# Patient Record
Sex: FEMALE | Race: WHITE | NOT HISPANIC OR LATINO | Employment: OTHER | URBAN - METROPOLITAN AREA
[De-identification: names, ages, dates, MRNs, and addresses within clinical notes are randomized per-mention and may not be internally consistent; named-entity substitution may affect disease eponyms.]

---

## 2019-04-01 ENCOUNTER — OFFICE VISIT (OUTPATIENT)
Dept: URGENT CARE | Facility: CLINIC | Age: 44
End: 2019-04-01
Payer: COMMERCIAL

## 2019-04-01 VITALS
DIASTOLIC BLOOD PRESSURE: 76 MMHG | SYSTOLIC BLOOD PRESSURE: 128 MMHG | OXYGEN SATURATION: 98 % | BODY MASS INDEX: 45 KG/M2 | TEMPERATURE: 99.1 F | HEART RATE: 125 BPM | WEIGHT: 280 LBS | RESPIRATION RATE: 13 BRPM | HEIGHT: 66 IN

## 2019-04-01 DIAGNOSIS — J02.9 PHARYNGITIS, UNSPECIFIED ETIOLOGY: ICD-10-CM

## 2019-04-01 DIAGNOSIS — F17.200 SMOKER: ICD-10-CM

## 2019-04-01 DIAGNOSIS — R05.9 COUGH: ICD-10-CM

## 2019-04-01 DIAGNOSIS — J10.1 INFLUENZA A: ICD-10-CM

## 2019-04-01 LAB
FLUAV+FLUBV AG SPEC QL IA: ABNORMAL
INT CON NEG: NEGATIVE
INT CON NEG: NEGATIVE
INT CON POS: POSITIVE
INT CON POS: POSITIVE
S PYO AG THROAT QL: NORMAL

## 2019-04-01 PROCEDURE — 87804 INFLUENZA ASSAY W/OPTIC: CPT | Performed by: PHYSICIAN ASSISTANT

## 2019-04-01 PROCEDURE — 87880 STREP A ASSAY W/OPTIC: CPT | Performed by: PHYSICIAN ASSISTANT

## 2019-04-01 PROCEDURE — 99406 BEHAV CHNG SMOKING 3-10 MIN: CPT | Performed by: PHYSICIAN ASSISTANT

## 2019-04-01 PROCEDURE — 99214 OFFICE O/P EST MOD 30 MIN: CPT | Mod: 25 | Performed by: PHYSICIAN ASSISTANT

## 2019-04-01 RX ORDER — OSELTAMIVIR PHOSPHATE 75 MG/1
75 CAPSULE ORAL 2 TIMES DAILY
Qty: 10 CAP | Refills: 0 | Status: SHIPPED | OUTPATIENT
Start: 2019-04-01 | End: 2019-04-06

## 2019-04-01 RX ORDER — BENZONATATE 100 MG/1
200 CAPSULE ORAL 3 TIMES DAILY PRN
Qty: 60 CAP | Refills: 0 | Status: SHIPPED | OUTPATIENT
Start: 2019-04-01 | End: 2019-05-21

## 2019-04-01 ASSESSMENT — ENCOUNTER SYMPTOMS
NAUSEA: 0
HEMOPTYSIS: 0
SPUTUM PRODUCTION: 1
VOMITING: 0
COUGH: 1
ABDOMINAL PAIN: 0
FEVER: 1
SHORTNESS OF BREATH: 0
SORE THROAT: 1
WHEEZING: 0
MYALGIAS: 1
CHILLS: 1
HEADACHES: 1
DIARRHEA: 1

## 2019-04-01 NOTE — LETTER
April 1, 2019         Patient: Marni Muñoz   YOB: 1975   Date of Visit: 4/1/2019           To Whom it May Concern:    Marni Muñoz was seen in my clinic on 4/1/2019. She may return to work on 4/4/19..    If you have any questions or concerns, please don't hesitate to call.        Sincerely,           [facility]  Electronically Signed

## 2019-04-02 NOTE — PROGRESS NOTES
"Subjective:   Marni Muñoz is a 43 y.o. female who presents for Cough and Sore Throat    This is a new problem.  Patient presents to urgent care with 2-day history of nasal congestion, sore throat and cough.  She reports fatigue with generalized body aches, chills and fever.  Patient did not receive influenza vaccine this season.  She has been taking Tylenol Cold and flu max with no improvement in symptoms.  Cough is productive at times of yellow colored sputum.        Cough   Associated symptoms include chills, a fever, headaches, myalgias and a sore throat. Pertinent negatives include no chest pain, ear pain, hemoptysis, shortness of breath or wheezing.     Review of Systems   Constitutional: Positive for chills, fever and malaise/fatigue.   HENT: Positive for congestion and sore throat. Negative for ear pain.    Respiratory: Positive for cough and sputum production. Negative for hemoptysis, shortness of breath and wheezing.    Cardiovascular: Negative for chest pain.   Gastrointestinal: Positive for diarrhea. Negative for abdominal pain, nausea and vomiting.   Musculoskeletal: Positive for myalgias.   Neurological: Positive for headaches.   All other systems reviewed and are negative.    Allergies   Allergen Reactions   • Amoxicillin Rash   • Food      bananas        Objective:   /76   Pulse (!) 125   Temp 37.3 °C (99.1 °F) (Temporal)   Resp 13   Ht 1.676 m (5' 6\")   Wt (!) 127 kg (280 lb)   SpO2 98%   BMI 45.19 kg/m²   Physical Exam   Constitutional: She is oriented to person, place, and time. She appears well-developed and well-nourished. She does not appear ill. No distress.   HENT:   Head: Normocephalic and atraumatic.   Right Ear: Tympanic membrane, external ear and ear canal normal.   Left Ear: Tympanic membrane, external ear and ear canal normal.   Nose: Mucosal edema present. No rhinorrhea. Right sinus exhibits no maxillary sinus tenderness and no frontal sinus tenderness. Left sinus " exhibits no maxillary sinus tenderness and no frontal sinus tenderness.   Mouth/Throat: Uvula is midline and mucous membranes are normal. Posterior oropharyngeal erythema present. No oropharyngeal exudate. Tonsils are 1+ on the right. Tonsils are 1+ on the left. No tonsillar exudate.   Eyes: Pupils are equal, round, and reactive to light. Conjunctivae and EOM are normal.   Neck: Normal range of motion. Neck supple.   Cardiovascular: Normal rate, regular rhythm and normal heart sounds.  Exam reveals no gallop and no friction rub.    No murmur heard.  Pulmonary/Chest: Effort normal and breath sounds normal. No respiratory distress. She has no wheezes. She has no rales.   Abdominal: Soft. Bowel sounds are normal. She exhibits no distension and no mass. There is no tenderness. There is no rebound and no guarding.   Musculoskeletal: Normal range of motion. She exhibits no edema, tenderness or deformity.   Lymphadenopathy:        Head (right side): No submental, no submandibular and no tonsillar adenopathy present.        Head (left side): No submental, no submandibular and no tonsillar adenopathy present.     She has no cervical adenopathy.        Right: No supraclavicular adenopathy present.        Left: No supraclavicular adenopathy present.   Neurological: She is alert and oriented to person, place, and time. She has normal strength. No cranial nerve deficit or sensory deficit. Coordination normal.   Skin: Skin is warm and dry. No rash noted.   Psychiatric: She has a normal mood and affect. Judgment normal.   Vitals reviewed.          Assessment/Plan:   Assessment    1. Influenza A  - POCT Rapid Strep A  - POCT Influenza A/B  - oseltamivir (TAMIFLU) 75 MG Cap; Take 1 Cap by mouth 2 times a day for 5 days.  Dispense: 10 Cap; Refill: 0    2. Smoker  Greater than 3 minutes spent counseling on nicotine dependence, associated disease process and affect on present illness. Counseled regarding cessation. Not ready to quit at  this time.     3. Cough  - benzonatate (TESSALON) 100 MG Cap; Take 2 Caps by mouth 3 times a day as needed.  Dispense: 60 Cap; Refill: 0    4. Pharyngitis, unspecified etiology  - maalox plus-benadryl-visc lidocaine (MAGIC MOUTHWASH); 10 ml swish, gargle and spit every 6 hours as needed for sore throat  Dispense: 120 mL; Refill: 0    Testing for influenza is positive for influenza A.  Strep testing is negative.  Symptomatic, supportive care.  Increase fluids, rest.  Patient is given a prescription for Tamiflu as above and encouraged to take this starting this evening and take it with food in order to avoid GI upset.  She is also given Tessalon Perles for cough and Magic mouthwash for sore throat.  Ice pops, cool fluids, salt water gargles.  Contagion reviewed.  Printed information with patient education on influenza given.  Note off work given.      Differential diagnosis, natural history, supportive care, and indications for immediate follow-up discussed.    If not improving in 3-5 days, F/U with PCP or return to  or sooner if worsens  Strict ER precautions given.    Please note that this note was created using voice recognition speech to text software. Every effort has been made to correct obvious errors.  However, I expect there are errors of grammar and possibly context that were not discovered prior to finalizing the note

## 2019-04-02 NOTE — PATIENT INSTRUCTIONS
"Influenza, Adult  Influenza (“the flu\") is an infection in the lungs, nose, and throat (respiratory tract). It is caused by a virus. The flu causes many common cold symptoms, as well as a high fever and body aches. It can make you feel very sick.  The flu spreads easily from person to person (is contagious). Getting a flu shot (influenza vaccination) every year is the best way to prevent the flu.  Follow these instructions at home:  · Take over-the-counter and prescription medicines only as told by your doctor.  · Use a cool mist humidifier to add moisture (humidity) to the air in your home. This can make it easier to breathe.  · Rest as needed.  · Drink enough fluid to keep your pee (urine) clear or pale yellow.  · Cover your mouth and nose when you cough or sneeze.  · Wash your hands with soap and water often, especially after you cough or sneeze. If you cannot use soap and water, use hand .  · Stay home from work or school as told by your doctor. Unless you are visiting your doctor, try to avoid leaving home until your fever has been gone for 24 hours without the use of medicine.  · Keep all follow-up visits as told by your doctor. This is important.  How is this prevented?  · Getting a yearly (annual) flu shot is the best way to avoid getting the flu. You may get the flu shot in late summer, fall, or winter. Ask your doctor when you should get your flu shot.  · Wash your hands often or use hand  often.  · Avoid contact with people who are sick during cold and flu season.  · Eat healthy foods.  · Drink plenty of fluids.  · Get enough sleep.  · Exercise regularly.  Contact a doctor if:  · You get new symptoms.  · You have:  ¨ Chest pain.  ¨ Watery poop (diarrhea).  ¨ A fever.  · Your cough gets worse.  · You start to have more mucus.  · You feel sick to your stomach (nauseous).  · You throw up (vomit).  Get help right away if:  · You start to be short of breath or have trouble breathing.  · Your " skin or nails turn a bluish color.  · You have very bad pain or stiffness in your neck.  · You get a sudden headache.  · You get sudden pain in your face or ear.  · You cannot stop throwing up.  This information is not intended to replace advice given to you by your health care provider. Make sure you discuss any questions you have with your health care provider.  Document Released: 09/26/2009 Document Revised: 05/25/2017 Document Reviewed: 10/11/2016  [employer] Patient Education © 2017 [employer]  C.

## 2019-05-21 ENCOUNTER — OFFICE VISIT (OUTPATIENT)
Dept: URGENT CARE | Facility: CLINIC | Age: 44
End: 2019-05-21
Payer: COMMERCIAL

## 2019-05-21 ENCOUNTER — APPOINTMENT (OUTPATIENT)
Dept: RADIOLOGY | Facility: IMAGING CENTER | Age: 44
End: 2019-05-21
Attending: PHYSICIAN ASSISTANT
Payer: COMMERCIAL

## 2019-05-21 VITALS
HEIGHT: 66 IN | DIASTOLIC BLOOD PRESSURE: 80 MMHG | BODY MASS INDEX: 37.61 KG/M2 | SYSTOLIC BLOOD PRESSURE: 132 MMHG | TEMPERATURE: 98.6 F | HEART RATE: 97 BPM | WEIGHT: 234 LBS | OXYGEN SATURATION: 98 % | RESPIRATION RATE: 14 BRPM

## 2019-05-21 DIAGNOSIS — S99.922A INJURY OF LEFT FOOT, INITIAL ENCOUNTER: Primary | ICD-10-CM

## 2019-05-21 DIAGNOSIS — S99.922A INJURY OF LEFT FOOT, INITIAL ENCOUNTER: ICD-10-CM

## 2019-05-21 PROCEDURE — 73630 X-RAY EXAM OF FOOT: CPT | Mod: TC,LT | Performed by: PHYSICIAN ASSISTANT

## 2019-05-21 PROCEDURE — 99214 OFFICE O/P EST MOD 30 MIN: CPT | Performed by: PHYSICIAN ASSISTANT

## 2019-05-21 ASSESSMENT — ENCOUNTER SYMPTOMS
FEVER: 0
FATIGUE: 0
FALLS: 0
JOINT SWELLING: 1
VOMITING: 0
CHANGE IN BOWEL HABIT: 0
CHILLS: 0
TINGLING: 0
WEAKNESS: 0
ABDOMINAL PAIN: 0
DIARRHEA: 0
COUGH: 0
NAUSEA: 0
CONSTIPATION: 0
SHORTNESS OF BREATH: 0
NUMBNESS: 0

## 2019-05-22 NOTE — PROGRESS NOTES
"Subjective:   Marni Muñoz is a 43 y.o. female who presents for Foot Pain (Left foot )        Foot Problem   This is a new problem. The current episode started yesterday. The problem occurs constantly. The problem has been unchanged. Associated symptoms include joint swelling. Pertinent negatives include no abdominal pain, change in bowel habit, chills, congestion, coughing, fatigue, fever, nausea, numbness, rash, urinary symptoms, vomiting or weakness.     Review of Systems   Constitutional: Negative for chills, fatigue, fever and malaise/fatigue.   HENT: Negative for congestion.    Respiratory: Negative for cough and shortness of breath.    Gastrointestinal: Negative for abdominal pain, change in bowel habit, constipation, diarrhea, nausea and vomiting.   Musculoskeletal: Positive for joint pain and joint swelling. Negative for falls.   Skin: Negative for rash.   Neurological: Negative for tingling, weakness and numbness.   All other systems reviewed and are negative.      PMH:  has no past medical history on file.  MEDS:   Current Outpatient Prescriptions:   •  ibuprofen (MOTRIN) 600 MG TABS, Take 600 mg by mouth every 6 hours as needed., Disp: , Rfl:   •  ibuprofen (MOTRIN) 200 MG TABS, Take 200 mg by mouth every 6 hours as needed.  , Disp: , Rfl:   •  ibuprofen (MOTRIN) 800 MG TABS, Take 800 mg by mouth every 6 hours as needed., Disp: , Rfl:   ALLERGIES:   Allergies   Allergen Reactions   • Amoxicillin Rash   • Food      bananas     SURGHX: History reviewed. No pertinent surgical history.  SOCHX:  reports that she has been smoking Cigarettes.  She has smoked for the past 22.00 years. She has never used smokeless tobacco. She reports that she drinks alcohol. She reports that she does not use drugs.  History reviewed. No pertinent family history.     Objective:   /80   Pulse 97   Temp 37 °C (98.6 °F) (Temporal)   Resp 14   Ht 1.676 m (5' 6\")   Wt 106.1 kg (234 lb)   SpO2 98%   BMI 37.77 kg/m²     "     Physical Exam   Constitutional: She is oriented to person, place, and time. She appears well-developed and well-nourished. No distress.   HENT:   Head: Normocephalic and atraumatic.   Nose: Nose normal.   Eyes: Pupils are equal, round, and reactive to light. Conjunctivae are normal.   Neck: Normal range of motion. Neck supple. No tracheal deviation present.   Cardiovascular: Normal rate and regular rhythm.    Pulmonary/Chest: Effort normal and breath sounds normal.   Musculoskeletal:        Left foot: There is decreased range of motion, tenderness and bony tenderness.        Feet:    Neurological: She is alert and oriented to person, place, and time.   Skin: Skin is warm and dry. Capillary refill takes less than 2 seconds.   Psychiatric: She has a normal mood and affect. Her behavior is normal.   Vitals reviewed.    XR:   FINDINGS:  No acute displaced fracture is noted.    There is no subluxation.    There is no unusual degeneration.    No focal soft tissue swelling   Impression       No radiographic evidence of acute displaced fracture or subluxation.             Assessment/Plan:     1. Injury of left foot, initial encounter  DX-FOOT-COMPLETE 3+ LEFT     Per my read, no fracture seen on x-ray.  Agree with radiology report.Pt directed RICE and Ibuprofen 600-800mg as needed for pain. Recommended hard soled shoes. Educational handout on RICE injuries provided.      Follow-up with primary care provider.  If symptoms worsen or persist patient can return to clinic for reevaluation.  Patient verbalized understanding of information.    Please note that this dictation was created using voice recognition software. I have made every reasonable attempt to correct obvious errors, but I expect that there are errors of grammar and possibly content that I did not discover before finalizing the note.

## 2020-06-30 ENCOUNTER — OFFICE VISIT (OUTPATIENT)
Dept: URGENT CARE | Facility: CLINIC | Age: 45
End: 2020-06-30
Payer: COMMERCIAL

## 2020-06-30 ENCOUNTER — HOSPITAL ENCOUNTER (OUTPATIENT)
Facility: MEDICAL CENTER | Age: 45
End: 2020-06-30
Attending: PHYSICIAN ASSISTANT
Payer: COMMERCIAL

## 2020-06-30 VITALS
OXYGEN SATURATION: 96 % | SYSTOLIC BLOOD PRESSURE: 100 MMHG | HEIGHT: 66 IN | WEIGHT: 229 LBS | BODY MASS INDEX: 36.8 KG/M2 | RESPIRATION RATE: 16 BRPM | DIASTOLIC BLOOD PRESSURE: 60 MMHG | TEMPERATURE: 97.7 F | HEART RATE: 129 BPM

## 2020-06-30 DIAGNOSIS — R05.9 COUGH: ICD-10-CM

## 2020-06-30 DIAGNOSIS — R53.83 FATIGUE, UNSPECIFIED TYPE: ICD-10-CM

## 2020-06-30 DIAGNOSIS — J02.9 PHARYNGITIS, UNSPECIFIED ETIOLOGY: ICD-10-CM

## 2020-06-30 LAB
FLUAV+FLUBV AG SPEC QL IA: NEGATIVE
INT CON NEG: NEGATIVE
INT CON NEG: NEGATIVE
INT CON POS: POSITIVE
INT CON POS: POSITIVE
S PYO AG THROAT QL: NEGATIVE

## 2020-06-30 PROCEDURE — 87804 INFLUENZA ASSAY W/OPTIC: CPT | Performed by: PHYSICIAN ASSISTANT

## 2020-06-30 PROCEDURE — 87880 STREP A ASSAY W/OPTIC: CPT | Performed by: PHYSICIAN ASSISTANT

## 2020-06-30 PROCEDURE — U0003 INFECTIOUS AGENT DETECTION BY NUCLEIC ACID (DNA OR RNA); SEVERE ACUTE RESPIRATORY SYNDROME CORONAVIRUS 2 (SARS-COV-2) (CORONAVIRUS DISEASE [COVID-19]), AMPLIFIED PROBE TECHNIQUE, MAKING USE OF HIGH THROUGHPUT TECHNOLOGIES AS DESCRIBED BY CMS-2020-01-R: HCPCS

## 2020-06-30 PROCEDURE — 99214 OFFICE O/P EST MOD 30 MIN: CPT | Performed by: PHYSICIAN ASSISTANT

## 2020-06-30 RX ORDER — AZITHROMYCIN 250 MG/1
TABLET, FILM COATED ORAL
Qty: 6 TAB | Refills: 0 | Status: ON HOLD | OUTPATIENT
Start: 2020-06-30 | End: 2021-02-04

## 2020-06-30 ASSESSMENT — ENCOUNTER SYMPTOMS
SINUS PAIN: 0
FATIGUE: 1
COUGH: 1
WHEEZING: 0
NAUSEA: 0
DIARRHEA: 0
SHORTNESS OF BREATH: 0
FEVER: 0
ABDOMINAL PAIN: 0
VOMITING: 0
SORE THROAT: 1
MYALGIAS: 1
CHILLS: 1
SPUTUM PRODUCTION: 0

## 2020-07-01 DIAGNOSIS — R53.83 FATIGUE, UNSPECIFIED TYPE: ICD-10-CM

## 2020-07-01 DIAGNOSIS — R05.9 COUGH: ICD-10-CM

## 2020-07-01 LAB
COVID ORDER STATUS COVID19: NORMAL
SARS-COV-2 RNA RESP QL NAA+PROBE: NOTDETECTED
SPECIMEN SOURCE: NORMAL

## 2020-07-01 NOTE — PROGRESS NOTES
"Subjective:   Marni Muñoz  is a 45 y.o. female who presents for Fatigue (migraine, hot/cold, cough, sore throat, body aches, congestion)      Fatigue   Associated symptoms include chills, congestion ( sinus), coughing, fatigue, myalgias and a sore throat. Pertinent negatives include no abdominal pain, fever, nausea, rash or vomiting.   Patient comes clinic complaining of last 2 days of cough that has been dry.  She notes subjective body aches and chills.  She complains of mild sore throat but denies much ear pain.  She notes she is been treating her symptoms with over-the-counter decongestants and multisymptom medicines.  She denies nausea vomiting abdominal pain diarrhea or rash.  Denies history of asthma but notes past medical history of recurrence of bronchitis over the years.  She is concerned for lower respiratory tract infection but admits this far is more in her sinuses.  She denies known exposure to individuals with COVID but is concerned with the possibility and requests testing today.    Review of Systems   Constitutional: Positive for chills and fatigue. Negative for fever.   HENT: Positive for congestion ( sinus), ear pain and sore throat. Negative for sinus pain.    Respiratory: Positive for cough. Negative for sputum production, shortness of breath and wheezing.    Gastrointestinal: Negative for abdominal pain, diarrhea, nausea and vomiting.   Musculoskeletal: Positive for myalgias.   Skin: Negative for rash.       Allergies   Allergen Reactions   • Amoxicillin Rash   • Food      bananas        Objective:   /60 (BP Location: Right arm, Patient Position: Sitting)   Pulse (!) 129   Temp 36.5 °C (97.7 °F) (Temporal)   Resp 16   Ht 1.676 m (5' 6\")   Wt 103.9 kg (229 lb)   SpO2 96%   BMI 36.96 kg/m²     Physical Exam  Vitals signs and nursing note reviewed.   Constitutional:       General: She is not in acute distress.     Appearance: She is well-developed. She is not diaphoretic.   HENT:     "  Head: Normocephalic and atraumatic.      Right Ear: Tympanic membrane, ear canal and external ear normal.      Left Ear: Tympanic membrane, ear canal and external ear normal.      Nose: Nose normal.      Mouth/Throat:      Pharynx: Uvula midline. Posterior oropharyngeal erythema ( mild PND) present. No oropharyngeal exudate.      Tonsils: No tonsillar abscesses.   Eyes:      General: Lids are normal. No scleral icterus.        Right eye: No discharge.         Left eye: No discharge.      Conjunctiva/sclera: Conjunctivae normal.   Neck:      Musculoskeletal: Neck supple.   Pulmonary:      Effort: Pulmonary effort is normal. No respiratory distress.      Breath sounds: No decreased breath sounds, wheezing, rhonchi ( clears w/ cough) or rales.   Musculoskeletal: Normal range of motion.   Lymphadenopathy:      Cervical: Cervical adenopathy ( mild bilat) present.   Skin:     General: Skin is warm and dry.      Coloration: Skin is not pale.      Findings: No erythema.   Neurological:      Mental Status: She is alert and oriented to person, place, and time. She is not disoriented.   Psychiatric:         Speech: Speech normal.         Behavior: Behavior normal.       Point-of-care testing for flu was negative  Point-of-care testing for strep is negative  COVID testing   Assessment/Plan:   1. Cough  - COVID/SARS COV-2 PCR; Future  - azithromycin (ZITHROMAX) 250 MG Tab; Take as directed on package. Dispense one package.  Dispense: 6 Tab; Refill: 0    2. Pharyngitis, unspecified etiology    3. Fatigue, unspecified type  - POCT Influenza A/B  - POCT Rapid Strep A  - COVID/SARS COV-2 PCR; Future  Supportive care is reviewed with patient/caregiver - recommend to push PO fluids and electrolytes, Nsaids/tylenol, netti pot/saline irrig, humidifier in home, flonase, ponaris, antihistamine, Contingent antibiotic prescription given to patient to fill upon meeting criteria of guidelines discussed.   If filling,  take full course of Rx,  take with probiotics, observe for resolution  Return to clinic with lack of resolution or progression of symptoms.    I will call w/ results of COVID testing; quarantine instructions until then  I have worn an N95 mask, gloves and eye protection for the entire encounter with this patient.     Differential diagnosis, natural history, supportive care, and indications for immediate follow-up discussed.

## 2021-01-21 ENCOUNTER — APPOINTMENT (OUTPATIENT)
Dept: RADIOLOGY | Facility: MEDICAL CENTER | Age: 46
DRG: 025 | End: 2021-01-21
Attending: EMERGENCY MEDICINE
Payer: COMMERCIAL

## 2021-01-21 ENCOUNTER — HOSPITAL ENCOUNTER (INPATIENT)
Facility: MEDICAL CENTER | Age: 46
LOS: 13 days | DRG: 025 | End: 2021-02-04
Attending: EMERGENCY MEDICINE | Admitting: STUDENT IN AN ORGANIZED HEALTH CARE EDUCATION/TRAINING PROGRAM
Payer: COMMERCIAL

## 2021-01-21 ENCOUNTER — OFFICE VISIT (OUTPATIENT)
Dept: URGENT CARE | Facility: CLINIC | Age: 46
End: 2021-01-21
Payer: COMMERCIAL

## 2021-01-21 VITALS
BODY MASS INDEX: 34.39 KG/M2 | HEART RATE: 118 BPM | DIASTOLIC BLOOD PRESSURE: 82 MMHG | OXYGEN SATURATION: 95 % | SYSTOLIC BLOOD PRESSURE: 124 MMHG | TEMPERATURE: 97.3 F | HEIGHT: 66 IN | WEIGHT: 214 LBS | RESPIRATION RATE: 16 BRPM

## 2021-01-21 DIAGNOSIS — R51.9 WORST HEADACHE OF LIFE: ICD-10-CM

## 2021-01-21 DIAGNOSIS — H53.8 BLURRED VISION: ICD-10-CM

## 2021-01-21 DIAGNOSIS — D32.9 MENINGIOMA (HCC): ICD-10-CM

## 2021-01-21 LAB
ALBUMIN SERPL BCP-MCNC: 4.4 G/DL (ref 3.2–4.9)
ALBUMIN/GLOB SERPL: 1.4 G/DL
ALP SERPL-CCNC: 68 U/L (ref 30–99)
ALT SERPL-CCNC: 13 U/L (ref 2–50)
ANION GAP SERPL CALC-SCNC: 13 MMOL/L (ref 7–16)
APTT PPP: 35.9 SEC (ref 24.7–36)
AST SERPL-CCNC: 19 U/L (ref 12–45)
BASOPHILS # BLD AUTO: 0.8 % (ref 0–1.8)
BASOPHILS # BLD: 0.09 K/UL (ref 0–0.12)
BILIRUB SERPL-MCNC: 0.2 MG/DL (ref 0.1–1.5)
BUN SERPL-MCNC: 11 MG/DL (ref 8–22)
CALCIUM SERPL-MCNC: 9.5 MG/DL (ref 8.5–10.5)
CHLORIDE SERPL-SCNC: 103 MMOL/L (ref 96–112)
CO2 SERPL-SCNC: 21 MMOL/L (ref 20–33)
CREAT SERPL-MCNC: 0.64 MG/DL (ref 0.5–1.4)
EOSINOPHIL # BLD AUTO: 0.2 K/UL (ref 0–0.51)
EOSINOPHIL NFR BLD: 1.9 % (ref 0–6.9)
ERYTHROCYTE [DISTWIDTH] IN BLOOD BY AUTOMATED COUNT: 42.7 FL (ref 35.9–50)
GLOBULIN SER CALC-MCNC: 3.1 G/DL (ref 1.9–3.5)
GLUCOSE SERPL-MCNC: 239 MG/DL (ref 65–99)
HCG SERPL QL: NEGATIVE
HCT VFR BLD AUTO: 47.8 % (ref 37–47)
HGB BLD-MCNC: 16 G/DL (ref 12–16)
IMM GRANULOCYTES # BLD AUTO: 0.05 K/UL (ref 0–0.11)
IMM GRANULOCYTES NFR BLD AUTO: 0.5 % (ref 0–0.9)
INR PPP: 0.97 (ref 0.87–1.13)
LYMPHOCYTES # BLD AUTO: 4.12 K/UL (ref 1–4.8)
LYMPHOCYTES NFR BLD: 38.9 % (ref 22–41)
MCH RBC QN AUTO: 31.3 PG (ref 27–33)
MCHC RBC AUTO-ENTMCNC: 33.5 G/DL (ref 33.6–35)
MCV RBC AUTO: 93.5 FL (ref 81.4–97.8)
MONOCYTES # BLD AUTO: 0.56 K/UL (ref 0–0.85)
MONOCYTES NFR BLD AUTO: 5.3 % (ref 0–13.4)
NEUTROPHILS # BLD AUTO: 5.58 K/UL (ref 2–7.15)
NEUTROPHILS NFR BLD: 52.6 % (ref 44–72)
NRBC # BLD AUTO: 0 K/UL
NRBC BLD-RTO: 0 /100 WBC
PLATELET # BLD AUTO: 406 K/UL (ref 164–446)
PMV BLD AUTO: 10.9 FL (ref 9–12.9)
POTASSIUM SERPL-SCNC: 4.5 MMOL/L (ref 3.6–5.5)
PROT SERPL-MCNC: 7.5 G/DL (ref 6–8.2)
PROTHROMBIN TIME: 13.2 SEC (ref 12–14.6)
RBC # BLD AUTO: 5.11 M/UL (ref 4.2–5.4)
SODIUM SERPL-SCNC: 137 MMOL/L (ref 135–145)
WBC # BLD AUTO: 10.6 K/UL (ref 4.8–10.8)

## 2021-01-21 PROCEDURE — U0003 INFECTIOUS AGENT DETECTION BY NUCLEIC ACID (DNA OR RNA); SEVERE ACUTE RESPIRATORY SYNDROME CORONAVIRUS 2 (SARS-COV-2) (CORONAVIRUS DISEASE [COVID-19]), AMPLIFIED PROBE TECHNIQUE, MAKING USE OF HIGH THROUGHPUT TECHNOLOGIES AS DESCRIBED BY CMS-2020-01-R: HCPCS

## 2021-01-21 PROCEDURE — 85025 COMPLETE CBC W/AUTO DIFF WBC: CPT

## 2021-01-21 PROCEDURE — 99285 EMERGENCY DEPT VISIT HI MDM: CPT

## 2021-01-21 PROCEDURE — 84703 CHORIONIC GONADOTROPIN ASSAY: CPT

## 2021-01-21 PROCEDURE — 70450 CT HEAD/BRAIN W/O DYE: CPT

## 2021-01-21 PROCEDURE — 85730 THROMBOPLASTIN TIME PARTIAL: CPT

## 2021-01-21 PROCEDURE — 96375 TX/PRO/DX INJ NEW DRUG ADDON: CPT

## 2021-01-21 PROCEDURE — 71045 X-RAY EXAM CHEST 1 VIEW: CPT

## 2021-01-21 PROCEDURE — U0005 INFEC AGEN DETEC AMPLI PROBE: HCPCS

## 2021-01-21 PROCEDURE — 85610 PROTHROMBIN TIME: CPT

## 2021-01-21 PROCEDURE — 99214 OFFICE O/P EST MOD 30 MIN: CPT | Performed by: PHYSICIAN ASSISTANT

## 2021-01-21 PROCEDURE — G0378 HOSPITAL OBSERVATION PER HR: HCPCS

## 2021-01-21 PROCEDURE — 80053 COMPREHEN METABOLIC PANEL: CPT

## 2021-01-21 PROCEDURE — 96374 THER/PROPH/DIAG INJ IV PUSH: CPT

## 2021-01-21 PROCEDURE — 99220 PR INITIAL OBSERVATION CARE,LEVL III: CPT | Performed by: STUDENT IN AN ORGANIZED HEALTH CARE EDUCATION/TRAINING PROGRAM

## 2021-01-21 PROCEDURE — 700111 HCHG RX REV CODE 636 W/ 250 OVERRIDE (IP): Performed by: EMERGENCY MEDICINE

## 2021-01-21 RX ORDER — DIPHENHYDRAMINE HYDROCHLORIDE 50 MG/ML
25 INJECTION INTRAMUSCULAR; INTRAVENOUS ONCE
Status: COMPLETED | OUTPATIENT
Start: 2021-01-21 | End: 2021-01-21

## 2021-01-21 RX ORDER — DEXAMETHASONE 4 MG/1
2 TABLET ORAL EVERY 8 HOURS
Status: DISCONTINUED | OUTPATIENT
Start: 2021-01-21 | End: 2021-01-22

## 2021-01-21 RX ORDER — BISACODYL 10 MG
10 SUPPOSITORY, RECTAL RECTAL
Status: DISCONTINUED | OUTPATIENT
Start: 2021-01-21 | End: 2021-01-30

## 2021-01-21 RX ORDER — ACETAMINOPHEN 325 MG/1
650 TABLET ORAL EVERY 6 HOURS PRN
Status: DISCONTINUED | OUTPATIENT
Start: 2021-01-21 | End: 2021-01-29

## 2021-01-21 RX ORDER — ONDANSETRON 2 MG/ML
4 INJECTION INTRAMUSCULAR; INTRAVENOUS EVERY 4 HOURS PRN
Status: DISCONTINUED | OUTPATIENT
Start: 2021-01-21 | End: 2021-01-29

## 2021-01-21 RX ORDER — AMOXICILLIN 250 MG
2 CAPSULE ORAL 2 TIMES DAILY
Status: DISCONTINUED | OUTPATIENT
Start: 2021-01-22 | End: 2021-01-30

## 2021-01-21 RX ORDER — PROMETHAZINE HYDROCHLORIDE 12.5 MG/1
12.5-25 SUPPOSITORY RECTAL EVERY 4 HOURS PRN
Status: DISCONTINUED | OUTPATIENT
Start: 2021-01-21 | End: 2021-02-04 | Stop reason: HOSPADM

## 2021-01-21 RX ORDER — LEVETIRACETAM 500 MG/1
500 TABLET ORAL 2 TIMES DAILY
Status: DISCONTINUED | OUTPATIENT
Start: 2021-01-22 | End: 2021-01-29

## 2021-01-21 RX ORDER — KETOROLAC TROMETHAMINE 30 MG/ML
30 INJECTION, SOLUTION INTRAMUSCULAR; INTRAVENOUS ONCE
Status: COMPLETED | OUTPATIENT
Start: 2021-01-21 | End: 2021-01-21

## 2021-01-21 RX ORDER — METOCLOPRAMIDE HYDROCHLORIDE 5 MG/ML
10 INJECTION INTRAMUSCULAR; INTRAVENOUS ONCE
Status: COMPLETED | OUTPATIENT
Start: 2021-01-21 | End: 2021-01-21

## 2021-01-21 RX ORDER — MULTIVIT WITH MINERALS/LUTEIN
1 TABLET ORAL DAILY
COMMUNITY

## 2021-01-21 RX ORDER — POLYETHYLENE GLYCOL 3350 17 G/17G
1 POWDER, FOR SOLUTION ORAL
Status: DISCONTINUED | OUTPATIENT
Start: 2021-01-21 | End: 2021-01-30

## 2021-01-21 RX ORDER — ONDANSETRON 4 MG/1
4 TABLET, ORALLY DISINTEGRATING ORAL EVERY 4 HOURS PRN
Status: DISCONTINUED | OUTPATIENT
Start: 2021-01-21 | End: 2021-01-31

## 2021-01-21 RX ORDER — PROMETHAZINE HYDROCHLORIDE 25 MG/1
12.5-25 TABLET ORAL EVERY 4 HOURS PRN
Status: DISCONTINUED | OUTPATIENT
Start: 2021-01-21 | End: 2021-01-30

## 2021-01-21 RX ORDER — ACETAMINOPHEN 500 MG
1000 TABLET ORAL 2 TIMES DAILY PRN
COMMUNITY

## 2021-01-21 RX ORDER — PROCHLORPERAZINE EDISYLATE 5 MG/ML
5-10 INJECTION INTRAMUSCULAR; INTRAVENOUS EVERY 4 HOURS PRN
Status: DISCONTINUED | OUTPATIENT
Start: 2021-01-21 | End: 2021-02-04 | Stop reason: HOSPADM

## 2021-01-21 RX ORDER — LEVETIRACETAM 500 MG/1
500 TABLET ORAL 2 TIMES DAILY
Status: DISCONTINUED | OUTPATIENT
Start: 2021-01-21 | End: 2021-01-21

## 2021-01-21 RX ORDER — DEXAMETHASONE SODIUM PHOSPHATE 4 MG/ML
4 INJECTION, SOLUTION INTRA-ARTICULAR; INTRALESIONAL; INTRAMUSCULAR; INTRAVENOUS; SOFT TISSUE ONCE
Status: COMPLETED | OUTPATIENT
Start: 2021-01-21 | End: 2021-01-21

## 2021-01-21 RX ADMIN — METOCLOPRAMIDE 10 MG: 5 INJECTION, SOLUTION INTRAMUSCULAR; INTRAVENOUS at 21:47

## 2021-01-21 RX ADMIN — DEXAMETHASONE SODIUM PHOSPHATE 4 MG: 4 INJECTION, SOLUTION INTRA-ARTICULAR; INTRALESIONAL; INTRAMUSCULAR; INTRAVENOUS; SOFT TISSUE at 21:45

## 2021-01-21 RX ADMIN — DIPHENHYDRAMINE HYDROCHLORIDE 25 MG: 50 INJECTION INTRAMUSCULAR; INTRAVENOUS at 21:47

## 2021-01-21 RX ADMIN — KETOROLAC TROMETHAMINE 30 MG: 30 INJECTION, SOLUTION INTRAMUSCULAR at 22:30

## 2021-01-21 ASSESSMENT — ENCOUNTER SYMPTOMS
BLURRED VISION: 1
HEADACHES: 1
SHORTNESS OF BREATH: 0
FOCAL WEAKNESS: 0
NAUSEA: 0
PHOTOPHOBIA: 1
COUGH: 0
CONSTIPATION: 0
WEIGHT LOSS: 0
SHORTNESS OF BREATH: 0
ABDOMINAL PAIN: 0
PALPITATIONS: 0
ABDOMINAL PAIN: 0
NAUSEA: 0
DIZZINESS: 1
COUGH: 0
WHEEZING: 0
LOSS OF CONSCIOUSNESS: 0
HEMOPTYSIS: 0
VOMITING: 0
CHILLS: 0
FEVER: 0
FEVER: 0
VOMITING: 0
DIZZINESS: 0
BLURRED VISION: 1
MYALGIAS: 0
PHOTOPHOBIA: 1
TINGLING: 0
EYE PAIN: 0
DOUBLE VISION: 1
SORE THROAT: 0
WEAKNESS: 0
WHEEZING: 0
HEADACHES: 1
PALPITATIONS: 0
CHILLS: 0
ORTHOPNEA: 0
LOSS OF CONSCIOUSNESS: 0
WEAKNESS: 0
DIARRHEA: 0
BLOOD IN STOOL: 0
SENSORY CHANGE: 0

## 2021-01-22 ENCOUNTER — APPOINTMENT (OUTPATIENT)
Dept: RADIOLOGY | Facility: MEDICAL CENTER | Age: 46
DRG: 025 | End: 2021-01-22
Attending: NEUROLOGICAL SURGERY
Payer: COMMERCIAL

## 2021-01-22 LAB
APPEARANCE UR: CLEAR
BACTERIA #/AREA URNS HPF: ABNORMAL /HPF
BILIRUB UR QL STRIP.AUTO: NEGATIVE
COLOR UR: YELLOW
EKG IMPRESSION: NORMAL
EPI CELLS #/AREA URNS HPF: ABNORMAL /HPF
GLUCOSE UR STRIP.AUTO-MCNC: 500 MG/DL
HYALINE CASTS #/AREA URNS LPF: ABNORMAL /LPF
KETONES UR STRIP.AUTO-MCNC: ABNORMAL MG/DL
LEUKOCYTE ESTERASE UR QL STRIP.AUTO: NEGATIVE
MICRO URNS: ABNORMAL
NITRITE UR QL STRIP.AUTO: NEGATIVE
PH UR STRIP.AUTO: 5 [PH] (ref 5–8)
PROT UR QL STRIP: 30 MG/DL
RBC # URNS HPF: ABNORMAL /HPF
RBC UR QL AUTO: NEGATIVE
SARS-COV-2 RNA RESP QL NAA+PROBE: NOTDETECTED
SP GR UR STRIP.AUTO: 1.04
SPECIMEN SOURCE: NORMAL
UROBILINOGEN UR STRIP.AUTO-MCNC: 0.2 MG/DL
WBC #/AREA URNS HPF: ABNORMAL /HPF

## 2021-01-22 PROCEDURE — 81001 URINALYSIS AUTO W/SCOPE: CPT

## 2021-01-22 PROCEDURE — 700111 HCHG RX REV CODE 636 W/ 250 OVERRIDE (IP): Performed by: STUDENT IN AN ORGANIZED HEALTH CARE EDUCATION/TRAINING PROGRAM

## 2021-01-22 PROCEDURE — 770006 HCHG ROOM/CARE - MED/SURG/GYN SEMI*

## 2021-01-22 PROCEDURE — 99233 SBSQ HOSP IP/OBS HIGH 50: CPT | Performed by: INTERNAL MEDICINE

## 2021-01-22 PROCEDURE — 700117 HCHG RX CONTRAST REV CODE 255: Performed by: NEUROLOGICAL SURGERY

## 2021-01-22 PROCEDURE — 700102 HCHG RX REV CODE 250 W/ 637 OVERRIDE(OP): Performed by: STUDENT IN AN ORGANIZED HEALTH CARE EDUCATION/TRAINING PROGRAM

## 2021-01-22 PROCEDURE — G0378 HOSPITAL OBSERVATION PER HR: HCPCS

## 2021-01-22 PROCEDURE — 93005 ELECTROCARDIOGRAM TRACING: CPT | Performed by: NURSE PRACTITIONER

## 2021-01-22 PROCEDURE — 70553 MRI BRAIN STEM W/O & W/DYE: CPT

## 2021-01-22 PROCEDURE — A9270 NON-COVERED ITEM OR SERVICE: HCPCS | Performed by: STUDENT IN AN ORGANIZED HEALTH CARE EDUCATION/TRAINING PROGRAM

## 2021-01-22 PROCEDURE — A9576 INJ PROHANCE MULTIPACK: HCPCS | Performed by: NEUROLOGICAL SURGERY

## 2021-01-22 PROCEDURE — 93010 ELECTROCARDIOGRAM REPORT: CPT | Performed by: INTERNAL MEDICINE

## 2021-01-22 RX ORDER — DEXAMETHASONE 4 MG/1
10 TABLET ORAL EVERY 6 HOURS
Status: DISCONTINUED | OUTPATIENT
Start: 2021-01-22 | End: 2021-01-29

## 2021-01-22 RX ORDER — LEVETIRACETAM 500 MG/1
500 TABLET ORAL ONCE
Status: COMPLETED | OUTPATIENT
Start: 2021-01-22 | End: 2021-01-22

## 2021-01-22 RX ORDER — DIPHENHYDRAMINE HCL 25 MG
25 TABLET ORAL NIGHTLY PRN
Status: DISCONTINUED | OUTPATIENT
Start: 2021-01-22 | End: 2021-01-29

## 2021-01-22 RX ADMIN — DEXAMETHASONE 10 MG: 4 TABLET ORAL at 23:01

## 2021-01-22 RX ADMIN — DEXAMETHASONE 10 MG: 4 TABLET ORAL at 12:51

## 2021-01-22 RX ADMIN — LEVETIRACETAM 500 MG: 500 TABLET ORAL at 17:34

## 2021-01-22 RX ADMIN — LEVETIRACETAM 500 MG: 500 TABLET ORAL at 05:17

## 2021-01-22 RX ADMIN — GADOTERIDOL 20 ML: 279.3 INJECTION, SOLUTION INTRAVENOUS at 01:30

## 2021-01-22 RX ADMIN — LEVETIRACETAM 500 MG: 500 TABLET ORAL at 00:08

## 2021-01-22 RX ADMIN — DIPHENHYDRAMINE HYDROCHLORIDE 25 MG: 25 TABLET ORAL at 23:01

## 2021-01-22 RX ADMIN — ENOXAPARIN SODIUM 40 MG: 40 INJECTION SUBCUTANEOUS at 05:17

## 2021-01-22 RX ADMIN — DEXAMETHASONE 10 MG: 4 TABLET ORAL at 05:16

## 2021-01-22 RX ADMIN — DEXAMETHASONE 10 MG: 4 TABLET ORAL at 17:34

## 2021-01-22 ASSESSMENT — LIFESTYLE VARIABLES
EVER FELT BAD OR GUILTY ABOUT YOUR DRINKING: NO
ON A TYPICAL DAY WHEN YOU DRINK ALCOHOL HOW MANY DRINKS DO YOU HAVE: 0
HAVE YOU EVER FELT YOU SHOULD CUT DOWN ON YOUR DRINKING: NO
HAVE PEOPLE ANNOYED YOU BY CRITICIZING YOUR DRINKING: NO
ALCOHOL_USE: NO
AVERAGE NUMBER OF DAYS PER WEEK YOU HAVE A DRINK CONTAINING ALCOHOL: 0
HOW MANY TIMES IN THE PAST YEAR HAVE YOU HAD 5 OR MORE DRINKS IN A DAY: 0
EVER HAD A DRINK FIRST THING IN THE MORNING TO STEADY YOUR NERVES TO GET RID OF A HANGOVER: NO
TOTAL SCORE: 0
DOES PATIENT WANT TO STOP DRINKING: NO
TOTAL SCORE: 0
CONSUMPTION TOTAL: NEGATIVE
TOTAL SCORE: 0

## 2021-01-22 ASSESSMENT — PAIN DESCRIPTION - PAIN TYPE
TYPE: ACUTE PAIN

## 2021-01-22 ASSESSMENT — ENCOUNTER SYMPTOMS
NERVOUS/ANXIOUS: 0
HEADACHES: 1
COUGH: 0
FEVER: 0
PALPITATIONS: 0
CONSTIPATION: 0
WEAKNESS: 0
DEPRESSION: 0
CHILLS: 0
ABDOMINAL PAIN: 0
DIARRHEA: 0
NAUSEA: 0
SHORTNESS OF BREATH: 0
DIZZINESS: 0
FALLS: 0
VOMITING: 0
BLURRED VISION: 0
SORE THROAT: 0

## 2021-01-22 ASSESSMENT — PATIENT HEALTH QUESTIONNAIRE - PHQ9
2. FEELING DOWN, DEPRESSED, IRRITABLE, OR HOPELESS: NOT AT ALL
SUM OF ALL RESPONSES TO PHQ9 QUESTIONS 1 AND 2: 0
5. POOR APPETITE OR OVEREATING: NOT AT ALL
3. TROUBLE FALLING OR STAYING ASLEEP OR SLEEPING TOO MUCH: NOT AT ALL
6. FEELING BAD ABOUT YOURSELF - OR THAT YOU ARE A FAILURE OR HAVE LET YOURSELF OR YOUR FAMILY DOWN: NOT AL ALL
4. FEELING TIRED OR HAVING LITTLE ENERGY: NOT AT ALL
9. THOUGHTS THAT YOU WOULD BE BETTER OFF DEAD, OR OF HURTING YOURSELF: NOT AT ALL
SUM OF ALL RESPONSES TO PHQ QUESTIONS 1-9: 0
7. TROUBLE CONCENTRATING ON THINGS, SUCH AS READING THE NEWSPAPER OR WATCHING TELEVISION: NOT AT ALL
1. LITTLE INTEREST OR PLEASURE IN DOING THINGS: NOT AT ALL
8. MOVING OR SPEAKING SO SLOWLY THAT OTHER PEOPLE COULD HAVE NOTICED. OR THE OPPOSITE, BEING SO FIGETY OR RESTLESS THAT YOU HAVE BEEN MOVING AROUND A LOT MORE THAN USUAL: NOT AT ALL

## 2021-01-22 NOTE — ED PROVIDER NOTES
ED Provider Note    Scribed for Dr. Chang Haq M.D. by Magda Owens. 1/21/2021  9:23 PM    Primary care provider: Jeane Pham M.D.  Means of arrival: Walk in   History obtained from: Patient   History limited by: None     CHIEF COMPLAINT  Chief Complaint   Patient presents with   • Headache     x 3 days and worsening; pt reports the area of pain is all the way around the front of her head; pt reports hx of migraines and that this one feels much worse and it not alleviated with her typical antinflamatory meds; pt reports vision changes including things appearing different colors between eyes, diplopia, and blurred vision   • Sent from Urgent Care     pt has familial of aneurysms and brain bleeds   • Nausea   • Diarrhea       HPI  Marni Muñoz is a 45 y.o. female who presents to the Emergency Department for evaluation of headache onset 3 days ago. She locates her pain to her temples with occasional sharp pain to the top of head. She admits to additional symptoms of photophobia, vision changes, blurred vision, but denies neck pain, fever, and cough. Patient has a history of migraines however she states the severity of this headache is much worse than normal. Additionally her headaches only last a day or two and are typically alleviateds with Motrin or Tylenol, but it did not alleviated her symptoms this time. Patient reports family history of aneurysm and intracranial hemorrhage.    REVIEW OF SYSTEMS  Pertinent positives include headache and photophobia. Pertinent negatives include no fever and cough. As above, all other systems reviewed and are negative.   See HPI for further details.     PAST MEDICAL HISTORY   has a past medical history of Migraine.    SURGICAL HISTORY   has a past surgical history that includes cholecystectomy and other orthopedic surgery.    SOCIAL HISTORY  Social History     Tobacco Use   • Smoking status: Current Some Day Smoker     Packs/day: 0.25     Years: 22.00     Pack years: 5.50      "Types: Cigarettes     Last attempt to quit: 10/8/2010     Years since quitting: 10.2   • Smokeless tobacco: Never Used   Substance Use Topics   • Alcohol use: Not Currently   • Drug use: No      Social History     Substance and Sexual Activity   Drug Use No       FAMILY HISTORY  Family History   Problem Relation Age of Onset   • Arterial Aneurysm Father        CURRENT MEDICATIONS  Home Medications     Reviewed by Fred Yates (Pharmacy Tech) on 01/21/21 at 2343  Med List Status: Complete   Medication Last Dose Status   acetaminophen (TYLENOL) 500 MG Tab 1/21/2021 Active   Ascorbic Acid (VITAMIN C) 1000 MG Tab >3 days ago Active   azithromycin (ZITHROMAX) 250 MG Tab Not Taking Active   ibuprofen (MOTRIN) 200 MG TABS 1/21/2021 Active   Multiple Vitamins-Minerals (OCUVITE PO) >3 days ago Active   Phenyleph-Doxylamine-DM-APAP (TYLENOL COLD/FLU/COUGH NIGHT) 5-6.25- MG/15ML Liquid >2 weeks ago Active   Prenatal MV-Min-Fe Fum-FA-DHA (PRENATAL 1 PO) >3 days ago Active                ALLERGIES  Allergies   Allergen Reactions   • Amoxicillin Rash   • Food      bananas       PHYSICAL EXAM  VITAL SIGNS: /97   Pulse (!) 121   Temp 36.4 °C (97.5 °F) (Temporal)   Resp 16   Ht 1.676 m (5' 6\")   Wt 98.6 kg (217 lb 6 oz)   LMP 01/17/2021   SpO2 96%   BMI 35.09 kg/m²     Constitutional: Well developed, Well nourished, Non-toxic appearance.   HENT: Normocephalic, Atraumatic, Bilateral external ears normal, Oropharynx moist, No oral exudates.   Eyes: PERRLA, EOMI, Conjunctiva normal, No discharge.   Neck: No tenderness, Supple, No stridor.   Lymphatic: No lymphadenopathy noted.   Cardiovascular: Normal heart rate, Normal rhythm.   Thorax & Lungs: Clear to auscultation bilaterally, No respiratory distress, No wheezing, No crackles.   Abdomen: Soft, No tenderness, No masses, No pulsatile masses.   Skin: Warm, Dry, No erythema, No rash.   Extremities:, No edema No cyanosis.   Musculoskeletal: No tenderness to " palpation or major deformities noted.  Intact distal pulses  Neurologic: Awake, alert. Moves all extremities spontaneously.  Psychiatric: Affect normal, Judgment normal, Mood normal.     RADIOLOGY  DX-CHEST-PORTABLE (1 VIEW)   Final Result         1.  Basilar atelectasis versus early infiltrate      CT-HEAD W/O   Final Result         1.  Large calcified extra-axial mass at the left apex, appearance favors meningioma, there is significant mass effect on the left parietal lobe with surrounding mild vasogenic edema. Recommend follow-up characterization with MRI of the brain without and    with contrast.      These findings were discussed with the patient's clinician, Chang Haq, on 1/21/2021 10:24 PM.      MR-BRAIN-WITH & W/O    (Results Pending)     The radiologist's interpretation of all radiological studies have been reviewed by me.    COURSE & MEDICAL DECISION MAKING  Pertinent Labs & Imaging studies reviewed. (See chart for details)    9:23 PM - Patient seen and examined at bedside. We will treat the patient with pain medication and order a CT to evaluate her symptoms. Patient will be treated with Reglan 10 mg, Benadryl 25 mg, Decadron 4 mg, and Toradol 30 mg. Ordered CT-Head to evaluate her symptoms. The differential diagnoses include but are not limited to: migraine, headache, intracranial hemhorrhage, or brain tumor.     10:31 PM - Spoke with radiology concerning the patients CT which showed a large meningioma. Discussed these findings with the patient. [facility].    10:40 PM - I discussed the patient's case and the above findings with Dr. Soto (neuro surgery) who states the patient will need to be admitted to the hospital for surgery. Ordered MR brain. She will be treated with Keppra 500 mg. [facility] hospitalist.    11:01 PM - I discussed the patient's case and the above findings with Dr. Scanlon (Hospitalist) who agreed to evaluate patient for hospitalization. Patients care will be transferred at this time.      Decision Making:  The patient presenting with acute headache treated for possible.  Migraine with medications as above.  Subsequent work-up including CT scan showing a large mass probably meningioma.  This did discuss with the neurosurgeon requesting MRI of the brain with and without contrast and will be started on Keppra discussed with the hospitalist concerning admission  DISPOSITION:  Patient will be hospitalized by Dr. Scanlon in guarded condition.    FINAL IMPRESSION  Brain mass  Meningioma     Magda ZARATE (Puja), am scribing for, and in the presence of, Chang Haq M.D..    Electronically signed by: Magda Owens (Puja), 1/21/2021    Chang ZARATE M.D. personally performed the services described in this documentation, as scribed by Magda Owens in my presence, and it is both accurate and complete. C.      The note accurately reflects work and decisions made by me.  Chang Haq M.D.  1/22/2021  12:04 AM

## 2021-01-22 NOTE — PROGRESS NOTES
"Subjective:      Marni Muñoz is a 45 y.o. female who presents with Migraine (x 3 days and getting worse)            The patient is here complaining of \"this is the worst headache of my life.\" She does have a history of migraines but states for the past 3 days she has had the worst headaches she has ever had. Additionally, this episode she is experiencing vision changes including intermittent blurred vision and photosensitivity- she states her vision has never been affected before with previous migraines. She usually takes OTC Excedrin or Motrin and her headaches improve. This time she states nothing is touching her symptoms. She denies nausea or vomiting. She denies recent head injury. No numbness, weakness, or paralysis. She has positive family history of aneurysm and \"brainbleed.\" She describes her pain as 8/10. She states, \"I feel off.\"    No past medical history on file.    No past surgical history on file.    No family history on file.    Allergies   Allergen Reactions   • Amoxicillin Rash   • Food      bananas       Medications, Allergies, and current problem list reviewed today in Epic    Review of Systems   Constitutional: Negative for chills, fever and malaise/fatigue.   Eyes: Positive for blurred vision and photophobia.   Respiratory: Negative for cough, shortness of breath and wheezing.    Cardiovascular: Negative for chest pain, palpitations and leg swelling.   Gastrointestinal: Negative for abdominal pain, nausea and vomiting.   Skin: Negative for rash.   Neurological: Positive for dizziness and headaches. Negative for tingling, sensory change, focal weakness, loss of consciousness and weakness.          Objective:     /82 (BP Location: Left arm, Patient Position: Sitting, BP Cuff Size: Adult long)   Pulse (!) 118   Temp 36.3 °C (97.3 °F) (Temporal)   Resp 16   Ht 1.676 m (5' 6\")   Wt 97.1 kg (214 lb)   SpO2 95%   BMI 34.54 kg/m²      Physical Exam  Constitutional:       General: She is " "in acute distress.      Appearance: She is not ill-appearing.   HENT:      Head: Normocephalic and atraumatic.   Eyes:      Extraocular Movements: Extraocular movements intact.      Conjunctiva/sclera: Conjunctivae normal.      Pupils: Pupils are equal, round, and reactive to light.   Cardiovascular:      Rate and Rhythm: Regular rhythm. Tachycardia present.   Pulmonary:      Effort: Pulmonary effort is normal. No respiratory distress.      Breath sounds: No stridor. No wheezing.   Musculoskeletal: Normal range of motion.   Skin:     General: Skin is warm and dry.   Neurological:      General: No focal deficit present.      Mental Status: She is alert and oriented to person, place, and time.      Cranial Nerves: No cranial nerve deficit.      Sensory: No sensory deficit.      Motor: No weakness.      Gait: Gait normal.   Psychiatric:         Mood and Affect: Affect is blunt.         Speech: Speech normal.         Behavior: Behavior normal.         Thought Content: Thought content normal.         Cognition and Memory: Cognition and memory normal.         Judgment: Judgment normal.      Comments: Patient is tearful                 Assessment/Plan:        1. Worst headache of life    2. Blurred vision    At this time, I feel the patient requires a higher level of care including closer monitoring, stat lab work and/or imaging for further evaluation for complaints of \"worst headache of life with acute vision changes\" .This has been discussed with the patient and they state agreement and understanding.  The patient states she will have her friend drive her directly to the ED. The patient is in no acute distress upon clinic departure and will go directly to ED without delay.     Lisa Jordan P.A.-C.        "

## 2021-01-22 NOTE — PROGRESS NOTES
[facility] Medicine Daily Progress Note    Date of Service  1/22/2021    Chief Complaint  45 y.o. female admitted 1/21/2021 with headache    Hospital Course  Ms. Marni Muñoz is a 45 y.o. female who presented on 1/21/2021 with severe migraine x3 days.  Patient has history of migraines controlled with Tylenol and ibuprofen.  Her current migraine is more persistent and intense.  Initially presented to an urgent care, but was referred to the emergency room.  CT head showed a large calcified extra-axial mass in the left apex consistent with meningioma with significant mass-effect on left parietal lobe with surrounding mild vasogenic edema.  MRI brain shows left parietal meningioma with mass-effect on the parietal lobe with mild vasogenic edema.  Patient was started on Decadron and levetiracetam.  She was followed by neurosurgery with plan for craniectomy.       Interval Problem Update  Patient was seen and examined at bedside.  I have personally reviewed vitals, labs, and imaging.    1/22.  Afebrile.  Initially presented with tachycardia which has resolved.  On room air.  Denies fever, chills, chest pains or shortness of breath.  Patient reports headache is improved described as pressure and hot poker feeling.  Visual symptoms have resolved.  Plan for craniectomy Monday    Consultants/Specialty  NSG    Code Status  Full Code    Disposition  Medical clearance    Review of Systems  Review of Systems   Constitutional: Negative for chills and fever.   HENT: Negative for congestion and sore throat.    Eyes: Negative for blurred vision (Resolved).   Respiratory: Negative for cough and shortness of breath.    Cardiovascular: Negative for chest pain, palpitations and leg swelling.   Gastrointestinal: Negative for abdominal pain, constipation, diarrhea, nausea and vomiting.   Genitourinary: Negative for dysuria, frequency and urgency.   Musculoskeletal: Negative for falls.   Skin: Negative for rash.   Neurological: Positive for  headaches. Negative for dizziness and weakness.   Psychiatric/Behavioral: Negative for depression. The patient is not nervous/anxious.    All other systems reviewed and are negative.       Physical Exam  Temp:  [36.1 °C (97 °F)-36.7 °C (98.1 °F)] 36.7 °C (98.1 °F)  Pulse:  [] 87  Resp:  [16] 16  BP: (109-145)/(65-97) 122/70  SpO2:  [95 %-97 %] 95 %    Physical Exam  Vitals signs and nursing note reviewed.   Constitutional:       General: She is not in acute distress.     Appearance: Normal appearance. She is obese.   HENT:      Head: Normocephalic and atraumatic.      Nose: Nose normal.      Mouth/Throat:      Mouth: Mucous membranes are moist.      Pharynx: Oropharynx is clear. No oropharyngeal exudate or posterior oropharyngeal erythema.   Eyes:      Extraocular Movements: Extraocular movements intact.      Conjunctiva/sclera: Conjunctivae normal.   Neck:      Musculoskeletal: Normal range of motion and neck supple.   Cardiovascular:      Rate and Rhythm: Normal rate and regular rhythm.      Pulses: Normal pulses.      Heart sounds: Normal heart sounds. No murmur.   Pulmonary:      Effort: Pulmonary effort is normal. No respiratory distress.      Breath sounds: Normal breath sounds. No stridor. No wheezing or rales.   Abdominal:      General: Abdomen is flat. Bowel sounds are normal. There is no distension.      Palpations: Abdomen is soft. There is no mass.      Tenderness: There is no abdominal tenderness.   Skin:     General: Skin is warm.      Capillary Refill: Capillary refill takes less than 2 seconds.   Neurological:      General: No focal deficit present.      Mental Status: She is alert and oriented to person, place, and time. Mental status is at baseline.      Cranial Nerves: No cranial nerve deficit.      Motor: No weakness.   Psychiatric:         Mood and Affect: Mood normal.         Behavior: Behavior normal.         Fluids  No intake or output data in the 24 hours ending 01/22/21  0717    Laboratory  Recent Labs     01/21/21 2117   WBC 10.6   RBC 5.11   HEMOGLOBIN 16.0   HEMATOCRIT 47.8*   MCV 93.5   MCH 31.3   MCHC 33.5*   RDW 42.7   PLATELETCT 406   MPV 10.9     Recent Labs     01/21/21 2117   SODIUM 137   POTASSIUM 4.5   CHLORIDE 103   CO2 21   GLUCOSE 239*   BUN 11   CREATININE 0.64   CALCIUM 9.5     Recent Labs     01/21/21 2117   APTT 35.9   INR 0.97               Imaging  MR-BRAIN-WITH & W/O   Final Result      1.  43 x 42 x 44 mm left parietal parafalcine vertex meningioma with involvement of the superior sagittal sinus and the parietal calvarium. There is prominent mass effect on the left parietal lobe with mild vasogenic edema.   2.  7 mm intra-axial lesion in the right frontoparietal region with blooming artifact on GRE and a couple additional tiny foci of blooming artifact in the right temporal lobe and left parietotemporal region. These probably represent small cavernous    malformations however follow-up is suggested or recommend comparison with old outside prior brain MRI studies.   3.  No acute infarct or intracranial hemorrhage.      DX-CHEST-PORTABLE (1 VIEW)   Final Result         1.  Basilar atelectasis versus early infiltrate      CT-HEAD W/O   Final Result         1.  Large calcified extra-axial mass at the left apex, appearance favors meningioma, there is significant mass effect on the left parietal lobe with surrounding mild vasogenic edema. Recommend follow-up characterization with MRI of the brain without and    with contrast.      These findings were discussed with the patient's clinician, Chang Haq, on 1/21/2021 10:24 PM.           Assessment/Plan  Meningioma (HCC)- (present on admission)  Assessment & Plan  Severe migraine w/ vision changes x3 days  History of migraines since 16years old  No other focal neuro deficits noted  CT head w/ large calcified meningioma w/ mass effect and vasogenic edema  S/p Decadron 4mg, Toradol 30mg, and metoclopramide 10mg  -  will initiate Keppra 500mg BID  - Decadron 10mg q6h  - symptomatic management  - MRI brain with left parietal meningioma  - Neurosurgery (Dr. Soto) consulted surgery Monday 1/25       VTE prophylaxis: Enoxaparin

## 2021-01-22 NOTE — NON-PROVIDER
Medical Student Daily Progress Note     Date of Service  1/22/2021     Chief Complaint  45 y.o. female admitted on 1/21 for worsening headache      Hospital Course  This is a 45-year-old female with chronic migraines since 17yo who presented on 1/21/2021 with severe, worsening headache. Patient states she usually takes tylenol and ibuprofen OTC which resolves symptoms. However, headache continued to persist for 3 days. Reports pain is sharp, located at temporal region bilaterally and radiates to front of her head. Associated with photophobia, double vision, blurry vision, and nausea. Denies fevers, chills, chest pain, shortness of breath, vomiting, weakness, tingling, or numbness. Patient's family history notable for intracranial bleeding in her father who had surgery 6 months ago, and history of aneurysm in aunt.      While in ED, vitals stable, labs unremarkable. CT head showed large calcified left apex meningioma with significant mass effect on left parietal lobe with evidence of vasogenic edema. Given decadron 4 mg, benadryl 25 mg, toradol 30 mg, and metoclopramide 10 mg. Neurosurgery was consulted, recommended MRI of brain, with possible OR next week.     Admitted to hospitalist service.      Interval Problem Update  Afebrile, vitals stable. Saturating well on room air.   Patient reports she is doing better this morning. States headache improved, less pressure. Vision changes/blurry vision resolved. Denies fevers, chills, nausea, vomiting, or other focal neurological deficits.      Consultants/Specialty  Neurosurgery     Code Status  Full Code     Disposition  Pending medical clearance     Review of Systems   Review of Systems   Constitutional: Negative for chills and fever.  Respiratory: Negative for cough, sputum production.    Cardiovascular: Negative for chest pain and palpitations.   Gastrointestinal: Negative for abdominal pain, diarrhea, nausea, vomiting.   Genitourinary: Negative  for dysuria, frequency and urgency.   Musculoskeletal: Negative for back pain, myalgias and neck pain.   Skin: Negative for itching.   Neurological: Positive for headaches. Negative for loss of consciousness, dizziness, weakness, tingling, or tremors     Physical Exam  Temp: [36.1 °C - 36.7 °C]  36.4 °C (97.5 °F)  Pulse: [81 - 121] 88  BP: [109 - 145 / 66 - 97] 118 / 70  Resp: 16  SpO2: [94 %  - 97 %] 94 %      Physical Exam  Vitals signs and nursing note reviewed.   Constitutional:       General: She is not in acute distress.     Appearance: Normal appearance, not toxic-appearing.   HENT:      Head: Normocephalic and atraumatic.      Nose: Nose normal.      Mouth/Throat:      Mouth: Mucous membranes are moist.      Pharynx: Oropharynx is clear.   Eyes:     Pupils: equal and reactive to light bilaterally      Extraocular Movements: Extraocular movements intact.   Neck:      Musculoskeletal: Normal range of motion and neck supple. No jugular venous distension.   Cardiovascular:      Rate and Rhythm: regular rate and regular rhythm.      Pulses: Normal pulses.      Heart sounds: Normal heart sounds. No murmur.   Pulmonary:      Effort: No increased work of breathing. No respiratory distress.      Breath sounds: Normal breath sounds.   Chest:      Chest wall: No tenderness.   Abdominal:      General: Bowel sounds are normal. There is no distension.      Palpations: Abdomen is soft. No masses noted.      Tenderness: There is no abdominal tenderness. There is no guarding or rigidity.   Musculoskeletal: Normal range of motion.   Skin:     General: Skin is warm, dry     Capillary refill: < 2 seconds  Neurological:      General: No focal deficit present.      Mental Status: She is alert and oriented to person, place, and time. Mental status is at baseline.      Cranial Nerves: No cranial nerve deficit.      Motor: No weakness.      Sensory: grossly intact   Psychiatric:         Mood and Affect: Mood normal.        Fluids:  No intake or output data in the 24 hours ending 01/22/21 0901    Laboratory:      1/21/2021 21:17   WBC 10.6   RBC 5.11   Hemoglobin 16.0   Hematocrit 47.8 (H)   MCV 93.5   MCH 31.3   MCHC 33.5 (L)   RDW 42.7   Platelet Count 406   MPV 10.9      1/21/2021 21:17   Sodium 137   Potassium 4.5   Chloride 103   Co2 21   Anion Gap 13.0   Glucose 239 (H)   Bun 11   Creatinine 0.64     Imaging:    CT-HEAD W/O   Final Result           1.  Large calcified extra-axial mass at the left apex, appearance favors meningioma, there is significant mass effect on the left parietal lobe with surrounding mild vasogenic edema. Recommend follow-up characterization with MRI of the brain without and    with contrast.       These findings were discussed with the patient's clinician, Chang Haq, on 1/21/2021 10:24 PM.     DX- CHEST- PORTABLE  Final Result     1.  Basilar atelectasis versus early infiltrate    MR-BRAIN-WITH & W/O  Final Result    1.  43 x 42 x 44 mm left parietal parafalcine vertex meningioma with involvement of the superior sagittal sinus and the parietal calvarium. There is prominent mass effect on the left parietal lobe with mild vasogenic edema.  2.  7 mm intra-axial lesion in the right frontoparietal region with blooming artifact on GRE and a couple additional tiny foci of blooming artifact in the right temporal lobe and left parietotemporal region. These probably represent small cavernous   malformations however follow-up is suggested or recommend comparison with old outside prior brain MRI studies.  3.  No acute infarct or intracranial hemorrhage.    Assessment/Plan:    Meningioma (HCC)- (present on admission) Hyperglycemia  Assessment & Plan       History of migraines       Presented on 1/21 with severe migraine/headache with changes in vision for 3 days. Negative for other focal neurological deficits.        CT head showed large calcified meningioma with mass effect on L parietal lobe and evidence  of vasogenic edema       MRI brain confirmed left parietal parafalcine vertex meningioma with prominent mass effect on L parietal lobe       Dr. Soto, neurosurgery, consulted with plan for OR next week       - Appreciate neurosurgery recs       - continue decadron 10 mg q6h       - continue Keppra 500 mg BID    Hyperglycemia- (present on admission)   Assessment & Plan        No history of diabetes mellitus        UA shows glucosuria, + trace ketones        Random glc 239        - Accu-checks         - ISS as needed    VTE prophylaxis: Lovenox, SCDs

## 2021-01-22 NOTE — H&P
Hospital Medicine History & Physical Note    Date of Service  1/21/2021    Primary Care Physician  Jeane Pham M.D.    Consultants  Neurosurgery    Code Status  Full Code    Chief Complaint  Chief Complaint   Patient presents with   • Headache     x 3 days and worsening; pt reports the area of pain is all the way around the front of her head; pt reports hx of migraines and that this one feels much worse and it not alleviated with her typical antinflamatory meds; pt reports vision changes including things appearing different colors between eyes, diplopia, and blurred vision   • Sent from Urgent Care     pt has familial of aneurysms and brain bleeds   • Nausea   • Diarrhea       History of Presenting Illness  45 y.o. female who presented 1/21/2021 with complaints of severe migraine. Pt reports she has had history of migraines since she was 17yo. She normally takes tylenol and ibuprofen and her symptoms resolve. However, this time, she states her migraine is much more severe than it usually is and has been persisting about 3 days.  Her pain is located in her temples and is sharp pain that radiates to to the top of her head.  She additionally reports photophobia, double vision, blurred vision, as well as some color changes. Pt went to the urgent care today, but was referred to [facility] ED. She currently denies any fevers, chills, chest pain, SOB, n/v, or other neuro deficits. Does have family history of aneurysm/bleed in her father which he has just completed surgery for 6 months ago.     ED: Vitals w/ ->89, otherwise unremarkable. CBC unremarkable, CMP pending. CT head was performed w/ large calcified L apex meningioma w/ significant mass effect L parietal lobe and vasogenic edema. She was given Decadron 4mg, Benradryl 25mg, toradol 30mg, and metoclorpamide 10mg. Neurosurgery was consulted.     Review of Systems  Review of Systems   Constitutional: Negative for chills, fever and weight loss.   HENT: Negative for  hearing loss and sore throat.    Eyes: Positive for blurred vision, double vision and photophobia. Negative for pain.        Near sited right eye, far sited left eye, astygmatism   Respiratory: Negative for cough, hemoptysis, shortness of breath and wheezing.    Cardiovascular: Negative for chest pain, palpitations, orthopnea and leg swelling.   Gastrointestinal: Negative for abdominal pain, blood in stool, constipation, diarrhea, nausea and vomiting.   Genitourinary: Negative for dysuria and hematuria.   Musculoskeletal: Negative for joint pain and myalgias.   Skin: Negative for rash.   Neurological: Positive for headaches. Negative for dizziness, loss of consciousness and weakness.       Past Medical History   has a past medical history of Migraine.    Surgical History   has a past surgical history that includes cholecystectomy and other orthopedic surgery.     Family History  family history includes Arterial Aneurysm in her father.     Social History   reports that she has been smoking cigarettes. She has a 5.50 pack-year smoking history. She has never used smokeless tobacco. She reports previous alcohol use. She reports that she does not use drugs.    Allergies  Allergies   Allergen Reactions   • Amoxicillin Rash   • Food      bananas       Medications  Prior to Admission Medications   Prescriptions Last Dose Informant Patient Reported? Taking?   acetaminophen (TYLENOL) 500 MG Tab   Yes No   Sig: Take 500-1,000 mg by mouth every 6 hours as needed.   azithromycin (ZITHROMAX) 250 MG Tab   No No   Sig: Take as directed on package. Dispense one package.   ibuprofen (MOTRIN) 200 MG TABS   Yes No   Sig: Take 200 mg by mouth every 6 hours as needed.     ibuprofen (MOTRIN) 600 MG TABS   Yes No   Sig: Take 600 mg by mouth every 6 hours as needed.   ibuprofen (MOTRIN) 800 MG TABS   Yes No   Sig: Take 800 mg by mouth every 6 hours as needed.      Facility-Administered Medications: None       Physical Exam  Temp:  [36.4 °C  (97.5 °F)] 36.4 °C (97.5 °F)  Pulse:  [] 89  Resp:  [16] 16  BP: (114-145)/(65-97) 114/65  SpO2:  [96 %] 96 %    Physical Exam  Vitals signs and nursing note reviewed.   Constitutional:       General: She is not in acute distress.     Appearance: Normal appearance. She is not ill-appearing.   HENT:      Mouth/Throat:      Mouth: Mucous membranes are moist.   Eyes:      Extraocular Movements: Extraocular movements intact.   Cardiovascular:      Rate and Rhythm: Normal rate and regular rhythm.      Pulses: Normal pulses.      Heart sounds: Normal heart sounds. No murmur. No gallop.    Pulmonary:      Effort: Pulmonary effort is normal. No respiratory distress.      Breath sounds: No wheezing, rhonchi or rales.   Abdominal:      General: Abdomen is flat. Bowel sounds are normal.      Palpations: Abdomen is soft.      Tenderness: There is no abdominal tenderness.   Musculoskeletal: Normal range of motion.         General: No deformity.      Right lower leg: No edema.      Left lower leg: No edema.   Skin:     General: Skin is warm and dry.      Coloration: Skin is not jaundiced.      Findings: No rash.   Neurological:      General: No focal deficit present.      Mental Status: She is alert and oriented to person, place, and time.      Motor: No weakness.   Psychiatric:         Mood and Affect: Mood normal.         Laboratory:  Recent Labs     01/21/21  2117   WBC 10.6   RBC 5.11   HEMOGLOBIN 16.0   HEMATOCRIT 47.8*   MCV 93.5   MCH 31.3   MCHC 33.5*   RDW 42.7   PLATELETCT 406   MPV 10.9         No results for input(s): ALTSGPT, ASTSGOT, ALKPHOSPHAT, TBILIRUBIN, DBILIRUBIN, GAMMAGT, AMYLASE, LIPASE, ALB, PREALBUMIN, GLUCOSE in the last 72 hours.      No results for input(s): NTPROBNP in the last 72 hours.      No results for input(s): TROPONINT in the last 72 hours.    Imaging:  CT-HEAD W/O   Final Result         1.  Large calcified extra-axial mass at the left apex, appearance favors meningioma, there is  significant mass effect on the left parietal lobe with surrounding mild vasogenic edema. Recommend follow-up characterization with MRI of the brain without and    with contrast.      These findings were discussed with the patient's clinician, Chang Haq, on 1/21/2021 10:24 PM.      DX-CHEST-PORTABLE (1 VIEW)    (Results Pending)   MR-BRAIN-WITH & W/O    (Results Pending)         Assessment/Plan:  I anticipate this patient is appropriate for observation status at this time.    Meningioma (HCC)- (present on admission)  Assessment & Plan  Severe migraine w/ vision changes x3 days  History of migraines since 16years old  No other focal neuro deficits noted  Vitals unremarkable  CBC unremarkable. CMP pending  CT head w/ large calcified meningioma w/ mass effect and vasogenic edema  S/p Decadron 4mg, Toradol 30mg, and metoclopramide 10mg  - will initiate Keppra 500mg BID  - Decadron 10mg q6h  - symptomatic management  - MRI brain w/o and w/ ordered  - Neurosurgery (Dr. Soto) consulted, possible surgery next week    VTE: SCD, Lovenox

## 2021-01-22 NOTE — PROGRESS NOTES
Neurosurgery Progress Note    Subjective:  Pt awake, alert  Reports head pressure and vision issues improved today  No other neurological complaints       Exam:    A&O x3, GCS 15  PERRL, EOMI  Face symm, tongue midline  HOLM with FS, no drift    BP  Min: 109/66  Max: 145/97  Pulse  Av.6  Min: 81  Max: 121  Resp  Av  Min: 16  Max: 16  Temp  Av.4 °C (97.5 °F)  Min: 36.1 °C (97 °F)  Max: 36.7 °C (98.1 °F)  SpO2  Av.5 %  Min: 94 %  Max: 97 %    No data recorded    Recent Labs     21   WBC 10.6   RBC 5.11   HEMOGLOBIN 16.0   HEMATOCRIT 47.8*   MCV 93.5   MCH 31.3   MCHC 33.5*   RDW 42.7   PLATELETCT 406   MPV 10.9     Recent Labs     21   SODIUM 137   POTASSIUM 4.5   CHLORIDE 103   CO2 21   GLUCOSE 239*   BUN 11   CREATININE 0.64   CALCIUM 9.5     Recent Labs     21   APTT 35.9   INR 0.97           Intake/Output     None          No intake or output data in the 24 hours ending 21 0913         • dexamethasone  10 mg Q6HRS   • senna-docusate  2 Tab BID    And   • polyethylene glycol/lytes  1 Packet QDAY PRN    And   • magnesium hydroxide  30 mL QDAY PRN    And   • bisacodyl  10 mg QDAY PRN   • enoxaparin  40 mg DAILY   • acetaminophen  650 mg Q6HRS PRN   • ondansetron  4 mg Q4HRS PRN   • ondansetron  4 mg Q4HRS PRN   • promethazine  12.5-25 mg Q4HRS PRN   • promethazine  12.5-25 mg Q4HRS PRN   • prochlorperazine  5-10 mg Q4HRS PRN   • levETIRAcetam  500 mg BID       Assessment and Plan:  Hospital day #2 large left extraaxial calcified mass c/w meningioma sig mass effect  POD #na  Prophylactic anticoagulation: yes         Start date/time: last dose am 21  keppra 500 bid  Dec 10Q6  MRI brain w/ and w/o stealth protocol completed - Brittany to review  OR next Monday on 21  NPO after mn on   EKG ordered    ATTENDING ADDENDUM:  Patient seen independently and agree with above note  discused risks and benefits; pt ready for surgery monday

## 2021-01-22 NOTE — ASSESSMENT & PLAN NOTE
Status post craniotomy and resection on 1/29/2021  On Decadron taper neurosurgery following  On Keppra and Vimpat per neurology

## 2021-01-23 PROBLEM — R73.9 ELEVATED BLOOD SUGAR: Status: ACTIVE | Noted: 2021-01-23

## 2021-01-23 LAB
ALBUMIN SERPL BCP-MCNC: 4.6 G/DL (ref 3.2–4.9)
ALBUMIN/GLOB SERPL: 1.6 G/DL
ALP SERPL-CCNC: 64 U/L (ref 30–99)
ALT SERPL-CCNC: 10 U/L (ref 2–50)
ANION GAP SERPL CALC-SCNC: 14 MMOL/L (ref 7–16)
AST SERPL-CCNC: 9 U/L (ref 12–45)
BASOPHILS # BLD AUTO: 0.1 % (ref 0–1.8)
BASOPHILS # BLD: 0.02 K/UL (ref 0–0.12)
BILIRUB SERPL-MCNC: 0.7 MG/DL (ref 0.1–1.5)
BUN SERPL-MCNC: 19 MG/DL (ref 8–22)
CALCIUM SERPL-MCNC: 9.9 MG/DL (ref 8.5–10.5)
CHLORIDE SERPL-SCNC: 96 MMOL/L (ref 96–112)
CO2 SERPL-SCNC: 22 MMOL/L (ref 20–33)
CREAT SERPL-MCNC: 0.81 MG/DL (ref 0.5–1.4)
EOSINOPHIL # BLD AUTO: 0 K/UL (ref 0–0.51)
EOSINOPHIL NFR BLD: 0 % (ref 0–6.9)
ERYTHROCYTE [DISTWIDTH] IN BLOOD BY AUTOMATED COUNT: 41.3 FL (ref 35.9–50)
GLOBULIN SER CALC-MCNC: 2.9 G/DL (ref 1.9–3.5)
GLUCOSE BLD-MCNC: 540 MG/DL (ref 65–99)
GLUCOSE BLD-MCNC: 559 MG/DL (ref 65–99)
GLUCOSE SERPL-MCNC: 464 MG/DL (ref 65–99)
HCT VFR BLD AUTO: 44.8 % (ref 37–47)
HGB BLD-MCNC: 15 G/DL (ref 12–16)
IMM GRANULOCYTES # BLD AUTO: 0.13 K/UL (ref 0–0.11)
IMM GRANULOCYTES NFR BLD AUTO: 0.8 % (ref 0–0.9)
LYMPHOCYTES # BLD AUTO: 1.43 K/UL (ref 1–4.8)
LYMPHOCYTES NFR BLD: 9.2 % (ref 22–41)
MAGNESIUM SERPL-MCNC: 2.2 MG/DL (ref 1.5–2.5)
MCH RBC QN AUTO: 30.9 PG (ref 27–33)
MCHC RBC AUTO-ENTMCNC: 33.5 G/DL (ref 33.6–35)
MCV RBC AUTO: 92.4 FL (ref 81.4–97.8)
MONOCYTES # BLD AUTO: 0.33 K/UL (ref 0–0.85)
MONOCYTES NFR BLD AUTO: 2.1 % (ref 0–13.4)
NEUTROPHILS # BLD AUTO: 13.71 K/UL (ref 2–7.15)
NEUTROPHILS NFR BLD: 87.8 % (ref 44–72)
NRBC # BLD AUTO: 0 K/UL
NRBC BLD-RTO: 0 /100 WBC
PHOSPHATE SERPL-MCNC: 4.4 MG/DL (ref 2.5–4.5)
PLATELET # BLD AUTO: 397 K/UL (ref 164–446)
PMV BLD AUTO: 10.4 FL (ref 9–12.9)
POTASSIUM SERPL-SCNC: 4.8 MMOL/L (ref 3.6–5.5)
PROT SERPL-MCNC: 7.5 G/DL (ref 6–8.2)
RBC # BLD AUTO: 4.85 M/UL (ref 4.2–5.4)
SODIUM SERPL-SCNC: 132 MMOL/L (ref 135–145)
WBC # BLD AUTO: 15.6 K/UL (ref 4.8–10.8)

## 2021-01-23 PROCEDURE — 84100 ASSAY OF PHOSPHORUS: CPT

## 2021-01-23 PROCEDURE — 36415 COLL VENOUS BLD VENIPUNCTURE: CPT

## 2021-01-23 PROCEDURE — 700111 HCHG RX REV CODE 636 W/ 250 OVERRIDE (IP): Performed by: NURSE PRACTITIONER

## 2021-01-23 PROCEDURE — 700102 HCHG RX REV CODE 250 W/ 637 OVERRIDE(OP): Performed by: STUDENT IN AN ORGANIZED HEALTH CARE EDUCATION/TRAINING PROGRAM

## 2021-01-23 PROCEDURE — 80053 COMPREHEN METABOLIC PANEL: CPT

## 2021-01-23 PROCEDURE — 82962 GLUCOSE BLOOD TEST: CPT

## 2021-01-23 PROCEDURE — A9270 NON-COVERED ITEM OR SERVICE: HCPCS | Performed by: STUDENT IN AN ORGANIZED HEALTH CARE EDUCATION/TRAINING PROGRAM

## 2021-01-23 PROCEDURE — 700102 HCHG RX REV CODE 250 W/ 637 OVERRIDE(OP): Performed by: INTERNAL MEDICINE

## 2021-01-23 PROCEDURE — 99232 SBSQ HOSP IP/OBS MODERATE 35: CPT | Performed by: INTERNAL MEDICINE

## 2021-01-23 PROCEDURE — 83735 ASSAY OF MAGNESIUM: CPT

## 2021-01-23 PROCEDURE — 770006 HCHG ROOM/CARE - MED/SURG/GYN SEMI*

## 2021-01-23 PROCEDURE — 85025 COMPLETE CBC W/AUTO DIFF WBC: CPT

## 2021-01-23 RX ORDER — DEXTROSE MONOHYDRATE 25 G/50ML
50 INJECTION, SOLUTION INTRAVENOUS
Status: DISCONTINUED | OUTPATIENT
Start: 2021-01-23 | End: 2021-01-28

## 2021-01-23 RX ADMIN — DOCUSATE SODIUM 50 MG AND SENNOSIDES 8.6 MG 2 TABLET: 8.6; 5 TABLET, FILM COATED ORAL at 05:31

## 2021-01-23 RX ADMIN — ACETAMINOPHEN 650 MG: 325 TABLET, FILM COATED ORAL at 05:31

## 2021-01-23 RX ADMIN — ENOXAPARIN SODIUM 40 MG: 40 INJECTION SUBCUTANEOUS at 05:31

## 2021-01-23 RX ADMIN — DEXAMETHASONE 10 MG: 4 TABLET ORAL at 23:36

## 2021-01-23 RX ADMIN — DIPHENHYDRAMINE HYDROCHLORIDE 25 MG: 25 TABLET ORAL at 23:36

## 2021-01-23 RX ADMIN — DEXAMETHASONE 10 MG: 4 TABLET ORAL at 05:31

## 2021-01-23 RX ADMIN — ACETAMINOPHEN 650 MG: 325 TABLET, FILM COATED ORAL at 22:25

## 2021-01-23 RX ADMIN — DEXAMETHASONE 10 MG: 4 TABLET ORAL at 16:36

## 2021-01-23 RX ADMIN — DOCUSATE SODIUM 50 MG AND SENNOSIDES 8.6 MG 2 TABLET: 8.6; 5 TABLET, FILM COATED ORAL at 16:36

## 2021-01-23 RX ADMIN — INSULIN HUMAN 14 UNITS: 100 INJECTION, SOLUTION PARENTERAL at 16:41

## 2021-01-23 RX ADMIN — LEVETIRACETAM 500 MG: 500 TABLET ORAL at 05:31

## 2021-01-23 RX ADMIN — DEXAMETHASONE 10 MG: 4 TABLET ORAL at 11:57

## 2021-01-23 RX ADMIN — INSULIN HUMAN 14 UNITS: 100 INJECTION, SOLUTION PARENTERAL at 23:49

## 2021-01-23 RX ADMIN — LEVETIRACETAM 500 MG: 500 TABLET ORAL at 16:36

## 2021-01-23 ASSESSMENT — ENCOUNTER SYMPTOMS
CONSTIPATION: 0
COUGH: 0
FEVER: 0
FALLS: 0
CHILLS: 0
NERVOUS/ANXIOUS: 0
HEADACHES: 1
PALPITATIONS: 0
NAUSEA: 0
SHORTNESS OF BREATH: 0
DIZZINESS: 0
BLURRED VISION: 0
DEPRESSION: 0
VOMITING: 0
DIARRHEA: 0
ABDOMINAL PAIN: 0
WEAKNESS: 0
SORE THROAT: 0

## 2021-01-23 ASSESSMENT — PAIN DESCRIPTION - PAIN TYPE: TYPE: ACUTE PAIN

## 2021-01-23 NOTE — PROGRESS NOTES
[facility] Medicine Daily Progress Note    Date of Service  1/23/2021    Chief Complaint  45 y.o. female admitted 1/21/2021 with headache    Hospital Course  Ms. Marni Muñoz is a 45 y.o. female who presented on 1/21/2021 with severe migraine x3 days.  Patient has history of migraines controlled with Tylenol and ibuprofen.  Her current migraine is more persistent and intense.  Initially presented to an urgent care, but was referred to the emergency room.  CT head showed a large calcified extra-axial mass in the left apex consistent with meningioma with significant mass-effect on left parietal lobe with surrounding mild vasogenic edema.  MRI brain shows left parietal meningioma with mass-effect on the parietal lobe with mild vasogenic edema.  Patient was started on Decadron and levetiracetam.  She was followed by neurosurgery with plan for craniectomy.       Interval Problem Update  Patient was seen and examined at bedside.  I have personally reviewed vitals, labs, and imaging.    1/22.  Afebrile.  Initially presented with tachycardia which has resolved.  On room air.  Denies fever, chills, chest pains or shortness of breath.  Patient reports headache is improved described as pressure and hot poker feeling.  Visual symptoms have resolved.  Plan for craniectomy Monday 1/23.  Afebrile.  Episodes of tachycardia.  On room air.  Denies fever, chills, chest pains, shortness of breath.  Headaches and vision changes have resolved.  Lack of p.o. intake.  Has had hyperglycemia likely secondary to Decadron.  No acidosis on BMP.    Consultants/Specialty  NSG    Code Status  Full Code    Disposition  Medical clearance    Review of Systems  Review of Systems   Constitutional: Negative for chills and fever.   HENT: Negative for congestion and sore throat.    Eyes: Negative for blurred vision (Resolved).   Respiratory: Negative for cough and shortness of breath.    Cardiovascular: Negative for chest pain, palpitations and leg  swelling.   Gastrointestinal: Negative for abdominal pain, constipation, diarrhea, nausea and vomiting.   Genitourinary: Negative for dysuria, frequency and urgency.   Musculoskeletal: Negative for falls.   Skin: Negative for rash.   Neurological: Positive for headaches. Negative for dizziness and weakness.   Psychiatric/Behavioral: Negative for depression. The patient is not nervous/anxious.    All other systems reviewed and are negative.       Physical Exam  Temp:  [36 °C (96.8 °F)-36.9 °C (98.4 °F)] 36.1 °C (97 °F)  Pulse:  [] 88  Resp:  [16-18] 16  BP: (100-122)/(64-82) 105/67  SpO2:  [90 %-98 %] 90 %    Physical Exam  Vitals signs and nursing note reviewed.   Constitutional:       General: She is not in acute distress.     Appearance: Normal appearance. She is obese.   HENT:      Head: Normocephalic and atraumatic.      Nose: Nose normal.      Mouth/Throat:      Mouth: Mucous membranes are moist.      Pharynx: Oropharynx is clear. No oropharyngeal exudate or posterior oropharyngeal erythema.   Eyes:      Extraocular Movements: Extraocular movements intact.      Conjunctiva/sclera: Conjunctivae normal.   Neck:      Musculoskeletal: Normal range of motion and neck supple.   Cardiovascular:      Rate and Rhythm: Normal rate and regular rhythm.      Pulses: Normal pulses.      Heart sounds: Normal heart sounds. No murmur.   Pulmonary:      Effort: Pulmonary effort is normal. No respiratory distress.      Breath sounds: Normal breath sounds. No stridor. No wheezing or rales.   Abdominal:      General: Abdomen is flat. Bowel sounds are normal. There is no distension.      Palpations: Abdomen is soft. There is no mass.      Tenderness: There is no abdominal tenderness.   Skin:     General: Skin is warm.      Capillary Refill: Capillary refill takes less than 2 seconds.   Neurological:      General: No focal deficit present.      Mental Status: She is alert and oriented to person, place, and time. Mental status  is at baseline.      Cranial Nerves: No cranial nerve deficit.      Motor: No weakness.   Psychiatric:         Mood and Affect: Mood normal.         Behavior: Behavior normal.         Fluids    Intake/Output Summary (Last 24 hours) at 1/23/2021 0845  Last data filed at 1/22/2021 1400  Gross per 24 hour   Intake 640 ml   Output --   Net 640 ml       Laboratory  Recent Labs     01/21/21 2117   WBC 10.6   RBC 5.11   HEMOGLOBIN 16.0   HEMATOCRIT 47.8*   MCV 93.5   MCH 31.3   MCHC 33.5*   RDW 42.7   PLATELETCT 406   MPV 10.9     Recent Labs     01/21/21 2117   SODIUM 137   POTASSIUM 4.5   CHLORIDE 103   CO2 21   GLUCOSE 239*   BUN 11   CREATININE 0.64   CALCIUM 9.5     Recent Labs     01/21/21 2117   APTT 35.9   INR 0.97               Imaging  MR-BRAIN-WITH & W/O   Final Result      1.  43 x 42 x 44 mm left parietal parafalcine vertex meningioma with involvement of the superior sagittal sinus and the parietal calvarium. There is prominent mass effect on the left parietal lobe with mild vasogenic edema.   2.  7 mm intra-axial lesion in the right frontoparietal region with blooming artifact on GRE and a couple additional tiny foci of blooming artifact in the right temporal lobe and left parietotemporal region. These probably represent small cavernous    malformations however follow-up is suggested or recommend comparison with old outside prior brain MRI studies.   3.  No acute infarct or intracranial hemorrhage.      DX-CHEST-PORTABLE (1 VIEW)   Final Result         1.  Basilar atelectasis versus early infiltrate      CT-HEAD W/O   Final Result         1.  Large calcified extra-axial mass at the left apex, appearance favors meningioma, there is significant mass effect on the left parietal lobe with surrounding mild vasogenic edema. Recommend follow-up characterization with MRI of the brain without and    with contrast.      These findings were discussed with the patient's clinician, Chang Haq, on 1/21/2021 10:24  PM.           Assessment/Plan  Elevated blood sugar  Assessment & Plan  Likely secondary to Decadron.  Check A1c.  No acidosis on BMP  Check beta hydroxybutyric acid  Start insulin sliding scale  Encourage fluid intake      Meningioma (HCC)- (present on admission)  Assessment & Plan  Severe migraine w/ vision changes x3 days  History of migraines since 16years old  No other focal neuro deficits noted  CT head w/ large calcified meningioma w/ mass effect and vasogenic edema  S/p Decadron 4mg, Toradol 30mg, and metoclopramide 10mg  - will initiate Keppra 500mg BID  - Decadron 10mg q6h  - symptomatic management  - MRI brain with left parietal meningioma  - Neurosurgery (Dr. Soto) consulted surgery Monday 1/25       VTE prophylaxis: Enoxaparin

## 2021-01-23 NOTE — CARE PLAN
Problem: Communication  Goal: The ability to communicate needs accurately and effectively will improve  1/22/2021 2031 by Yanci Goff R.N.  Outcome: PROGRESSING AS EXPECTED    Problem: Psychosocial Needs:  Goal: Level of anxiety will decrease  Outcome: PROGRESSING AS EXPECTED  Note: Patient able to verbalize her emotions regarding her new diagnosis. States that her emotions fluctuate, and that she has a good support system. Encouraged to voice needs and ask for help as needed.

## 2021-01-23 NOTE — PROGRESS NOTES
Neurosurgery Progress Note    Subjective:  No acute events. Pt sitting up in bed talking on the phone. Very positive attitude. No medical complaints. Vision improved after starting decadron.         Exam:  A&O x3, GCS 15  PERRL, EOMI  Face symm, tongue midline  HOLM with FS, no drift  EVANGELINA, finger-nose unremarkable  Ambulatory without difficulty. Denies dizziness or balance disturbance    BP  Min: 100/64  Max: 122/81  Pulse  Av.6  Min: 88  Max: 113  Resp  Av.8  Min: 16  Max: 18  Temp  Av.3 °C (97.3 °F)  Min: 36 °C (96.8 °F)  Max: 36.9 °C (98.4 °F)  SpO2  Av.2 %  Min: 90 %  Max: 98 %    No data recorded    Recent Labs     21   WBC 10.6   RBC 5.11   HEMOGLOBIN 16.0   HEMATOCRIT 47.8*   MCV 93.5   MCH 31.3   MCHC 33.5*   RDW 42.7   PLATELETCT 406   MPV 10.9     Recent Labs     21   SODIUM 137   POTASSIUM 4.5   CHLORIDE 103   CO2 21   GLUCOSE 239*   BUN 11   CREATININE 0.64   CALCIUM 9.5     Recent Labs     21   APTT 35.9   INR 0.97           Intake/Output       21 0700 - 21 0659 21 0700 - 21 0659      [id number] 2169-8115 Total 5742-0149 4149-2500 Total       Intake    P.O.  640  -- 640  --  -- --    P.O. 640 -- 640 -- -- --    Total Intake 640 -- 640 -- -- --       Output    Urine  --  -- --  --  -- --    Number of Times Voided -- 1 x 1 x -- -- --    Stool  --  -- --  --  -- --    Number of Times Stooled -- 0 x 0 x -- -- --    Total Output -- -- -- -- -- --       Net I/O     640 -- 640 -- -- --            Intake/Output Summary (Last 24 hours) at 2021 0953  Last data filed at 2021 1400  Gross per 24 hour   Intake 400 ml   Output --   Net 400 ml            • dexamethasone  10 mg Q6HRS   • diphenhydrAMINE  25 mg HS PRN   • senna-docusate  2 Tab BID    And   • polyethylene glycol/lytes  1 Packet QDAY PRN    And   • magnesium hydroxide  30 mL QDAY PRN    And   • bisacodyl  10 mg QDAY PRN   • enoxaparin  40 mg DAILY   • acetaminophen   650 mg Q6HRS PRN   • ondansetron  4 mg Q4HRS PRN   • ondansetron  4 mg Q4HRS PRN   • promethazine  12.5-25 mg Q4HRS PRN   • promethazine  12.5-25 mg Q4HRS PRN   • prochlorperazine  5-10 mg Q4HRS PRN   • levETIRAcetam  500 mg BID       Assessment and Plan:  Hospital day #3 large left extraaxial calcified mass c/w meningioma sig mass effect  POD #na  Prophylactic anticoagulation: yes         Start date/time: last dose am 1/24/21    Plan:  Stable neuro  keppra 500 bid  Dec 10Q6  OR next Monday on 1/25/21  NPO after mn on Sunday  Stop Lovenox after Sun am dose.

## 2021-01-23 NOTE — NON-PROVIDER
Medical Student Daily Progress Note     Date of Service  1/23/2021     Chief Complaint  45 y.o. female admitted on 1/21 for worsening headache      Hospital Course  This is a 45-year-old female with chronic migraines since 17yo who presented on 1/21/2021 with severe, worsening headache. Patient states she usually takes tylenol and ibuprofen OTC which resolves symptoms. However, headache continued to persist for 3 days. Reports pain is sharp, located at temporal region bilaterally and radiates to front of her head. Associated with photophobia, double vision, blurry vision, and nausea. Denies fevers, chills, chest pain, shortness of breath, vomiting, weakness, tingling, or numbness. Patient's family history notable for intracranial bleeding in her father who had surgery 6 months ago, and history of aneurysm in aunt.      While in ED, vitals stable, labs unremarkable. CT head showed large calcified left apex meningioma with significant mass effect on left parietal lobe with evidence of vasogenic edema. Given decadron 4 mg, benadryl 25 mg, toradol 30 mg, and metoclopramide 10 mg. Neurosurgery was consulted, recommended MRI of brain, with possible OR next week.      Admitted to hospitalist service.      Interval Problem Update  Afebrile, vitals stable. Saturating well on room air.   Patient reports she is doing well this morning. States headache much improved. Vision changes/blurry vision resolved. Tolerating oral intake, ambulating/out of bed, going to bathroom. Denies fevers, chills, nausea, vomiting, or other focal neurological deficits.      Consultants/Specialty  Neurosurgery     Code Status  Full Code     Disposition  Pending medical clearance     Review of Systems   Review of Systems   Constitutional: Negative for chills and fever.  Respiratory: Negative for cough, sputum production.    Cardiovascular: Negative for chest pain and palpitations.   Gastrointestinal: Negative for abdominal pain, diarrhea,  nausea, vomiting.   Genitourinary: Negative for dysuria, frequency and urgency.   Musculoskeletal: Negative for back pain, myalgias and neck pain.   Skin: Negative for itching.   Neurological: Positive for headaches. Negative for loss of consciousness, dizziness, weakness, tingling, or tremors     Physical Exam  Temp: [36.1 °C - 36.7 °C]  36.4 °C (97.5 °F)  Pulse: [81 - 121] 88  BP: [109 - 145 / 66 - 97] 118 / 70  Resp: 16  SpO2: [94 %  - 97 %] 94 %      Physical Exam  Vitals signs and nursing note reviewed.   Constitutional:       General: She is not in acute distress.     Appearance: Normal appearance, not toxic-appearing.   HENT:      Head: Normocephalic and atraumatic.      Nose: Nose normal.      Mouth/Throat:      Mouth: Mucous membranes are moist.      Pharynx: Oropharynx is clear.   Eyes:     Pupils: equal and reactive to light bilaterally      Extraocular Movements: Extraocular movements intact.   Neck:      Musculoskeletal: Normal range of motion and neck supple. No jugular venous distension.   Cardiovascular:      Rate and Rhythm: regular rate and regular rhythm.      Pulses: Normal pulses.      Heart sounds: Normal heart sounds. No murmur.   Pulmonary:      Effort: No increased work of breathing. No respiratory distress.      Breath sounds: Normal breath sounds.   Chest:      Chest wall: No tenderness.   Abdominal:      General: Bowel sounds are normal. There is no distension.      Palpations: Abdomen is soft. No masses noted.      Tenderness: There is no abdominal tenderness. There is no guarding or rigidity.   Musculoskeletal: Normal range of motion.   Skin:     General: Skin is warm, dry     Capillary refill: < 2 seconds  Neurological:      General: No focal deficit present.      Mental Status: She is alert and oriented to person, place, and time. Mental status is at baseline.      Cranial Nerves: No cranial nerve deficit.      Motor: No weakness.      Sensory: grossly intact   Psychiatric:          Mood and Affect: Mood normal.       Fluids:     Intake/Output Summary (Last 24 hours) at 1/23/2021 1019  Last data filed at 1/22/2021 1400  Gross per 24 hour   Intake 400 ml   Output --   Net 400 ml     Laboratory:     No new labs today    Imaging:     CT-HEAD W/O   Final Result           1.  Large calcified extra-axial mass at the left apex, appearance favors meningioma, there is significant mass effect on the left parietal lobe with surrounding mild vasogenic edema. Recommend follow-up characterization with MRI of the brain without and    with contrast.       These findings were discussed with the patient's clinician, Chang Haq, on 1/21/2021 10:24 PM.      DX- CHEST- PORTABLE  Final Result     1.  Basilar atelectasis versus early infiltrate     MR-BRAIN-WITH & W/O  Final Result     1.  43 x 42 x 44 mm left parietal parafalcine vertex meningioma with involvement of the superior sagittal sinus and the parietal calvarium. There is prominent mass effect on the left parietal lobe with mild vasogenic edema.  2.  7 mm intra-axial lesion in the right frontoparietal region with blooming artifact on GRE and a couple additional tiny foci of blooming artifact in the right temporal lobe and left parietotemporal region. These probably represent small cavernous   malformations however follow-up is suggested or recommend comparison with old outside prior brain MRI studies.  3.  No acute infarct or intracranial hemorrhage.     Assessment/Plan:     Meningioma (HCC)- (present on admission) Hyperglycemia  Assessment & Plan       History of migraines       Presented on 1/21 with severe migraine/headache with changes in vision for 3 days. Negative for other focal neurological deficits.        CT head showed large calcified meningioma with mass effect on L parietal lobe and evidence of vasogenic edema       MRI brain confirmed left parietal parafalcine vertex meningioma with prominent mass effect on L parietal lobe        Dr. Soto, neurosurgery, consulted with plan for OR next Monday on 1/25/2021       - Appreciate neurosurgery recs       - continue decadron 10 mg q6h       - continue Keppra 500 mg BID     Hyperglycemia- (present on admission)   Assessment & Plan        No history of diabetes mellitus        UA showed glucosuria, + trace ketones        Random glc 239 on admission        - follow-up CMP         - ISS as needed     VTE prophylaxis: Lovenox, SCDs

## 2021-01-24 ENCOUNTER — ANESTHESIA EVENT (OUTPATIENT)
Dept: SURGERY | Facility: MEDICAL CENTER | Age: 46
DRG: 025 | End: 2021-01-24
Payer: COMMERCIAL

## 2021-01-24 PROBLEM — D72.829 LEUKOCYTOSIS: Status: ACTIVE | Noted: 2021-01-24

## 2021-01-24 PROBLEM — E11.9 DIABETES MELLITUS (HCC): Status: ACTIVE | Noted: 2021-01-24

## 2021-01-24 LAB
ANION GAP SERPL CALC-SCNC: 15 MMOL/L (ref 7–16)
B-OH-BUTYR SERPL-MCNC: <0.2 MMOL/L (ref 0.02–0.27)
BASOPHILS # BLD AUTO: 0.2 % (ref 0–1.8)
BASOPHILS # BLD: 0.02 K/UL (ref 0–0.12)
BUN SERPL-MCNC: 18 MG/DL (ref 8–22)
CALCIUM SERPL-MCNC: 9.7 MG/DL (ref 8.5–10.5)
CHLORIDE SERPL-SCNC: 98 MMOL/L (ref 96–112)
CO2 SERPL-SCNC: 21 MMOL/L (ref 20–33)
CREAT SERPL-MCNC: 0.65 MG/DL (ref 0.5–1.4)
EOSINOPHIL # BLD AUTO: 0 K/UL (ref 0–0.51)
EOSINOPHIL NFR BLD: 0 % (ref 0–6.9)
ERYTHROCYTE [DISTWIDTH] IN BLOOD BY AUTOMATED COUNT: 41.1 FL (ref 35.9–50)
EST. AVERAGE GLUCOSE BLD GHB EST-MCNC: 232 MG/DL
GLUCOSE BLD-MCNC: 357 MG/DL (ref 65–99)
GLUCOSE BLD-MCNC: 373 MG/DL (ref 65–99)
GLUCOSE BLD-MCNC: 411 MG/DL (ref 65–99)
GLUCOSE BLD-MCNC: 413 MG/DL (ref 65–99)
GLUCOSE BLD-MCNC: 481 MG/DL (ref 65–99)
GLUCOSE SERPL-MCNC: 378 MG/DL (ref 65–99)
HBA1C MFR BLD: 9.7 % (ref 0–5.6)
HCT VFR BLD AUTO: 43.2 % (ref 37–47)
HGB BLD-MCNC: 14.7 G/DL (ref 12–16)
IMM GRANULOCYTES # BLD AUTO: 0.06 K/UL (ref 0–0.11)
IMM GRANULOCYTES NFR BLD AUTO: 0.5 % (ref 0–0.9)
LYMPHOCYTES # BLD AUTO: 1.37 K/UL (ref 1–4.8)
LYMPHOCYTES NFR BLD: 11.9 % (ref 22–41)
MAGNESIUM SERPL-MCNC: 2.3 MG/DL (ref 1.5–2.5)
MCH RBC QN AUTO: 31.5 PG (ref 27–33)
MCHC RBC AUTO-ENTMCNC: 34 G/DL (ref 33.6–35)
MCV RBC AUTO: 92.7 FL (ref 81.4–97.8)
MONOCYTES # BLD AUTO: 0.37 K/UL (ref 0–0.85)
MONOCYTES NFR BLD AUTO: 3.2 % (ref 0–13.4)
NEUTROPHILS # BLD AUTO: 9.68 K/UL (ref 2–7.15)
NEUTROPHILS NFR BLD: 84.2 % (ref 44–72)
NRBC # BLD AUTO: 0 K/UL
NRBC BLD-RTO: 0 /100 WBC
PHOSPHATE SERPL-MCNC: 3.7 MG/DL (ref 2.5–4.5)
PLATELET # BLD AUTO: 371 K/UL (ref 164–446)
PMV BLD AUTO: 10.8 FL (ref 9–12.9)
POTASSIUM SERPL-SCNC: 4 MMOL/L (ref 3.6–5.5)
PROCALCITONIN SERPL-MCNC: <0.05 NG/ML
RBC # BLD AUTO: 4.66 M/UL (ref 4.2–5.4)
SODIUM SERPL-SCNC: 134 MMOL/L (ref 135–145)
WBC # BLD AUTO: 11.5 K/UL (ref 4.8–10.8)

## 2021-01-24 PROCEDURE — 99233 SBSQ HOSP IP/OBS HIGH 50: CPT | Performed by: INTERNAL MEDICINE

## 2021-01-24 PROCEDURE — 84100 ASSAY OF PHOSPHORUS: CPT

## 2021-01-24 PROCEDURE — 700102 HCHG RX REV CODE 250 W/ 637 OVERRIDE(OP): Performed by: STUDENT IN AN ORGANIZED HEALTH CARE EDUCATION/TRAINING PROGRAM

## 2021-01-24 PROCEDURE — 84145 PROCALCITONIN (PCT): CPT

## 2021-01-24 PROCEDURE — 770006 HCHG ROOM/CARE - MED/SURG/GYN SEMI*

## 2021-01-24 PROCEDURE — 700111 HCHG RX REV CODE 636 W/ 250 OVERRIDE (IP): Performed by: NURSE PRACTITIONER

## 2021-01-24 PROCEDURE — 85025 COMPLETE CBC W/AUTO DIFF WBC: CPT

## 2021-01-24 PROCEDURE — 700111 HCHG RX REV CODE 636 W/ 250 OVERRIDE (IP): Performed by: STUDENT IN AN ORGANIZED HEALTH CARE EDUCATION/TRAINING PROGRAM

## 2021-01-24 PROCEDURE — 83036 HEMOGLOBIN GLYCOSYLATED A1C: CPT

## 2021-01-24 PROCEDURE — 82962 GLUCOSE BLOOD TEST: CPT | Mod: 91

## 2021-01-24 PROCEDURE — 80048 BASIC METABOLIC PNL TOTAL CA: CPT

## 2021-01-24 PROCEDURE — 82010 KETONE BODYS QUAN: CPT

## 2021-01-24 PROCEDURE — 83735 ASSAY OF MAGNESIUM: CPT

## 2021-01-24 PROCEDURE — A9270 NON-COVERED ITEM OR SERVICE: HCPCS | Performed by: STUDENT IN AN ORGANIZED HEALTH CARE EDUCATION/TRAINING PROGRAM

## 2021-01-24 RX ORDER — LORAZEPAM 2 MG/ML
1 INJECTION INTRAMUSCULAR ONCE
Status: COMPLETED | OUTPATIENT
Start: 2021-01-24 | End: 2021-01-24

## 2021-01-24 RX ADMIN — DEXAMETHASONE 10 MG: 4 TABLET ORAL at 12:12

## 2021-01-24 RX ADMIN — INSULIN HUMAN 14 UNITS: 100 INJECTION, SOLUTION PARENTERAL at 20:57

## 2021-01-24 RX ADMIN — LEVETIRACETAM 500 MG: 500 TABLET ORAL at 06:26

## 2021-01-24 RX ADMIN — DOCUSATE SODIUM 50 MG AND SENNOSIDES 8.6 MG 2 TABLET: 8.6; 5 TABLET, FILM COATED ORAL at 06:26

## 2021-01-24 RX ADMIN — ACETAMINOPHEN 650 MG: 325 TABLET, FILM COATED ORAL at 20:57

## 2021-01-24 RX ADMIN — DEXAMETHASONE 10 MG: 4 TABLET ORAL at 22:42

## 2021-01-24 RX ADMIN — INSULIN HUMAN 12 UNITS: 100 INJECTION, SOLUTION PARENTERAL at 07:42

## 2021-01-24 RX ADMIN — ENOXAPARIN SODIUM 40 MG: 40 INJECTION SUBCUTANEOUS at 06:26

## 2021-01-24 RX ADMIN — LEVETIRACETAM 500 MG: 500 TABLET ORAL at 17:40

## 2021-01-24 RX ADMIN — DEXAMETHASONE 10 MG: 4 TABLET ORAL at 17:40

## 2021-01-24 RX ADMIN — LORAZEPAM 1 MG: 2 INJECTION INTRAMUSCULAR; INTRAVENOUS at 22:41

## 2021-01-24 RX ADMIN — INSULIN HUMAN 14 UNITS: 100 INJECTION, SOLUTION PARENTERAL at 12:15

## 2021-01-24 RX ADMIN — INSULIN HUMAN 12 UNITS: 100 INJECTION, SOLUTION PARENTERAL at 17:41

## 2021-01-24 RX ADMIN — DEXAMETHASONE 10 MG: 4 TABLET ORAL at 06:26

## 2021-01-24 ASSESSMENT — ENCOUNTER SYMPTOMS
FEVER: 0
DIARRHEA: 0
PALPITATIONS: 0
WEAKNESS: 0
SORE THROAT: 0
FALLS: 0
COUGH: 0
ABDOMINAL PAIN: 0
HEADACHES: 1
DIZZINESS: 0
CHILLS: 0
CONSTIPATION: 0
NAUSEA: 0
VOMITING: 0
DEPRESSION: 0
NERVOUS/ANXIOUS: 0
SHORTNESS OF BREATH: 0
BLURRED VISION: 0

## 2021-01-24 ASSESSMENT — PAIN DESCRIPTION - PAIN TYPE: TYPE: ACUTE PAIN

## 2021-01-24 ASSESSMENT — COGNITIVE AND FUNCTIONAL STATUS - GENERAL
SUGGESTED CMS G CODE MODIFIER MOBILITY: CH
MOBILITY SCORE: 24
SUGGESTED CMS G CODE MODIFIER DAILY ACTIVITY: CH
DAILY ACTIVITIY SCORE: 24

## 2021-01-24 NOTE — PROGRESS NOTES
[facility] Medicine Daily Progress Note    Date of Service  1/24/2021    Chief Complaint  45 y.o. female admitted 1/21/2021 with headache    Hospital Course  Ms. Marni Muñoz is a 45 y.o. female who presented on 1/21/2021 with severe migraine x3 days.  Patient has history of migraines controlled with Tylenol and ibuprofen.  Her current migraine is more persistent and intense.  Initially presented to an urgent care, but was referred to the emergency room.  CT head showed a large calcified extra-axial mass in the left apex consistent with meningioma with significant mass-effect on left parietal lobe with surrounding mild vasogenic edema.  MRI brain shows left parietal meningioma with mass-effect on the parietal lobe with mild vasogenic edema.  Patient was started on Decadron and levetiracetam.  She was followed by neurosurgery with plan for craniectomy.  Patient has had elevated blood sugars which were attributed to Decadron.  A1c of 9.7 is consistent with history of diabetes.  She was started on insulin sliding scale as well as diabetic counseling.       Interval Problem Update  Patient was seen and examined at bedside.  I have personally reviewed vitals, labs, and imaging.    1/22.  Afebrile.  Initially presented with tachycardia which has resolved.  On room air.  Denies fever, chills, chest pains or shortness of breath.  Patient reports headache is improved described as pressure and hot poker feeling.  Visual symptoms have resolved.  Plan for craniectomy Monday 1/23.  Afebrile.  Episodes of tachycardia.  On room air.  Denies fever, chills, chest pains, shortness of breath.  Headaches and vision changes have resolved.  Adequate p.o. intake.  Has had hyperglycemia likely secondary to Decadron.  No acidosis on BMP.  1/24.  Afebrile.  Episode of hypotension this morning.  On room air.  Mild leukocytosis likely secondary to steroids.  Negative procalcitonin.  Hemoglobin A1c 9.7 is consistent with diabetes, elevated  blood sugar not just secondary to steroids.  No acidosis or gap on BMP.  Negative beta hydroxybutyric acid.  Continue sliding scale insulin and encourage p.o. fluid intake.  Denies fever, chills, chest pains, shortness of breath.  Has slight headache.  Says she is missing her cigarettes but declines nicotine patch.    Consultants/Specialty  NSG    Code Status  Full Code    Disposition  Medical clearance    Review of Systems  Review of Systems   Constitutional: Negative for chills and fever.   HENT: Negative for congestion and sore throat.    Eyes: Negative for blurred vision (Resolved).   Respiratory: Negative for cough and shortness of breath.    Cardiovascular: Negative for chest pain, palpitations and leg swelling.   Gastrointestinal: Negative for abdominal pain, constipation, diarrhea, nausea and vomiting.   Genitourinary: Negative for dysuria, frequency and urgency.   Musculoskeletal: Negative for falls.   Skin: Negative for rash.   Neurological: Positive for headaches. Negative for dizziness and weakness.   Psychiatric/Behavioral: Negative for depression. The patient is not nervous/anxious.    All other systems reviewed and are negative.       Physical Exam  Temp:  [36.1 °C (97 °F)-36.6 °C (97.8 °F)] 36.1 °C (97 °F)  Pulse:  [69-94] 69  Resp:  [16-17] 17  BP: ()/(51-86) 94/51  SpO2:  [95 %-97 %] 95 %    Physical Exam  Vitals signs and nursing note reviewed.   Constitutional:       General: She is not in acute distress.     Appearance: Normal appearance. She is obese.   HENT:      Head: Normocephalic and atraumatic.      Nose: Nose normal.      Mouth/Throat:      Mouth: Mucous membranes are moist.      Pharynx: Oropharynx is clear. No oropharyngeal exudate or posterior oropharyngeal erythema.   Eyes:      Extraocular Movements: Extraocular movements intact.      Conjunctiva/sclera: Conjunctivae normal.   Neck:      Musculoskeletal: Normal range of motion and neck supple.   Cardiovascular:      Rate and  Rhythm: Normal rate and regular rhythm.      Pulses: Normal pulses.      Heart sounds: Normal heart sounds. No murmur.   Pulmonary:      Effort: Pulmonary effort is normal. No respiratory distress.      Breath sounds: Normal breath sounds. No stridor. No wheezing or rales.   Abdominal:      General: Abdomen is flat. Bowel sounds are normal. There is no distension.      Palpations: Abdomen is soft. There is no mass.      Tenderness: There is no abdominal tenderness.   Skin:     General: Skin is warm.      Capillary Refill: Capillary refill takes less than 2 seconds.   Neurological:      General: No focal deficit present.      Mental Status: She is alert and oriented to person, place, and time. Mental status is at baseline.      Cranial Nerves: No cranial nerve deficit.      Motor: No weakness.   Psychiatric:         Mood and Affect: Mood normal.         Behavior: Behavior normal.         Fluids  No intake or output data in the 24 hours ending 01/24/21 1002    Laboratory  Recent Labs     01/21/21 2117 01/23/21  1107 01/24/21  0325   WBC 10.6 15.6* 11.5*   RBC 5.11 4.85 4.66   HEMOGLOBIN 16.0 15.0 14.7   HEMATOCRIT 47.8* 44.8 43.2   MCV 93.5 92.4 92.7   MCH 31.3 30.9 31.5   MCHC 33.5* 33.5* 34.0   RDW 42.7 41.3 41.1   PLATELETCT 406 397 371   MPV 10.9 10.4 10.8     Recent Labs     01/21/21 2117 01/23/21  1107 01/24/21  0325   SODIUM 137 132* 134*   POTASSIUM 4.5 4.8 4.0   CHLORIDE 103 96 98   CO2 21 22 21   GLUCOSE 239* 464* 378*   BUN 11 19 18   CREATININE 0.64 0.81 0.65   CALCIUM 9.5 9.9 9.7     Recent Labs     01/21/21 2117   APTT 35.9   INR 0.97               Imaging  MR-BRAIN-WITH & W/O   Final Result      1.  43 x 42 x 44 mm left parietal parafalcine vertex meningioma with involvement of the superior sagittal sinus and the parietal calvarium. There is prominent mass effect on the left parietal lobe with mild vasogenic edema.   2.  7 mm intra-axial lesion in the right frontoparietal region with blooming  artifact on GRE and a couple additional tiny foci of blooming artifact in the right temporal lobe and left parietotemporal region. These probably represent small cavernous    malformations however follow-up is suggested or recommend comparison with old outside prior brain MRI studies.   3.  No acute infarct or intracranial hemorrhage.      DX-CHEST-PORTABLE (1 VIEW)   Final Result         1.  Basilar atelectasis versus early infiltrate      CT-HEAD W/O   Final Result         1.  Large calcified extra-axial mass at the left apex, appearance favors meningioma, there is significant mass effect on the left parietal lobe with surrounding mild vasogenic edema. Recommend follow-up characterization with MRI of the brain without and    with contrast.      These findings were discussed with the patient's clinician, Chang Haq, on 1/21/2021 10:24 PM.           Assessment/Plan  * Meningioma (HCC)- (present on admission)  Assessment & Plan  Severe migraine w/ vision changes x3 days  History of migraines since 16years old  No other focal neuro deficits noted  CT head w/ large calcified meningioma w/ mass effect and vasogenic edema  S/p Decadron 4mg, Toradol 30mg, and metoclopramide 10mg  - will initiate Keppra 500mg BID  - Decadron 10mg q6h  - symptomatic management  - MRI brain with left parietal meningioma  - Neurosurgery (Dr. Soto) consulted surgery Monday 1/25    Leukocytosis  Assessment & Plan  Trending down.  Patient is afebrile.  Does not endorse any symptoms concerning for infection  Negative procalcitonin.  Likely secondary to steroids.    Elevated blood sugar  Assessment & Plan  A1c 9.7 is diagnostic of diabetes.  Also on high-dose of Decadron.  No acidosis on BMP  Beta hydroxybutyric acid negative  Start insulin sliding scale  Encourage fluid intake    Lab Results   Component Value Date/Time    HBA1C 9.7 (H) 01/24/2021 0325     Results from last 7 days   Lab Units 01/24/21  1214 01/24/21  0739 01/23/21  5573  01/23/21  2141 01/23/21  1635   ACCU CHECK GLUCOSE 788 mg/dL 411* 357* 413* 540* 559*     Diabetic diet  Diabetic education  Hopeful that once patient can be tapered off steroids she can be discharged on Metformin versus other oral hypoglycemics       VTE prophylaxis: Enoxaparin.  Hold for planned surgery tomorrow.

## 2021-01-24 NOTE — PROGRESS NOTES
Neurosurgery Progress Note    Subjective:  No acute events.    Vision improved after starting decadron.   Denies pain, N/V, headaches    Exam:  A&O x3, GCS 15  PERRL, EOMI  Face symm, tongue midline  HOLM with FS, no drift  EVANGELINA, finger-nose unremarkable  Ambulatory without difficulty. Denies dizziness or balance disturbance    BP  Min: 94/51  Max: 127/86  Pulse  Av.3  Min: 69  Max: 94  Resp  Av.3  Min: 16  Max: 17  Temp  Av.3 °C (97.4 °F)  Min: 36.1 °C (97 °F)  Max: 36.6 °C (97.8 °F)  SpO2  Av.7 %  Min: 95 %  Max: 97 %    No data recorded    Recent Labs     21  0325   WBC 10.6 15.6* 11.5*   RBC 5.11 4.85 4.66   HEMOGLOBIN 16.0 15.0 14.7   HEMATOCRIT 47.8* 44.8 43.2   MCV 93.5 92.4 92.7   MCH 31.3 30.9 31.5   MCHC 33.5* 33.5* 34.0   RDW 42.7 41.3 41.1   PLATELETCT 406 397 371   MPV 10.9 10.4 10.8     Recent Labs     217 21  0325   SODIUM 137 132* 134*   POTASSIUM 4.5 4.8 4.0   CHLORIDE 103 96 98   CO2 21 22 21   GLUCOSE 239* 464* 378*   BUN 11 19 18   CREATININE 0.64 0.81 0.65   CALCIUM 9.5 9.9 9.7     Recent Labs     21   APTT 35.9   INR 0.97           Intake/Output       21 - 21 0659 21 07 - 21 0659       Total  Total       Intake    Total Intake -- -- -- -- -- --       Output    Urine  --  -- --  --  -- --    Number of Times Voided 2 x -- 2 x -- -- --    Total Output -- -- -- -- -- --       Net I/O     -- -- -- -- -- --          No intake or output data in the 24 hours ending 21 1015         • insulin regular  3-14 Units 4X/DAY ACHS    And   • glucose  16 g Q15 MIN PRN    And   • dextrose 50%  50 mL Q15 MIN PRN   • dexamethasone  10 mg Q6HRS   • diphenhydrAMINE  25 mg HS PRN   • senna-docusate  2 Tab BID    And   • polyethylene glycol/lytes  1 Packet QDAY PRN    And   • magnesium hydroxide  30 mL QDAY PRN    And   • bisacodyl  10 mg QDAY PRN    • acetaminophen  650 mg Q6HRS PRN   • ondansetron  4 mg Q4HRS PRN   • ondansetron  4 mg Q4HRS PRN   • promethazine  12.5-25 mg Q4HRS PRN   • promethazine  12.5-25 mg Q4HRS PRN   • prochlorperazine  5-10 mg Q4HRS PRN   • levETIRAcetam  500 mg BID       Assessment and Plan:  Hospital day #4 large left extraaxial calcified mass c/w meningioma sig mass effect  POD #na  Prophylactic anticoagulation: hold         Start date/time: last dose am 1/24/21    Plan:  Stable neuro  keppra 500 bid  Dec 10Q6  OR next Monday on 1/25/21  NPO after mn on Sunday  Stop Lovenox after am dose today

## 2021-01-24 NOTE — ASSESSMENT & PLAN NOTE
A1c 9.7 is diagnostic of diabetes.  Also on high-dose of Decadron.  No acidosis on BMP  Beta hydroxybutyric acid negative  Start insulin sliding scale  Encourage fluid intake    Lab Results   Component Value Date/Time    HBA1C 9.7 (H) 01/24/2021 0325     Results from last 7 days   Lab Units 01/25/21  1049 01/25/21  0813 01/24/21  2056 01/24/21  1720 01/24/21  1214 01/24/21  0739 01/23/21  2339 01/23/21  2141   ACCU CHECK GLUCOSE 788 mg/dL 437* 452* 481* 373* 411* 357* 413* 540*     Diabetic diet  Diabetic education  Hopeful that once patient can be tapered off steroids she can be discharged on Metformin versus other oral hypoglycemics

## 2021-01-24 NOTE — PROGRESS NOTES
2200 Paged on call hosp to notify of FSBS. Orders received for 5 units insulin IV push and follow up in 1 hour with fsbs. Hold sliding scale insulin.    2330 On call hosp notified of fsbs. Orders received to administer insulin per sliding scale protocol now and resume per order.

## 2021-01-24 NOTE — NON-PROVIDER
Medical Student Daily Progress Note     Date of Service  1/24/2021     Chief Complaint  45 y.o. female admitted on 1/21 for worsening headache      Hospital Course  This is a 45-year-old female with chronic migraines since 17yo who presented on 1/21/2021 with severe, worsening headache. Patient states she usually takes tylenol and ibuprofen OTC which resolves symptoms. However, headache continued to persist for 3 days. Reports pain is sharp, located at temporal region bilaterally and radiates to front of her head. Associated with photophobia, double vision, blurry vision, and nausea. Denies fevers, chills, chest pain, shortness of breath, vomiting, weakness, tingling, or numbness. Patient's family history notable for intracranial bleeding in her father who had surgery 6 months ago, and history of aneurysm in aunt.      While in ED, vitals stable, labs unremarkable. CT head showed large calcified left apex meningioma with significant mass effect on left parietal lobe with evidence of vasogenic edema. Given decadron 4 mg, benadryl 25 mg, toradol 30 mg, and metoclopramide 10 mg. Neurosurgery was consulted, recommended MRI of brain, with possible OR next week.      Admitted to hospitalist service.      Interval Problem Update  Afebrile, vitals stable. Saturating well on room air.     Patient reports she is doing well this morning. No changes since yesterday. However, had difficulty sleeping throughout the night due to neighboring patient. Tolerating oral intake, ambulating/out of bed, going to bathroom. Denies fevers, chills, nausea, vomiting, or other focal neurological deficits.      Consultants/Specialty  Neurosurgery     Code Status  Full Code     Disposition  Pending medical clearance     Review of Systems   Review of Systems   Constitutional: Negative for chills and fever. Negative for blurry vision.   Respiratory: Negative for cough, sputum production.    Cardiovascular: Negative for chest pain and palpitations.    Gastrointestinal: Negative for abdominal pain, diarrhea, nausea, vomiting.   Genitourinary: Negative for dysuria, frequency and urgency.   Musculoskeletal: Negative for back pain, myalgias and neck pain.   Skin: Negative for itching.   Neurological: Positive for headaches. Negative for loss of consciousness, dizziness, weakness, tingling, or tremors     Physical Exam  Temp: [36.1 °C - 36.6 °C]  36.1 °C (97.0 °F)  Pulse: [69 - 94] 69  BP: [94 - 127 / 51 - 86] 94 / 51  Resp: [16 - 17] 17  SpO2: [90 %  - 97 %] 95 %      Physical Exam  Vitals signs and nursing note reviewed.   Constitutional:       General: She is not in acute distress.     Appearance: Normal appearance, not toxic-appearing.   HENT:      Head: Normocephalic and atraumatic.      Nose: Nose normal.      Mouth/Throat:      Mouth: Mucous membranes are moist.      Pharynx: Oropharynx is clear.   Eyes:     Pupils: equal and reactive to light bilaterally      Extraocular Movements: Extraocular movements intact.   Neck:      Musculoskeletal: Normal range of motion and neck supple. No jugular venous distension.   Cardiovascular:      Rate and Rhythm: regular rate and regular rhythm.      Pulses: Normal pulses.      Heart sounds: Normal heart sounds. No murmur.   Pulmonary:      Effort: No increased work of breathing. No respiratory distress.      Breath sounds: Normal breath sounds.   Chest:      Chest wall: No tenderness.   Abdominal:      General: Bowel sounds are normal. There is no distension.      Palpations: Abdomen is soft. No masses noted.      Tenderness: There is no abdominal tenderness. There is no guarding or rigidity.   Musculoskeletal: Normal range of motion.   Skin:     General: Skin is warm, dry     Capillary refill: < 2 seconds  Neurological:      General: No focal deficit present.      Mental Status: She is alert and oriented to person, place, and time. Mental status is at baseline.      Cranial Nerves: No cranial nerve deficit.      Motor:  No weakness.      Sensory: grossly intact   Psychiatric:         Mood and Affect: Mood normal.       Fluids:   No intake or output data in the 24 hours ending 01/24/21 0852     Laboratory:      1/23/2021 11:07 1/24/2021 03:25   WBC 15.6 (H) 11.5 (H)   RBC 4.85 4.66   Hemoglobin 15.0 14.7   Hematocrit 44.8 43.2   MCV 92.4 92.7   MCH 30.9 31.5   MCHC 33.5 (L) 34.0   RDW 41.3 41.1   Platelet Count 397 371   MPV 10.4 10.8      1/23/2021 11:07 1/24/2021 03:25   Sodium 132 (L) 134 (L)   Potassium 4.8 4.0   Chloride 96 98   Co2 22 21   Anion Gap 14.0 15.0   Glucose 464 (H) 378 (H)   Bun 19 18     Imaging:     CT-HEAD W/O   Final Result           1.  Large calcified extra-axial mass at the left apex, appearance favors meningioma, there is significant mass effect on the left parietal lobe with surrounding mild vasogenic edema. Recommend follow-up characterization with MRI of the brain without and    with contrast.       These findings were discussed with the patient's clinician, Chang Haq, on 1/21/2021 10:24 PM.      DX- CHEST- PORTABLE  Final Result     1.  Basilar atelectasis versus early infiltrate     MR-BRAIN-WITH & W/O  Final Result     1.  43 x 42 x 44 mm left parietal parafalcine vertex meningioma with involvement of the superior sagittal sinus and the parietal calvarium. There is prominent mass effect on the left parietal lobe with mild vasogenic edema.  2.  7 mm intra-axial lesion in the right frontoparietal region with blooming artifact on GRE and a couple additional tiny foci of blooming artifact in the right temporal lobe and left parietotemporal region. These probably represent small cavernous   malformations however follow-up is suggested or recommend comparison with old outside prior brain MRI studies.  3.  No acute infarct or intracranial hemorrhage.     Assessment/Plan:     Meningioma (HCC)- (present on admission) Hyperglycemia  Assessment & Plan       History of migraines       Presented on 1/21 with  severe migraine/headache with changes in vision for 3 days. Negative for other focal neurological deficits.        CT head showed large calcified meningioma with mass effect on L parietal lobe and evidence of vasogenic edema       MRI brain confirmed left parietal parafalcine vertex meningioma with prominent mass effect on L parietal lobe       Dr. Soto, neurosurgery, consulted with plan for OR next Monday on 1/25/2021       - Appreciate neurosurgery recs       - continue decadron 10 mg q6h       - continue Keppra 500 mg BID     Hyperglycemia- (present on admission)  Assessment & Plan        No history of diabetes mellitus, likely secondary to decadron use        UA showed glucosuria, + trace ketones        Random glc 239 on admission        A1c 9.7          1/23 glc of 464, given 5 units IV insulin        1/24 trending down glc        - Accu-checks        - ISS        - hypoglycemic protocol     Leukocytosis   Assessment & Plan        Likely reactive versus infectious due to decadron use        1/24 WBC trending down, afebrile         - continue to trend WBC         - monitor for signs of infection      VTE prophylaxis: Lovenox, SCDs

## 2021-01-24 NOTE — PROGRESS NOTES
Pt. A&Ox4, VALENCIA TAPIA. Pt. Glucose this morning was 464, MD notified and insulin orders places for meals. Dinner glucose check came to 556. 14 units of ordered insulin given. Pt. Informed of effects of steroids of glucose levels. Will continue to monitor.

## 2021-01-25 ENCOUNTER — APPOINTMENT (OUTPATIENT)
Dept: RADIOLOGY | Facility: MEDICAL CENTER | Age: 46
DRG: 025 | End: 2021-01-25
Attending: NEUROLOGICAL SURGERY
Payer: COMMERCIAL

## 2021-01-25 ENCOUNTER — APPOINTMENT (OUTPATIENT)
Dept: RADIOLOGY | Facility: MEDICAL CENTER | Age: 46
DRG: 025 | End: 2021-01-25
Attending: INTERNAL MEDICINE
Payer: COMMERCIAL

## 2021-01-25 ENCOUNTER — ANESTHESIA (OUTPATIENT)
Dept: SURGERY | Facility: MEDICAL CENTER | Age: 46
DRG: 025 | End: 2021-01-25
Payer: COMMERCIAL

## 2021-01-25 ENCOUNTER — APPOINTMENT (OUTPATIENT)
Dept: CARDIOLOGY | Facility: MEDICAL CENTER | Age: 46
DRG: 025 | End: 2021-01-25
Attending: INTERNAL MEDICINE
Payer: COMMERCIAL

## 2021-01-25 PROBLEM — J98.11 ATELECTASIS: Status: ACTIVE | Noted: 2021-01-25

## 2021-01-25 PROBLEM — E66.811 OBESITY (BMI 30.0-34.9): Status: ACTIVE | Noted: 2021-01-25

## 2021-01-25 PROBLEM — R09.02 HYPOXIA: Status: ACTIVE | Noted: 2021-01-25

## 2021-01-25 PROBLEM — E66.9 OBESITY (BMI 30.0-34.9): Status: ACTIVE | Noted: 2021-01-25

## 2021-01-25 LAB
ACTION RANGE TRIGGERED IACRT: NO
BASE EXCESS BLDA CALC-SCNC: -3 MMOL/L (ref -4–3)
BODY TEMPERATURE: ABNORMAL DEGREES
CA-I BLD ISE-SCNC: 1.25 MMOL/L (ref 1.1–1.3)
CO2 BLDA-SCNC: 25 MMOL/L (ref 20–33)
GLUCOSE BLD-MCNC: 387 MG/DL (ref 65–99)
GLUCOSE BLD-MCNC: 414 MG/DL (ref 65–99)
GLUCOSE BLD-MCNC: 437 MG/DL (ref 65–99)
GLUCOSE BLD-MCNC: 452 MG/DL (ref 65–99)
HCO3 BLDA-SCNC: 23.4 MMOL/L (ref 17–25)
HCT VFR BLD CALC: 41 % (ref 37–47)
HGB BLD-MCNC: 13.9 G/DL (ref 12–16)
HOROWITZ INDEX BLDA+IHG-RTO: 57 MM[HG]
INST. QUALIFIED PATIENT IIQPT: YES
LV EJECT FRACT  99904: 60
LV EJECT FRACT MOD 2C 99903: 62.16
LV EJECT FRACT MOD 4C 99902: 53.59
LV EJECT FRACT MOD BP 99901: 58.4
O2/TOTAL GAS SETTING VFR VENT: 100 %
PCO2 BLDA: 45.5 MMHG (ref 26–37)
PCO2 TEMP ADJ BLDA: 43.3 MMHG (ref 26–37)
PH BLDA: 7.32 [PH] (ref 7.4–7.5)
PH TEMP ADJ BLDA: 7.33 [PH] (ref 7.4–7.5)
PO2 BLDA: 57 MMHG (ref 64–87)
PO2 TEMP ADJ BLDA: 52 MMHG (ref 64–87)
POTASSIUM BLD-SCNC: 4 MMOL/L (ref 3.6–5.5)
SAO2 % BLDA: 86 % (ref 93–99)
SODIUM BLD-SCNC: 134 MMOL/L (ref 135–145)
SPECIMEN DRAWN FROM PATIENT: ABNORMAL

## 2021-01-25 PROCEDURE — 160009 HCHG ANES TIME/MIN: Performed by: NEUROLOGICAL SURGERY

## 2021-01-25 PROCEDURE — 71045 X-RAY EXAM CHEST 1 VIEW: CPT

## 2021-01-25 PROCEDURE — 700102 HCHG RX REV CODE 250 W/ 637 OVERRIDE(OP): Performed by: STUDENT IN AN ORGANIZED HEALTH CARE EDUCATION/TRAINING PROGRAM

## 2021-01-25 PROCEDURE — 770022 HCHG ROOM/CARE - ICU (200)

## 2021-01-25 PROCEDURE — 93306 TTE W/DOPPLER COMPLETE: CPT

## 2021-01-25 PROCEDURE — 85014 HEMATOCRIT: CPT

## 2021-01-25 PROCEDURE — 160031 HCHG SURGERY MINUTES - 1ST 30 MINS LEVEL 5: Performed by: NEUROLOGICAL SURGERY

## 2021-01-25 PROCEDURE — 700102 HCHG RX REV CODE 250 W/ 637 OVERRIDE(OP): Performed by: INTERNAL MEDICINE

## 2021-01-25 PROCEDURE — 500889 HCHG PACK, NEURO: Performed by: NEUROLOGICAL SURGERY

## 2021-01-25 PROCEDURE — 501838 HCHG SUTURE GENERAL: Performed by: NEUROLOGICAL SURGERY

## 2021-01-25 PROCEDURE — 160002 HCHG RECOVERY MINUTES (STAT): Performed by: NEUROLOGICAL SURGERY

## 2021-01-25 PROCEDURE — 82962 GLUCOSE BLOOD TEST: CPT | Mod: 91

## 2021-01-25 PROCEDURE — A9270 NON-COVERED ITEM OR SERVICE: HCPCS | Performed by: STUDENT IN AN ORGANIZED HEALTH CARE EDUCATION/TRAINING PROGRAM

## 2021-01-25 PROCEDURE — 700101 HCHG RX REV CODE 250: Performed by: ANESTHESIOLOGY

## 2021-01-25 PROCEDURE — 160048 HCHG OR STATISTICAL LEVEL 1-5: Performed by: NEUROLOGICAL SURGERY

## 2021-01-25 PROCEDURE — A9270 NON-COVERED ITEM OR SERVICE: HCPCS | Performed by: ANESTHESIOLOGY

## 2021-01-25 PROCEDURE — 84132 ASSAY OF SERUM POTASSIUM: CPT

## 2021-01-25 PROCEDURE — 82330 ASSAY OF CALCIUM: CPT

## 2021-01-25 PROCEDURE — 93306 TTE W/DOPPLER COMPLETE: CPT | Mod: 26 | Performed by: INTERNAL MEDICINE

## 2021-01-25 PROCEDURE — 700102 HCHG RX REV CODE 250 W/ 637 OVERRIDE(OP): Performed by: ANESTHESIOLOGY

## 2021-01-25 PROCEDURE — 500075 HCHG BLADE, CLIPPER NEURO: Performed by: NEUROLOGICAL SURGERY

## 2021-01-25 PROCEDURE — 84295 ASSAY OF SERUM SODIUM: CPT

## 2021-01-25 PROCEDURE — 99223 1ST HOSP IP/OBS HIGH 75: CPT | Performed by: INTERNAL MEDICINE

## 2021-01-25 PROCEDURE — 160035 HCHG PACU - 1ST 60 MINS PHASE I: Performed by: NEUROLOGICAL SURGERY

## 2021-01-25 PROCEDURE — 700105 HCHG RX REV CODE 258: Performed by: ANESTHESIOLOGY

## 2021-01-25 PROCEDURE — 700111 HCHG RX REV CODE 636 W/ 250 OVERRIDE (IP): Performed by: ANESTHESIOLOGY

## 2021-01-25 PROCEDURE — 99233 SBSQ HOSP IP/OBS HIGH 50: CPT | Performed by: INTERNAL MEDICINE

## 2021-01-25 PROCEDURE — 110454 HCHG SHELL REV 250: Performed by: NEUROLOGICAL SURGERY

## 2021-01-25 PROCEDURE — 160042 HCHG SURGERY MINUTES - EA ADDL 1 MIN LEVEL 5: Performed by: NEUROLOGICAL SURGERY

## 2021-01-25 PROCEDURE — 82803 BLOOD GASES ANY COMBINATION: CPT

## 2021-01-25 PROCEDURE — 160036 HCHG PACU - EA ADDL 30 MINS PHASE I: Performed by: NEUROLOGICAL SURGERY

## 2021-01-25 RX ORDER — INSULIN GLARGINE 100 [IU]/ML
10 INJECTION, SOLUTION SUBCUTANEOUS EVERY EVENING
Status: DISCONTINUED | OUTPATIENT
Start: 2021-01-25 | End: 2021-01-26

## 2021-01-25 RX ORDER — DIPHENHYDRAMINE HYDROCHLORIDE 50 MG/ML
12.5 INJECTION INTRAMUSCULAR; INTRAVENOUS
Status: DISCONTINUED | OUTPATIENT
Start: 2021-01-25 | End: 2021-01-25 | Stop reason: HOSPADM

## 2021-01-25 RX ORDER — HYDROMORPHONE HYDROCHLORIDE 1 MG/ML
0.4 INJECTION, SOLUTION INTRAMUSCULAR; INTRAVENOUS; SUBCUTANEOUS
Status: DISCONTINUED | OUTPATIENT
Start: 2021-01-25 | End: 2021-01-25 | Stop reason: HOSPADM

## 2021-01-25 RX ORDER — OXYCODONE HCL 5 MG/5 ML
5 SOLUTION, ORAL ORAL
Status: DISCONTINUED | OUTPATIENT
Start: 2021-01-25 | End: 2021-01-25 | Stop reason: HOSPADM

## 2021-01-25 RX ORDER — ONDANSETRON 2 MG/ML
4 INJECTION INTRAMUSCULAR; INTRAVENOUS
Status: DISCONTINUED | OUTPATIENT
Start: 2021-01-25 | End: 2021-01-25 | Stop reason: HOSPADM

## 2021-01-25 RX ORDER — HYDRALAZINE HYDROCHLORIDE 20 MG/ML
5 INJECTION INTRAMUSCULAR; INTRAVENOUS
Status: DISCONTINUED | OUTPATIENT
Start: 2021-01-25 | End: 2021-01-25 | Stop reason: HOSPADM

## 2021-01-25 RX ORDER — DEXTROSE MONOHYDRATE 25 G/50ML
50 INJECTION, SOLUTION INTRAVENOUS
Status: DISCONTINUED | OUTPATIENT
Start: 2021-01-25 | End: 2021-01-25 | Stop reason: HOSPADM

## 2021-01-25 RX ORDER — SODIUM CHLORIDE, SODIUM GLUCONATE, SODIUM ACETATE, POTASSIUM CHLORIDE AND MAGNESIUM CHLORIDE 526; 502; 368; 37; 30 MG/100ML; MG/100ML; MG/100ML; MG/100ML; MG/100ML
INJECTION, SOLUTION INTRAVENOUS
Status: DISCONTINUED | OUTPATIENT
Start: 2021-01-25 | End: 2021-01-25

## 2021-01-25 RX ORDER — MIDAZOLAM HYDROCHLORIDE 1 MG/ML
INJECTION INTRAMUSCULAR; INTRAVENOUS PRN
Status: DISCONTINUED | OUTPATIENT
Start: 2021-01-25 | End: 2021-01-25 | Stop reason: SURG

## 2021-01-25 RX ORDER — SODIUM CHLORIDE, SODIUM GLUCONATE, SODIUM ACETATE, POTASSIUM CHLORIDE AND MAGNESIUM CHLORIDE 526; 502; 368; 37; 30 MG/100ML; MG/100ML; MG/100ML; MG/100ML; MG/100ML
1000 INJECTION, SOLUTION INTRAVENOUS CONTINUOUS
Status: DISCONTINUED | OUTPATIENT
Start: 2021-01-25 | End: 2021-01-25 | Stop reason: HOSPADM

## 2021-01-25 RX ORDER — OXYCODONE HCL 5 MG/5 ML
10 SOLUTION, ORAL ORAL
Status: DISCONTINUED | OUTPATIENT
Start: 2021-01-25 | End: 2021-01-25 | Stop reason: HOSPADM

## 2021-01-25 RX ORDER — ONDANSETRON 2 MG/ML
INJECTION INTRAMUSCULAR; INTRAVENOUS PRN
Status: DISCONTINUED | OUTPATIENT
Start: 2021-01-25 | End: 2021-01-25 | Stop reason: SURG

## 2021-01-25 RX ORDER — LIDOCAINE HYDROCHLORIDE 20 MG/ML
INJECTION, SOLUTION EPIDURAL; INFILTRATION; INTRACAUDAL; PERINEURAL PRN
Status: DISCONTINUED | OUTPATIENT
Start: 2021-01-25 | End: 2021-01-25 | Stop reason: SURG

## 2021-01-25 RX ORDER — HYDROMORPHONE HYDROCHLORIDE 1 MG/ML
0.2 INJECTION, SOLUTION INTRAMUSCULAR; INTRAVENOUS; SUBCUTANEOUS
Status: DISCONTINUED | OUTPATIENT
Start: 2021-01-25 | End: 2021-01-25 | Stop reason: HOSPADM

## 2021-01-25 RX ORDER — HYDROMORPHONE HYDROCHLORIDE 1 MG/ML
0.1 INJECTION, SOLUTION INTRAMUSCULAR; INTRAVENOUS; SUBCUTANEOUS
Status: DISCONTINUED | OUTPATIENT
Start: 2021-01-25 | End: 2021-01-25 | Stop reason: HOSPADM

## 2021-01-25 RX ORDER — ROCURONIUM BROMIDE 10 MG/ML
INJECTION, SOLUTION INTRAVENOUS PRN
Status: DISCONTINUED | OUTPATIENT
Start: 2021-01-25 | End: 2021-01-25 | Stop reason: SURG

## 2021-01-25 RX ORDER — DEXAMETHASONE SODIUM PHOSPHATE 4 MG/ML
INJECTION, SOLUTION INTRA-ARTICULAR; INTRALESIONAL; INTRAMUSCULAR; INTRAVENOUS; SOFT TISSUE PRN
Status: DISCONTINUED | OUTPATIENT
Start: 2021-01-25 | End: 2021-01-25 | Stop reason: SURG

## 2021-01-25 RX ORDER — CEFAZOLIN SODIUM 1 G/3ML
INJECTION, POWDER, FOR SOLUTION INTRAMUSCULAR; INTRAVENOUS PRN
Status: DISCONTINUED | OUTPATIENT
Start: 2021-01-25 | End: 2021-01-25 | Stop reason: SURG

## 2021-01-25 RX ORDER — LEVETIRACETAM 10 MG/ML
1000 INJECTION INTRAVASCULAR
Status: DISCONTINUED | OUTPATIENT
Start: 2021-01-25 | End: 2021-01-26

## 2021-01-25 RX ADMIN — DOCUSATE SODIUM 50 MG AND SENNOSIDES 8.6 MG 2 TABLET: 8.6; 5 TABLET, FILM COATED ORAL at 17:18

## 2021-01-25 RX ADMIN — SUGAMMADEX 200 MG: 100 INJECTION, SOLUTION INTRAVENOUS at 10:18

## 2021-01-25 RX ADMIN — LIDOCAINE HYDROCHLORIDE 100 MG: 20 INJECTION, SOLUTION EPIDURAL; INFILTRATION; INTRACAUDAL at 09:40

## 2021-01-25 RX ADMIN — INSULIN HUMAN 14 UNITS: 100 INJECTION, SOLUTION PARENTERAL at 21:07

## 2021-01-25 RX ADMIN — DOCUSATE SODIUM 50 MG AND SENNOSIDES 8.6 MG 2 TABLET: 8.6; 5 TABLET, FILM COATED ORAL at 05:59

## 2021-01-25 RX ADMIN — FENTANYL CITRATE 100 MCG: 50 INJECTION, SOLUTION INTRAMUSCULAR; INTRAVENOUS at 09:40

## 2021-01-25 RX ADMIN — MIDAZOLAM HYDROCHLORIDE 2 MG: 1 INJECTION, SOLUTION INTRAMUSCULAR; INTRAVENOUS at 09:32

## 2021-01-25 RX ADMIN — INSULIN HUMAN 9 UNITS: 100 INJECTION, SOLUTION PARENTERAL at 11:07

## 2021-01-25 RX ADMIN — DIPHENHYDRAMINE HYDROCHLORIDE 25 MG: 25 TABLET ORAL at 21:06

## 2021-01-25 RX ADMIN — DEXAMETHASONE 10 MG: 4 TABLET ORAL at 23:52

## 2021-01-25 RX ADMIN — INSULIN HUMAN 14 UNITS: 100 INJECTION, SOLUTION PARENTERAL at 08:18

## 2021-01-25 RX ADMIN — ROCURONIUM BROMIDE 50 MG: 10 INJECTION, SOLUTION INTRAVENOUS at 09:40

## 2021-01-25 RX ADMIN — CEFAZOLIN 2 G: 330 INJECTION, POWDER, FOR SOLUTION INTRAMUSCULAR; INTRAVENOUS at 09:40

## 2021-01-25 RX ADMIN — PROPOFOL 200 MG: 10 INJECTION, EMULSION INTRAVENOUS at 09:40

## 2021-01-25 RX ADMIN — INSULIN GLARGINE 10 UNITS: 100 INJECTION, SOLUTION SUBCUTANEOUS at 17:19

## 2021-01-25 RX ADMIN — MINERAL OIL, PETROLATUM 1 APPLICATION: 425; 573 OINTMENT OPHTHALMIC at 09:40

## 2021-01-25 RX ADMIN — SODIUM CHLORIDE, SODIUM GLUCONATE, SODIUM ACETATE, POTASSIUM CHLORIDE AND MAGNESIUM CHLORIDE: 526; 502; 368; 37; 30 INJECTION, SOLUTION INTRAVENOUS at 09:32

## 2021-01-25 RX ADMIN — DEXAMETHASONE 10 MG: 4 TABLET ORAL at 17:18

## 2021-01-25 RX ADMIN — ONDANSETRON 4 MG: 2 INJECTION INTRAMUSCULAR; INTRAVENOUS at 09:51

## 2021-01-25 RX ADMIN — INSULIN HUMAN 12 UNITS: 100 INJECTION, SOLUTION PARENTERAL at 17:19

## 2021-01-25 RX ADMIN — LEVETIRACETAM 500 MG: 500 TABLET ORAL at 05:59

## 2021-01-25 RX ADMIN — DEXAMETHASONE 10 MG: 4 TABLET ORAL at 05:59

## 2021-01-25 RX ADMIN — DEXAMETHASONE SODIUM PHOSPHATE 10 MG: 4 INJECTION, SOLUTION INTRA-ARTICULAR; INTRALESIONAL; INTRAMUSCULAR; INTRAVENOUS; SOFT TISSUE at 09:51

## 2021-01-25 RX ADMIN — LEVETIRACETAM 500 MG: 500 TABLET ORAL at 17:18

## 2021-01-25 ASSESSMENT — ENCOUNTER SYMPTOMS
CONSTIPATION: 0
WHEEZING: 0
VOMITING: 0
CHILLS: 0
WEAKNESS: 0
SORE THROAT: 0
HEADACHES: 1
SPEECH CHANGE: 0
HEARTBURN: 0
NAUSEA: 0
POLYDIPSIA: 0
FALLS: 0
COUGH: 0
BACK PAIN: 0
TINGLING: 0
ABDOMINAL PAIN: 0
PALPITATIONS: 0
SPUTUM PRODUCTION: 0
HEMOPTYSIS: 0
SHORTNESS OF BREATH: 0
DIAPHORESIS: 0
BLOOD IN STOOL: 0
DEPRESSION: 0
DOUBLE VISION: 0
BLURRED VISION: 0
SINUS PAIN: 0
NERVOUS/ANXIOUS: 0
FOCAL WEAKNESS: 0
MYALGIAS: 0
SENSORY CHANGE: 0
STRIDOR: 0
BRUISES/BLEEDS EASILY: 0
DIARRHEA: 0
FEVER: 0
NECK PAIN: 0
PHOTOPHOBIA: 0
WEIGHT LOSS: 0
DIZZINESS: 0

## 2021-01-25 ASSESSMENT — PAIN SCALES - GENERAL: PAIN_LEVEL: 0

## 2021-01-25 ASSESSMENT — FIBROSIS 4 INDEX: FIB4 SCORE: 0.36

## 2021-01-25 ASSESSMENT — PAIN DESCRIPTION - PAIN TYPE: TYPE: ACUTE PAIN

## 2021-01-25 NOTE — ASSESSMENT & PLAN NOTE
Newly diagnosed type II with hyperglycemia    Will increase Lantus to 28 units and add Premeal insulin  Continue sliding-scale insulin  Continue Metformin  Diabetic teaching asked RN to teach patient on self injection and monitor CBGs  We will ask case management to schedule appointment with PCP    Recent Labs     02/01/21  0922 02/01/21  1248 02/01/21  1815 02/01/21  2024 02/01/21  2313 02/02/21  0614 02/02/21  1332 02/02/21  1753 02/02/21  2155 02/03/21  0210   POCGLUCOSE 284* 396* 350* 408* 292* 228* 432* 283* 337* 190*

## 2021-01-25 NOTE — PROGRESS NOTES
Neurosurgery Progress Note    Subjective:  Surgery cancelled for inability to oxygenate    Exam:  A&O x3, GCS 15  PERRL, EOMI  Face symm, tongue midline  HOLM with FS, no drift      BP  Min: 108/62  Max: 134/70  Pulse  Av.9  Min: 60  Max: 89  Resp  Av.2  Min: 12  Max: 31  Temp  Av.3 °C (97.3 °F)  Min: 35.9 °C (96.6 °F)  Max: 36.8 °C (98.2 °F)  SpO2  Av.7 %  Min: 93 %  Max: 100 %    No data recorded    Recent Labs     21  1107 21  0325   WBC 15.6* 11.5*   RBC 4.85 4.66   HEMOGLOBIN 15.0 14.7   HEMATOCRIT 44.8 43.2   MCV 92.4 92.7   MCH 30.9 31.5   MCHC 33.5* 34.0   RDW 41.3 41.1   PLATELETCT 397 371   MPV 10.4 10.8     Recent Labs     21  1107 21  0325   SODIUM 132* 134*   POTASSIUM 4.8 4.0   CHLORIDE 96 98   CO2 22 21   GLUCOSE 464* 378*   BUN 19 18   CREATININE 0.81 0.65   CALCIUM 9.9 9.7               Intake/Output       21 - 21 0659 21 07 - 21 0659      [id number] 1009-9515 Total 1900-0659 Total       Intake    I.V.  --  -- --  500  -- 500    Volume (mL) (electrolyte-A (PLASMALYTE-A) infusion) -- -- -- 500 -- 500    Total Intake -- -- -- 500 -- 500       Output    Urine  --  -- --  426  -- 426    Urine Void (mL) -- -- -- 1 -- 1    Output (mL) (Urethral Catheter Latex 16 Fr.) -- -- -- 425 -- 425    Blood  --  -- --  0  -- 0    Est. Blood Loss -- -- -- 0 -- 0    Total Output -- -- -- 426 -- 426       Net I/O     -- -- -- 74 -- 74            Intake/Output Summary (Last 24 hours) at 2021 1428  Last data filed at 2021 1400  Gross per 24 hour   Intake 500 ml   Output 426 ml   Net 74 ml            • levETIRAcetam (Keppra) IV  1,000 mg Pre-Op Once   • insulin regular  3-14 Units 4X/DAY ACHS    And   • glucose  16 g Q15 MIN PRN    And   • dextrose 50%  50 mL Q15 MIN PRN   • dexamethasone  10 mg Q6HRS   • diphenhydrAMINE  25 mg HS PRN   • senna-docusate  2 Tab BID    And   • polyethylene glycol/lytes  1 Packet QDAY PRN    And   •  magnesium hydroxide  30 mL QDAY PRN    And   • bisacodyl  10 mg QDAY PRN   • acetaminophen  650 mg Q6HRS PRN   • ondansetron  4 mg Q4HRS PRN   • ondansetron  4 mg Q4HRS PRN   • promethazine  12.5-25 mg Q4HRS PRN   • promethazine  12.5-25 mg Q4HRS PRN   • prochlorperazine  5-10 mg Q4HRS PRN   • levETIRAcetam  500 mg BID       Assessment and Plan:  Hospital day #5 large left extraaxial calcified mass c/w meningioma sig mass effect  POD #na  Prophylactic anticoagulation: hold         Start date/time: pt ambulatory  keppra 500 bid  Dec 10Q6  ? OR date

## 2021-01-25 NOTE — CONSULTS
Critical Care Consultation    Date of consult: 1/25/2021    Referring Physician  Mando Hale D.O.    Reason for Consultation  hypoxia    History of Presenting Illness  45 y.o. female who presented 1/21/2021 with large calcified meningioma, day 5 of [facility] and there is mass effect.  Planned surgery today but not oxygenating when on ventilator. left side white out on chest xray in OR with ET tube in right main stem.  Per nursing/Dr Soto bronchoscopy in OR with adequate placement per anesthesiology, no procedure note currently.  Repeat intubation without improvement in hypoxia.  No secretions noted.  Critical care consulted by neurosurgery.  Patient now alert and oriented and on room air sating adequately.  I have ordered a repeat chest xray.  Per bedside us per myself adequate LVEF, right ventricle not dilated and fluid status adequate.  A lines upper chest with some scattered b lines lower chest.  No pleural effusion.      Code Status  Full Code    Review of Systems  Review of Systems   Constitutional: Negative for chills, diaphoresis, fever, malaise/fatigue and weight loss.   HENT: Negative for congestion and sinus pain.    Eyes: Negative for blurred vision, double vision and photophobia.   Respiratory: Negative for cough, hemoptysis, sputum production, shortness of breath, wheezing and stridor.    Cardiovascular: Negative for chest pain, palpitations and leg swelling.   Gastrointestinal: Negative for blood in stool, heartburn, melena and vomiting.   Genitourinary: Negative for dysuria and urgency.   Musculoskeletal: Negative for back pain, myalgias and neck pain.   Skin: Negative for itching and rash.   Neurological: Positive for headaches. Negative for dizziness, tingling, sensory change, speech change and focal weakness.   Endo/Heme/Allergies: Negative for polydipsia. Does not bruise/bleed easily.   Psychiatric/Behavioral: Negative for depression. The patient is not nervous/anxious.        Past  Medical History   has a past medical history of Migraine.    Surgical History   has a past surgical history that includes cholecystectomy and other orthopedic surgery.    Family History  family history includes Arterial Aneurysm in her father.    Social History   reports that she has been smoking cigarettes. She has a 5.50 pack-year smoking history. She has never used smokeless tobacco. She reports previous alcohol use. She reports that she does not use drugs.    Medications  Home Medications     Reviewed by Wendy Gray M.D. (Physician) on 01/25/21 at 0838  Med List Status: Complete   Medication Last Dose Status   acetaminophen (TYLENOL) 500 MG Tab 1/21/2021 Active   Ascorbic Acid (VITAMIN C) 1000 MG Tab >3 days ago Active   azithromycin (ZITHROMAX) 250 MG Tab Not Taking Active   ibuprofen (MOTRIN) 200 MG TABS 1/21/2021 Active   Multiple Vitamins-Minerals (OCUVITE PO) >3 days ago Active   Phenyleph-Doxylamine-DM-APAP (TYLENOL COLD/FLU/COUGH NIGHT) 5-6.25- MG/15ML Liquid >2 weeks ago Active   Prenatal MV-Min-Fe Fum-FA-DHA (PRENATAL 1 PO) >3 days ago Active              Current Facility-Administered Medications   Medication Dose Route Frequency Provider Last Rate Last Admin   • levETIRAcetam (Keppra) 1000 mg in 100 mL NaCl IV premix  1,000 mg Intravenous Pre-Op Once Wendy Gray M.D.   Stopped at 01/25/21 1000   • insulin glargine (Lantus) injection  10 Units Subcutaneous Q EVENING Mando Hale D.O.       • insulin regular (HumuLIN R,NovoLIN R) injection  3-14 Units Subcutaneous 4X/DAY ACHS Mando Hale D.O.   14 Units at 01/25/21 0818    And   • glucose 4 g chewable tablet 16 g  16 g Oral Q15 MIN PRN Mando Hale D.O.        And   • dextrose 50% (D50W) injection 50 mL  50 mL Intravenous Q15 MIN PRN Mando Hale D.O.       • dexamethasone (DECADRON) tablet 10 mg  10 mg Oral Q6HRS Win Scanlon M.D.   10 mg at 01/25/21 0559   • diphenhydrAMINE (BENADRYL) tablet/capsule 25 mg  25 mg  Oral HS PRN Alli Narayanan M.D.   25 mg at 01/23/21 2336   • senna-docusate (PERICOLACE or SENOKOT S) 8.6-50 MG per tablet 2 Tab  2 Tab Oral BID Win Scanlon M.D.   2 Tab at 01/25/21 0559    And   • polyethylene glycol/lytes (MIRALAX) PACKET 1 Packet  1 Packet Oral QDAY PRN Win Scanlon M.D.        And   • magnesium hydroxide (MILK OF MAGNESIA) suspension 30 mL  30 mL Oral QDAY PRN Win Scanlon M.D.        And   • bisacodyl (DULCOLAX) suppository 10 mg  10 mg Rectal QDAY PRN Win Scanlon M.D.       • acetaminophen (Tylenol) tablet 650 mg  650 mg Oral Q6HRS PRN Win Scanlon M.D.   650 mg at 01/24/21 2057   • ondansetron (ZOFRAN) syringe/vial injection 4 mg  4 mg Intravenous Q4HRS PRN Win Scanlon M.D.       • ondansetron (ZOFRAN ODT) dispertab 4 mg  4 mg Oral Q4HRS PRN Win Scanlon M.D.       • promethazine (PHENERGAN) tablet 12.5-25 mg  12.5-25 mg Oral Q4HRS PRN Win Scanlon M.D.       • promethazine (PHENERGAN) suppository 12.5-25 mg  12.5-25 mg Rectal Q4HRS PRN Win Scanlon M.D.       • prochlorperazine (COMPAZINE) injection 5-10 mg  5-10 mg Intravenous Q4HRS PRN Win Scanlon M.D.       • levETIRAcetam (KEPPRA) tablet 500 mg  500 mg Oral BID CORAL SteeleDJohnathon   500 mg at 01/25/21 0559       Allergies  Allergies   Allergen Reactions   • Amoxicillin Rash   • Food      bananas       Vital Signs last 24 hours  Temp:  [35.9 °C (96.6 °F)-36.8 °C (98.2 °F)] 36.6 °C (97.9 °F)  Pulse:  [60-89] 76  Resp:  [12-31] 24  BP: (108-134)/(62-83) 134/70  SpO2:  [93 %-100 %] 100 %    Physical Exam  Physical Exam  Vitals signs and nursing note reviewed.   Constitutional:       General: She is not in acute distress.     Appearance: She is not ill-appearing, toxic-appearing or diaphoretic.   HENT:      Head: Normocephalic and atraumatic.      Right Ear: External ear normal.      Left Ear: External ear normal.      Nose: No congestion or rhinorrhea.      Mouth/Throat:      Mouth: Mucous membranes are dry.      Pharynx: No oropharyngeal exudate or  posterior oropharyngeal erythema.   Eyes:      General: No scleral icterus.     Extraocular Movements: Extraocular movements intact.      Conjunctiva/sclera: Conjunctivae normal.      Pupils: Pupils are equal, round, and reactive to light.   Neck:      Musculoskeletal: Neck supple. No neck rigidity or muscular tenderness.   Cardiovascular:      Rate and Rhythm: Regular rhythm. Tachycardia present.      Pulses: Normal pulses.      Heart sounds: Normal heart sounds. No murmur.   Pulmonary:      Effort: No respiratory distress.      Breath sounds: No wheezing.   Abdominal:      General: There is no distension.      Palpations: There is no mass.      Tenderness: There is no abdominal tenderness. There is no guarding.   Musculoskeletal:         General: No swelling or tenderness.      Right lower leg: No edema.      Left lower leg: No edema.   Lymphadenopathy:      Cervical: No cervical adenopathy.   Skin:     Coloration: Skin is not jaundiced or pale.      Findings: No bruising, erythema, lesion or rash.   Neurological:      General: No focal deficit present.      Mental Status: She is alert and oriented to person, place, and time.      Cranial Nerves: No cranial nerve deficit.      Sensory: No sensory deficit.      Motor: No weakness.      Coordination: Coordination normal.      Deep Tendon Reflexes: Reflexes normal.   Psychiatric:         Mood and Affect: Mood normal.         Behavior: Behavior normal.         Fluids    Intake/Output Summary (Last 24 hours) at 1/25/2021 1552  Last data filed at 1/25/2021 1400  Gross per 24 hour   Intake 500 ml   Output 426 ml   Net 74 ml       Laboratory  Recent Results (from the past 48 hour(s))   ACCU-CHEK GLUCOSE    Collection Time: 01/23/21  4:35 PM   Result Value Ref Range    Glucose - Accu-Ck 559 (HH) 65 - 99 mg/dL   ACCU-CHEK GLUCOSE    Collection Time: 01/23/21  9:41 PM   Result Value Ref Range    Glucose - Accu-Ck 540 (HH) 65 - 99 mg/dL   ACCU-CHEK GLUCOSE    Collection Time:  01/23/21 11:39 PM   Result Value Ref Range    Glucose - Accu-Ck 413 (HH) 65 - 99 mg/dL   Basic Metabolic Panel    Collection Time: 01/24/21  3:25 AM   Result Value Ref Range    Sodium 134 (L) 135 - 145 mmol/L    Potassium 4.0 3.6 - 5.5 mmol/L    Chloride 98 96 - 112 mmol/L    Co2 21 20 - 33 mmol/L    Glucose 378 (H) 65 - 99 mg/dL    Bun 18 8 - 22 mg/dL    Creatinine 0.65 0.50 - 1.40 mg/dL    Calcium 9.7 8.5 - 10.5 mg/dL    Anion Gap 15.0 7.0 - 16.0   CBC WITH DIFFERENTIAL    Collection Time: 01/24/21  3:25 AM   Result Value Ref Range    WBC 11.5 (H) 4.8 - 10.8 K/uL    RBC 4.66 4.20 - 5.40 M/uL    Hemoglobin 14.7 12.0 - 16.0 g/dL    Hematocrit 43.2 37.0 - 47.0 %    MCV 92.7 81.4 - 97.8 fL    MCH 31.5 27.0 - 33.0 pg    MCHC 34.0 33.6 - 35.0 g/dL    RDW 41.1 35.9 - 50.0 fL    Platelet Count 371 164 - 446 K/uL    MPV 10.8 9.0 - 12.9 fL    Neutrophils-Polys 84.20 (H) 44.00 - 72.00 %    Lymphocytes 11.90 (L) 22.00 - 41.00 %    Monocytes 3.20 0.00 - 13.40 %    Eosinophils 0.00 0.00 - 6.90 %    Basophils 0.20 0.00 - 1.80 %    Immature Granulocytes 0.50 0.00 - 0.90 %    Nucleated RBC 0.00 /100 WBC    Neutrophils (Absolute) 9.68 (H) 2.00 - 7.15 K/uL    Lymphs (Absolute) 1.37 1.00 - 4.80 K/uL    Monos (Absolute) 0.37 0.00 - 0.85 K/uL    Eos (Absolute) 0.00 0.00 - 0.51 K/uL    Baso (Absolute) 0.02 0.00 - 0.12 K/uL    Immature Granulocytes (abs) 0.06 0.00 - 0.11 K/uL    NRBC (Absolute) 0.00 K/uL   MAGNESIUM    Collection Time: 01/24/21  3:25 AM   Result Value Ref Range    Magnesium 2.3 1.5 - 2.5 mg/dL   PHOSPHORUS    Collection Time: 01/24/21  3:25 AM   Result Value Ref Range    Phosphorus 3.7 2.5 - 4.5 mg/dL   BETA-HYDROXYBUTYRIC ACID    Collection Time: 01/24/21  3:25 AM   Result Value Ref Range    beta-Hydroxybutyric Acid <0.20 0.02 - 0.27 mmol/L   HEMOGLOBIN A1C    Collection Time: 01/24/21  3:25 AM   Result Value Ref Range    Glycohemoglobin 9.7 (H) 0.0 - 5.6 %    Est Avg Glucose 232 mg/dL   PROCALCITONIN    Collection  Time: 01/24/21  3:25 AM   Result Value Ref Range    Procalcitonin <0.05 <0.25 ng/mL   ESTIMATED GFR    Collection Time: 01/24/21  3:25 AM   Result Value Ref Range    GFR If African American >60 >60 mL/min/1.73 m 2    GFR If Non African American >60 >60 mL/min/1.73 m 2   ACCU-CHEK GLUCOSE    Collection Time: 01/24/21  7:39 AM   Result Value Ref Range    Glucose - Accu-Ck 357 (H) 65 - 99 mg/dL   ACCU-CHEK GLUCOSE    Collection Time: 01/24/21 12:14 PM   Result Value Ref Range    Glucose - Accu-Ck 411 (HH) 65 - 99 mg/dL   ACCU-CHEK GLUCOSE    Collection Time: 01/24/21  5:20 PM   Result Value Ref Range    Glucose - Accu-Ck 373 (H) 65 - 99 mg/dL   ACCU-CHEK GLUCOSE    Collection Time: 01/24/21  8:56 PM   Result Value Ref Range    Glucose - Accu-Ck 481 (HH) 65 - 99 mg/dL   ACCU-CHEK GLUCOSE    Collection Time: 01/25/21  8:13 AM   Result Value Ref Range    Glucose - Accu-Ck 452 (HH) 65 - 99 mg/dL   ACCU-CHEK GLUCOSE    Collection Time: 01/25/21 10:49 AM   Result Value Ref Range    Glucose - Accu-Ck 437 (HH) 65 - 99 mg/dL       Imaging  DX-CHEST-LIMITED (1 VIEW)   Final Result      1.  Endotracheal tube in right mainstem bronchus.      2.  Complete atelectatic collapse of the left lung. Recommend pulling back endotracheal tube into the trachea.      3. Slightly aberrant course of esophageal thermometer which overlies the left hemithorax. Recommend repositioning.      1.  An Emergent Document Only message has been documented for NRUIS CROFT in the [facility]  Critical Result system on 1/25/2021 10:26 AM, Message ID [id number].      MR-BRAIN-WITH & W/O   Final Result      1.  43 x 42 x 44 mm left parietal parafalcine vertex meningioma with involvement of the superior sagittal sinus and the parietal calvarium. There is prominent mass effect on the left parietal lobe with mild vasogenic edema.   2.  7 mm intra-axial lesion in the right frontoparietal region with blooming artifact on GRE and a couple additional tiny  "foci of blooming artifact in the right temporal lobe and left parietotemporal region. These probably represent small cavernous    malformations however follow-up is suggested or recommend comparison with old outside prior brain MRI studies.   3.  No acute infarct or intracranial hemorrhage.      DX-CHEST-PORTABLE (1 VIEW)   Final Result         1.  Basilar atelectasis versus early infiltrate      CT-HEAD W/O   Final Result         1.  Large calcified extra-axial mass at the left apex, appearance favors meningioma, there is significant mass effect on the left parietal lobe with surrounding mild vasogenic edema. Recommend follow-up characterization with MRI of the brain without and    with contrast.      These findings were discussed with the patient's clinician, Chang Haq, on 1/21/2021 10:24 PM.          Assessment/Plan  * Meningioma (HCC)- (present on admission)  Assessment & Plan  Pending surgical resection    Hypoxia  Assessment & Plan  In or unable to oxygenate  Chest xray with ET tube in right mainstem with left side whiteout, likely due to right mainstem  Normal chest xray 1/21  Repeat chest xray now that on room air and extubated.  Per report and per Dr Soto bronchoscopy in OR showed normal positioning.  There is a chance that right side sometimes mimics chelsie in appearance.  Second intubation attempt with continued hypoxia, this may be due to atelectasis left side from right main stem intubation was still recovering if et tube adequate placement at that time.  ET tube at 24 cm, patient is 5'6\" so a right main stem is a possibility  Per my bedside us no significant abnormalities for hypervolemia, pericardial effusion, pleural effusion.  Right ventricle normal size, LVEF appeared normal.  Given body habitus, pulmonary hypertension is a possible cause for hypoxia on ventilator if not caused by position of ET tube.  I placed order for ECHO to assess.  Pending chest xray repeat, if clear then likely " malpositioned ET tube.  Low risk pulmonary complication per ARISCAT preoperative pulmonary risk index  Spirometry not necessary especially given intracranial mass and headaches          Atelectasis  Assessment & Plan  Left side  ? Due to right main stem  Repeat chest xray  May have taken time to resolve and cause for prolonged hypoxia in preoperative setting    Obesity (BMI 30.0-34.9)  Assessment & Plan  Likely contributory to pulmonary risk for surgery  No history of albina diagnosis  Possible underlying pulmonary htn  ECHO ordered      Addendum:  Repeat chest xray clear chest, no abnormalities.  Hypoxia in pre op likely due to right main stem and resulting atelectasis.      Discussed patient condition and risk of morbidity and/or mortality with Hospitalist, RN, RT, Pharmacy, Code status disscussed, Charge nurse / hot rounds and neurosurgery.

## 2021-01-25 NOTE — ANESTHESIA PROCEDURE NOTES
Airway    Date/Time: 1/25/2021 9:42 AM  Performed by: Wendy Gray M.D.  Authorized by: Wendy Gray M.D.     Location:  OR  Urgency:  Elective  Difficult Airway: No    Indications for Airway Management:  Anesthesia      Spontaneous Ventilation: absent    Sedation Level:  Deep  Preoxygenated: Yes    Patient Position:  Sniffing  MILS Maintained Throughout: Yes    Mask Difficulty Assessment:  2 - vent by mask + OA or adjuvant +/- NMBA  Final Airway Type:  Endotracheal airway  Final Endotracheal Airway:  ETT  Cuffed: Yes    Technique Used for Successful ETT Placement:  Direct laryngoscopy  Devices/Methods Used in Placement:  Anterior pressure/BURP    Insertion Site:  Oral  Blade Type:  Maurisio  Laryngoscope Blade/Videolaryngoscope Blade Size:  4  ETT Size (mm):  7.5  Measured from:  Lips  ETT to Lips (cm):  23  Placement Verified by: auscultation and capnometry    Cormack-Lehane Classification:  Grade I - full view of glottis  Number of Attempts at Approach:  1  Number of Other Approaches Attempted:  0

## 2021-01-25 NOTE — PROGRESS NOTES
[facility] Medicine Daily Progress Note    Date of Service  1/25/2021    Chief Complaint  45 y.o. female admitted 1/21/2021 with headache    Hospital Course  Ms. Marni Muñoz is a 45 y.o. female who presented on 1/21/2021 with severe migraine x3 days.  Patient has history of migraines controlled with Tylenol and ibuprofen.  Her current migraine is more persistent and intense.  Initially presented to an urgent care, but was referred to the emergency room.  CT head showed a large calcified extra-axial mass in the left apex consistent with meningioma with significant mass-effect on left parietal lobe with surrounding mild vasogenic edema.  MRI brain shows left parietal meningioma with mass-effect on the parietal lobe with mild vasogenic edema.  Patient was started on Decadron and levetiracetam.  She was followed by neurosurgery with plan for craniectomy.  Patient has had elevated blood sugars which were attributed to Decadron.  A1c of 9.7 is consistent with history of diabetes.  She was started on insulin sliding scale as well as diabetic counseling.       Interval Problem Update  Patient was seen and examined at bedside.  I have personally reviewed vitals, labs, and imaging.    1/22.  Afebrile.  Initially presented with tachycardia which has resolved.  On room air.  Denies fever, chills, chest pains or shortness of breath.  Patient reports headache is improved described as pressure and hot poker feeling.  Visual symptoms have resolved.  Plan for craniectomy Monday 1/23.  Afebrile.  Episodes of tachycardia.  On room air.  Denies fever, chills, chest pains, shortness of breath.  Headaches and vision changes have resolved.  Adequate p.o. intake.  Has had hyperglycemia likely secondary to Decadron.  No acidosis on BMP.  1/24.  Afebrile.  Episode of hypotension this morning.  On room air.  Mild leukocytosis likely secondary to steroids.  Negative procalcitonin.  Hemoglobin A1c 9.7 is consistent with diabetes, elevated  blood sugar not just secondary to steroids.  No acidosis or gap on BMP.  Negative beta hydroxybutyric acid.  Continue sliding scale insulin and encourage p.o. fluid intake.  Denies fever, chills, chest pains, shortness of breath.  Has slight headache.  Says she is missing her cigarettes but declines nicotine patch.  1/25.  Afebrile.  Stable vitals.  On room air.  Patient evaluated in the ICU after anesthesia and intubation.  She feels well.  Reports mild headache.  Denies fever, chills, chest pains, shortness of breath, vision changes.  She is thirsty.  She states it feels weird now that her head is shaved.  Plan for craniectomy at a later date.    Consultants/Specialty  NSG    Code Status  Full Code    Disposition  Medical clearance    Review of Systems  Review of Systems   Constitutional: Negative for chills and fever.   HENT: Negative for congestion and sore throat.    Eyes: Negative for blurred vision.   Respiratory: Negative for cough and shortness of breath.    Cardiovascular: Negative for chest pain, palpitations and leg swelling.   Gastrointestinal: Negative for abdominal pain, constipation, diarrhea, nausea and vomiting.   Genitourinary: Negative for dysuria, frequency and urgency.   Musculoskeletal: Negative for falls.   Skin: Negative for rash.   Neurological: Positive for headaches. Negative for dizziness and weakness.   Psychiatric/Behavioral: Negative for depression. The patient is not nervous/anxious.    All other systems reviewed and are negative.       Physical Exam  Temp:  [36 °C (96.8 °F)-36.6 °C (97.8 °F)] 36 °C (96.8 °F)  Pulse:  [77-80] 78  Resp:  [16-17] 16  BP: (108-119)/(62-79) 115/77  SpO2:  [94 %-96 %] 94 %    Physical Exam  Vitals signs and nursing note reviewed.   Constitutional:       General: She is not in acute distress.     Appearance: Normal appearance. She is obese.   HENT:      Head: Normocephalic and atraumatic.      Nose: Nose normal.      Mouth/Throat:      Mouth: Mucous  membranes are moist.      Pharynx: Oropharynx is clear. No oropharyngeal exudate or posterior oropharyngeal erythema.   Eyes:      Extraocular Movements: Extraocular movements intact.      Conjunctiva/sclera: Conjunctivae normal.   Neck:      Musculoskeletal: Normal range of motion and neck supple.   Cardiovascular:      Rate and Rhythm: Normal rate and regular rhythm.      Pulses: Normal pulses.      Heart sounds: Normal heart sounds. No murmur.   Pulmonary:      Effort: Pulmonary effort is normal. No respiratory distress.      Breath sounds: Normal breath sounds. No stridor. No wheezing or rales.   Abdominal:      General: Abdomen is flat. Bowel sounds are normal. There is no distension.      Palpations: Abdomen is soft. There is no mass.      Tenderness: There is no abdominal tenderness.   Skin:     General: Skin is warm.      Capillary Refill: Capillary refill takes less than 2 seconds.   Neurological:      General: No focal deficit present.      Mental Status: She is alert and oriented to person, place, and time. Mental status is at baseline.      Cranial Nerves: No cranial nerve deficit.      Motor: No weakness.   Psychiatric:         Mood and Affect: Mood normal.         Behavior: Behavior normal.         Fluids  No intake or output data in the 24 hours ending 01/25/21 0709    Laboratory  Recent Labs     01/23/21  1107 01/24/21  0325   WBC 15.6* 11.5*   RBC 4.85 4.66   HEMOGLOBIN 15.0 14.7   HEMATOCRIT 44.8 43.2   MCV 92.4 92.7   MCH 30.9 31.5   MCHC 33.5* 34.0   RDW 41.3 41.1   PLATELETCT 397 371   MPV 10.4 10.8     Recent Labs     01/23/21  1107 01/24/21  0325   SODIUM 132* 134*   POTASSIUM 4.8 4.0   CHLORIDE 96 98   CO2 22 21   GLUCOSE 464* 378*   BUN 19 18   CREATININE 0.81 0.65   CALCIUM 9.9 9.7                   Imaging  MR-BRAIN-WITH & W/O   Final Result      1.  43 x 42 x 44 mm left parietal parafalcine vertex meningioma with involvement of the superior sagittal sinus and the parietal calvarium. There  is prominent mass effect on the left parietal lobe with mild vasogenic edema.   2.  7 mm intra-axial lesion in the right frontoparietal region with blooming artifact on GRE and a couple additional tiny foci of blooming artifact in the right temporal lobe and left parietotemporal region. These probably represent small cavernous    malformations however follow-up is suggested or recommend comparison with old outside prior brain MRI studies.   3.  No acute infarct or intracranial hemorrhage.      DX-CHEST-PORTABLE (1 VIEW)   Final Result         1.  Basilar atelectasis versus early infiltrate      CT-HEAD W/O   Final Result         1.  Large calcified extra-axial mass at the left apex, appearance favors meningioma, there is significant mass effect on the left parietal lobe with surrounding mild vasogenic edema. Recommend follow-up characterization with MRI of the brain without and    with contrast.      These findings were discussed with the patient's clinician, Chang Haq, on 1/21/2021 10:24 PM.           Assessment/Plan  * Meningioma (HCC)- (present on admission)  Assessment & Plan  Severe migraine w/ vision changes x3 days  History of migraines since 16years old  No other focal neuro deficits noted  CT head w/ large calcified meningioma w/ mass effect and vasogenic edema  S/p Decadron 4mg, Toradol 30mg, and metoclopramide 10mg  - will initiate Keppra 500mg BID  - Decadron 10mg q6h  - symptomatic management  - MRI brain with left parietal meningioma  - Neurosurgery (Dr. Soto) consulted     Diabetes mellitus (HCC)  Assessment & Plan  A1c 9.7 is diagnostic of diabetes.  Also on high-dose of Decadron.  No acidosis on BMP  Beta hydroxybutyric acid negative  Start insulin sliding scale  Encourage fluid intake    Lab Results   Component Value Date/Time    HBA1C 9.7 (H) 01/24/2021 0325     Results from last 7 days   Lab Units 01/25/21  1049 01/25/21  0813 01/24/21  2056 01/24/21  1720 01/24/21  1214 01/24/21  0739  01/23/21  2339 01/23/21  2141   ACCU CHECK GLUCOSE 788 mg/dL 437* 452* 481* 373* 411* 357* 413* 540*     I have ordered insulin sliding scale with D50 and glucagon for hypoglycemia per protocol.  Diabetic diet  Diabetic education    Leukocytosis  Assessment & Plan  Trending down.  Patient is afebrile.  Does not endorse any symptoms concerning for infection  Negative procalcitonin.  Likely secondary to steroids.       VTE prophylaxis: Enoxaparin.  Hold for planned surgery tomorrow.

## 2021-01-25 NOTE — ANESTHESIA POSTPROCEDURE EVALUATION
Patient: Marni Muñoz    Procedure Summary     Date: 01/25/21 Room / Location: [facility] 03 / SURGERY [facility]    Anesthesia Start: 0932 Anesthesia Stop: 1051    Procedure: [[CANCELLED]] CRANIOTOMY, USING FRAMELESS STEREOTAXY-FOR TUMOR (Left ) Diagnosis: (brain mass and intraoperative hypoxia)    Surgeons: Luke Soto M.D. Responsible Provider: Wendy Gray M.D.    Anesthesia Type: general ASA Status: 2          Final Anesthesia Type: general  Last vitals  BP   Blood Pressure: 134/70, Arterial BP: 113/69    Temp   36.6 °C (97.9 °F)    Pulse   Pulse: 76   Resp   (Abnormal) 24    SpO2   100 %      Anesthesia Post Evaluation    Patient location during evaluation: PACU  Patient participation: complete - patient participated  Level of consciousness: awake and alert  Pain score: 0    Airway patency: patent  Anesthetic complications: no  Cardiovascular status: hemodynamically stable  Respiratory status: acceptable  Hydration status: euvolemic  Comments: Patient stable for discharge from PACU; intra op events noted.    PONV: none           Nurse Pain Score: 0 (NPRS)

## 2021-01-25 NOTE — ANESTHESIA PREPROCEDURE EVALUATION
"45 y.o. female who presented 1/21/2021 with complaints of severe migraine. Pt reported she has had history of migraines since she was 15yo. She stated that her migraine was much more severe than it usually is and has been persisting about 3 days.  Her pain is located in her temples and is sharp pain that radiated to to the top of her head.  She additionally reported photophobia, double vision, blurred vision, as well as some color changes.     Imaging:  CT-HEAD W/O   Final Result          1.  Large calcified extra-axial mass at the left apex, appearance favors meningioma, there is significant mass effect on the left parietal lobe with surrounding mild vasogenic edema. Recommend follow-up characterization with MRI of the brain without and    with contrast.       Past Medical History     Date Comments   Migraine [G43.909]          Past Surgical History    Past Surgical History Laterality Last Occurrence Comments   CHOLECYSTECTOMY [51.22] Not specified Not specified None   OTHER ORTHOPEDIC SURGERY [FR65912] Not specified Not specified L meniscus and ACL repair   Social History    Tobacco History    Smoking Status   Current Some Day Smoker Last attempt to quit   10/8/2010 Smoking Frequency   0.25 packs per day for 22 years (5.5 pack years) Smoking Tobacco Type   Cigarettes   Smokeless Tobacco Use   Never Used   Alcohol History    Alcohol Use Status   Not Currently   Drug Use    Drug Use Status   No         Allergies: Amoxicillin, Food    HT: 1.676 m (5' 6\")   WT: 98.6 kg (217 lb 6 oz)   Admission Wt: 98.6 kg (217 lb 6 oz)   BMI: 35.09 kg/m     Vitals  Row Name  01/25/21   0754     BP  133/82       Recorded by: SG     Pulse  89       Recorded by: JUAN     Resp  18       Recorded by: JUAN     Temp  35.9 °C (96.6 °F)       Recorded by: SG     Temp src  Temporal       Recorded by: JUAN            Oxygen Therapy Group  Row Name  01/25/21   0754     SpO2  93 %       Recorded by: JUAN     O2 Delivery Device  None - Room Air         "       Physical Exam    Airway   Mallampati: I  TM distance: >3 FB  Neck ROM: full       Cardiovascular - normal exam  Rhythm: regular  Rate: normal  (-) murmur     Dental - normal exam        Very poor dentition   Pulmonary - normal exam  Breath sounds clear to auscultation     Abdominal    Neurological - normal exam               Anesthesia Plan    ASA 2       Plan - general       Airway plan will be ETT      Plan Factors:   Patient was not previously instructed to abstain from smoking on day of procedure.  Patient did not smoke on day of procedure.      Induction: intravenous    Postoperative Plan: Postoperative administration of opioids is intended.    Pertinent diagnostic labs and testing reviewed    Informed Consent:    Anesthetic plan and risks discussed with patient.    Use of blood products discussed with: patient whom consented to blood products.

## 2021-01-25 NOTE — PROGRESS NOTES
1220 Pt arrived to S114 from PACU.     2 RN skin check: no areas of concern.  Head shaved and marked with surgical marker for surgery that was supposed to occur today.    Pt a&ox4, sensitive to light, no blurred vision or double vision. HOLM equally and strong.

## 2021-01-25 NOTE — PROGRESS NOTES
Belongings:  -Ady bear  -eye mask  -socks  -bathroom bag (contents not reviewed)  -gray slippers  -HP black laptop  -clothes  -knitting bag (contents not reviewed)    Per RN on neuro, patient's friendd Rani has cell phone and glasses.

## 2021-01-25 NOTE — ANESTHESIA PROCEDURE NOTES
Arterial Line  Performed by: Wendy Gray M.D.  Authorized by: Wendy Gray M.D.     Start Time:  1/25/2021 9:49 AM  End Time:  1/25/2021 9:53 AM  Localization: surface landmarks    Patient Location:  OR  Indication: continuous blood pressure monitoring        Catheter Size:  20 G  Seldinger Technique?: Yes    Laterality:  Left  Site:  Radial artery  Line Secured:  Antimicrobial disc, tape and transparent dressing  Events: patient tolerated procedure well with no complications

## 2021-01-25 NOTE — ANESTHESIA TIME REPORT
Anesthesia Start and Stop Event Times     Date Time Event    1/25/2021 09:11 AM Ready for Procedure    1/25/2021 09:32 AM Anesthesia Start    1/25/2021 10:51 AM Anesthesia Stop        Responsible Staff  01/25/21    Name Role Begin End    Wendy Gray M.D. Anesthesiologist 01/25/21 09:32 AM 01/25/21 10:51 AM        Preop Diagnosis (Free Text):  Pre-op Diagnosis     brain mass        Preop Diagnosis (Codes):    Post op Diagnosis  Brain mass      Premium Reason  Non-Premium    Comments: Procedure abandoned secondary to intraoperative hypoxia

## 2021-01-25 NOTE — CARE PLAN
Problem: Respiratory:  Goal: Respiratory status will improve  Note: Continuous pulse ox in place. Pt encouraged to use IS and cough deep breathe - taught to splint with pillow for chest discomfort.      Problem: Neuro Status  Goal: Monitor neuro status and rapid identification of neuro changes  Note: Neuro checks per MD order. Notifying MD of any changes in neuro status.

## 2021-01-25 NOTE — OR NURSING
Patient arrived to PACU, monitor applied. Denies pain and nausea. Blood sugar treated per orders. Tolerating sips of water. Friend Rani updated on patient condition, plan of care, and change in room to S114.

## 2021-01-25 NOTE — PROGRESS NOTES
0030 Received call from pt's mother requesting information regarding pt's status and what time she can be here. Pt's mother notified that the patient is allowed one designated visitor per stay and that she has someone else listed so she will not be able to see her until discharge unless special arrangements are made with management. Pt's mother very upset with policy, but was informed to call in the morning while pt is awake to obtain information with pt's approval.

## 2021-01-26 ENCOUNTER — APPOINTMENT (OUTPATIENT)
Dept: RADIOLOGY | Facility: MEDICAL CENTER | Age: 46
DRG: 025 | End: 2021-01-26
Attending: INTERNAL MEDICINE
Payer: COMMERCIAL

## 2021-01-26 LAB
ANION GAP SERPL CALC-SCNC: 10 MMOL/L (ref 7–16)
BASOPHILS # BLD AUTO: 0.1 % (ref 0–1.8)
BASOPHILS # BLD: 0.01 K/UL (ref 0–0.12)
BUN SERPL-MCNC: 17 MG/DL (ref 8–22)
CALCIUM SERPL-MCNC: 9 MG/DL (ref 8.5–10.5)
CHLORIDE SERPL-SCNC: 96 MMOL/L (ref 96–112)
CO2 SERPL-SCNC: 25 MMOL/L (ref 20–33)
CREAT SERPL-MCNC: 0.59 MG/DL (ref 0.5–1.4)
EOSINOPHIL # BLD AUTO: 0 K/UL (ref 0–0.51)
EOSINOPHIL NFR BLD: 0 % (ref 0–6.9)
ERYTHROCYTE [DISTWIDTH] IN BLOOD BY AUTOMATED COUNT: 40.3 FL (ref 35.9–50)
GLUCOSE BLD-MCNC: 384 MG/DL (ref 65–99)
GLUCOSE SERPL-MCNC: 300 MG/DL (ref 65–99)
HCT VFR BLD AUTO: 42.7 % (ref 37–47)
HGB BLD-MCNC: 14.5 G/DL (ref 12–16)
IMM GRANULOCYTES # BLD AUTO: 0.07 K/UL (ref 0–0.11)
IMM GRANULOCYTES NFR BLD AUTO: 0.6 % (ref 0–0.9)
LYMPHOCYTES # BLD AUTO: 1.52 K/UL (ref 1–4.8)
LYMPHOCYTES NFR BLD: 13.2 % (ref 22–41)
MAGNESIUM SERPL-MCNC: 2.3 MG/DL (ref 1.5–2.5)
MCH RBC QN AUTO: 31.2 PG (ref 27–33)
MCHC RBC AUTO-ENTMCNC: 34 G/DL (ref 33.6–35)
MCV RBC AUTO: 91.8 FL (ref 81.4–97.8)
MONOCYTES # BLD AUTO: 0.8 K/UL (ref 0–0.85)
MONOCYTES NFR BLD AUTO: 7 % (ref 0–13.4)
NEUTROPHILS # BLD AUTO: 9.1 K/UL (ref 2–7.15)
NEUTROPHILS NFR BLD: 79.1 % (ref 44–72)
NRBC # BLD AUTO: 0 K/UL
NRBC BLD-RTO: 0 /100 WBC
NT-PROBNP SERPL IA-MCNC: 92 PG/ML (ref 0–125)
PHOSPHATE SERPL-MCNC: 3.5 MG/DL (ref 2.5–4.5)
PLATELET # BLD AUTO: 307 K/UL (ref 164–446)
PMV BLD AUTO: 10.6 FL (ref 9–12.9)
POTASSIUM SERPL-SCNC: 4 MMOL/L (ref 3.6–5.5)
RBC # BLD AUTO: 4.65 M/UL (ref 4.2–5.4)
SODIUM SERPL-SCNC: 131 MMOL/L (ref 135–145)
WBC # BLD AUTO: 11.5 K/UL (ref 4.8–10.8)

## 2021-01-26 PROCEDURE — 99233 SBSQ HOSP IP/OBS HIGH 50: CPT | Performed by: INTERNAL MEDICINE

## 2021-01-26 PROCEDURE — 99232 SBSQ HOSP IP/OBS MODERATE 35: CPT | Performed by: HOSPITALIST

## 2021-01-26 PROCEDURE — 71045 X-RAY EXAM CHEST 1 VIEW: CPT

## 2021-01-26 PROCEDURE — 700102 HCHG RX REV CODE 250 W/ 637 OVERRIDE(OP): Performed by: INTERNAL MEDICINE

## 2021-01-26 PROCEDURE — 85025 COMPLETE CBC W/AUTO DIFF WBC: CPT

## 2021-01-26 PROCEDURE — A9270 NON-COVERED ITEM OR SERVICE: HCPCS | Performed by: STUDENT IN AN ORGANIZED HEALTH CARE EDUCATION/TRAINING PROGRAM

## 2021-01-26 PROCEDURE — 770020 HCHG ROOM/CARE - TELE (206)

## 2021-01-26 PROCEDURE — 83735 ASSAY OF MAGNESIUM: CPT

## 2021-01-26 PROCEDURE — 700102 HCHG RX REV CODE 250 W/ 637 OVERRIDE(OP): Performed by: STUDENT IN AN ORGANIZED HEALTH CARE EDUCATION/TRAINING PROGRAM

## 2021-01-26 PROCEDURE — 84100 ASSAY OF PHOSPHORUS: CPT

## 2021-01-26 PROCEDURE — 83880 ASSAY OF NATRIURETIC PEPTIDE: CPT

## 2021-01-26 PROCEDURE — 82962 GLUCOSE BLOOD TEST: CPT

## 2021-01-26 PROCEDURE — A9270 NON-COVERED ITEM OR SERVICE: HCPCS | Performed by: INTERNAL MEDICINE

## 2021-01-26 PROCEDURE — 80048 BASIC METABOLIC PNL TOTAL CA: CPT

## 2021-01-26 RX ORDER — INSULIN GLARGINE 100 [IU]/ML
20 INJECTION, SOLUTION SUBCUTANEOUS ONCE
Status: COMPLETED | OUTPATIENT
Start: 2021-01-26 | End: 2021-01-26

## 2021-01-26 RX ORDER — INSULIN GLARGINE 100 [IU]/ML
30 INJECTION, SOLUTION SUBCUTANEOUS EVERY EVENING
Status: DISCONTINUED | OUTPATIENT
Start: 2021-01-27 | End: 2021-01-27

## 2021-01-26 RX ORDER — SODIUM CHLORIDE 1 G/1
1 TABLET ORAL EVERY 8 HOURS
Status: DISCONTINUED | OUTPATIENT
Start: 2021-01-26 | End: 2021-01-31

## 2021-01-26 RX ADMIN — LEVETIRACETAM 500 MG: 500 TABLET ORAL at 05:26

## 2021-01-26 RX ADMIN — INSULIN HUMAN 12 UNITS: 100 INJECTION, SOLUTION PARENTERAL at 10:28

## 2021-01-26 RX ADMIN — DEXAMETHASONE 10 MG: 4 TABLET ORAL at 18:00

## 2021-01-26 RX ADMIN — INSULIN HUMAN 12 UNITS: 100 INJECTION, SOLUTION PARENTERAL at 21:30

## 2021-01-26 RX ADMIN — Medication 1 G: at 14:30

## 2021-01-26 RX ADMIN — DOCUSATE SODIUM 50 MG AND SENNOSIDES 8.6 MG 2 TABLET: 8.6; 5 TABLET, FILM COATED ORAL at 17:59

## 2021-01-26 RX ADMIN — DEXAMETHASONE 10 MG: 4 TABLET ORAL at 05:26

## 2021-01-26 RX ADMIN — Medication 1 G: at 11:03

## 2021-01-26 RX ADMIN — LEVETIRACETAM 500 MG: 500 TABLET ORAL at 17:59

## 2021-01-26 RX ADMIN — DEXAMETHASONE 10 MG: 4 TABLET ORAL at 11:04

## 2021-01-26 RX ADMIN — INSULIN HUMAN 14 UNITS: 100 INJECTION, SOLUTION PARENTERAL at 18:06

## 2021-01-26 RX ADMIN — INSULIN GLARGINE 20 UNITS: 100 INJECTION, SOLUTION SUBCUTANEOUS at 10:28

## 2021-01-26 RX ADMIN — Medication 1 G: at 21:32

## 2021-01-26 RX ADMIN — DIPHENHYDRAMINE HYDROCHLORIDE 25 MG: 25 TABLET ORAL at 21:33

## 2021-01-26 RX ADMIN — DOCUSATE SODIUM 50 MG AND SENNOSIDES 8.6 MG 2 TABLET: 8.6; 5 TABLET, FILM COATED ORAL at 05:26

## 2021-01-26 ASSESSMENT — ENCOUNTER SYMPTOMS
BLURRED VISION: 0
HEADACHES: 1
WEIGHT LOSS: 0
COUGH: 0
PHOTOPHOBIA: 0
SHORTNESS OF BREATH: 0
SPUTUM PRODUCTION: 0
MYALGIAS: 0
VOMITING: 0
NAUSEA: 0
SENSORY CHANGE: 0
DOUBLE VISION: 0
ABDOMINAL PAIN: 0
DEPRESSION: 0
LOSS OF CONSCIOUSNESS: 0
HEMOPTYSIS: 0
STRIDOR: 0
FEVER: 0
BACK PAIN: 0
FOCAL WEAKNESS: 0
DIAPHORESIS: 0
NECK PAIN: 0
DIARRHEA: 0
SINUS PAIN: 0
PALPITATIONS: 0
NERVOUS/ANXIOUS: 0
SORE THROAT: 0
CHILLS: 0
WHEEZING: 0
SPEECH CHANGE: 0
BRUISES/BLEEDS EASILY: 0
POLYDIPSIA: 0
TINGLING: 0
DIZZINESS: 0
BLOOD IN STOOL: 0
HEARTBURN: 0

## 2021-01-26 ASSESSMENT — PAIN DESCRIPTION - PAIN TYPE: TYPE: ACUTE PAIN

## 2021-01-26 ASSESSMENT — FIBROSIS 4 INDEX
FIB4 SCORE: 0.36
FIB4 SCORE: 0.44

## 2021-01-26 NOTE — PROGRESS NOTES
"Critical Care Progress Note    Date of admission  1/21/2021    Chief Complaint  45 y.o. female transferred to ICU 1/25 with hypoxia after intubation for surgery.    Hospital Course  \"45 y.o. female who presented 1/21/2021 with large calcified meningioma, day 5 of [facility] and there is mass effect.  Planned surgery today but not oxygenating when on ventilator. left side white out on chest xray in OR with ET tube in right main stem.  Per nursing/Dr Soto bronchoscopy in OR with adequate placement per anesthesiology, no procedure note currently.  Repeat intubation without improvement in hypoxia.  No secretions noted.  Critical care consulted by neurosurgery.  Patient now alert and oriented and on room air sating adequately.  I have ordered a repeat chest xray.  Per bedside us per myself adequate LVEF, right ventricle not dilated and fluid status adequate.  A lines upper chest with some scattered b lines lower chest.  No pleural effusion. \"    1/26 -room air, no distress, transfer out of ICU, plan for surgery in the coming days    Interval Problem Update  Reviewed last 24 hour events:  A/o,   Room air  IS 1500  H/o tobacco use, mild couth  I/O =   guevara DM diet  On decadron  Not NPO, no breakfast yet  Na 131  keppra 500 bid  Decadron 10 q6  Lantus 10, SSI 40  Inc to 30    Review of Systems  Review of Systems   Constitutional: Negative for chills, diaphoresis, fever, malaise/fatigue and weight loss.   HENT: Negative for congestion and sinus pain.    Eyes: Negative for blurred vision, double vision and photophobia.   Respiratory: Negative for cough, hemoptysis, sputum production, shortness of breath, wheezing and stridor.    Cardiovascular: Negative for chest pain, palpitations and leg swelling.   Gastrointestinal: Negative for blood in stool, heartburn, melena and vomiting.   Genitourinary: Negative for dysuria and urgency.   Musculoskeletal: Negative for back pain, myalgias and neck pain.   Skin: Negative for " itching and rash.   Neurological: Positive for headaches. Negative for dizziness, tingling, sensory change, speech change and focal weakness.   Endo/Heme/Allergies: Negative for polydipsia. Does not bruise/bleed easily.   Psychiatric/Behavioral: Negative for depression. The patient is not nervous/anxious.         Vital Signs for last 24 hours   Temp:  [35.9 °C (96.6 °F)-36.8 °C (98.2 °F)] 36.4 °C (97.5 °F)  Pulse:  [60-89] 64  Resp:  [11-51] 18  BP: (101-134)/(60-83) 115/67  SpO2:  [93 %-100 %] 100 %    Hemodynamic parameters for last 24 hours       Respiratory Information for the last 24 hours       Physical Exam   Physical Exam  Vitals signs and nursing note reviewed.   Constitutional:       General: She is not in acute distress.     Appearance: She is not ill-appearing, toxic-appearing or diaphoretic.   HENT:      Head: Normocephalic and atraumatic.      Right Ear: External ear normal.      Left Ear: External ear normal.      Nose: No congestion or rhinorrhea.      Mouth/Throat:      Mouth: Mucous membranes are moist.      Pharynx: No oropharyngeal exudate or posterior oropharyngeal erythema.   Eyes:      General: No scleral icterus.     Extraocular Movements: Extraocular movements intact.      Conjunctiva/sclera: Conjunctivae normal.      Pupils: Pupils are equal, round, and reactive to light.   Neck:      Musculoskeletal: Neck supple. No neck rigidity or muscular tenderness.   Cardiovascular:      Rate and Rhythm: Normal rate and regular rhythm.      Pulses: Normal pulses.      Heart sounds: Normal heart sounds. No murmur.   Pulmonary:      Effort: No respiratory distress.      Breath sounds: No wheezing.   Abdominal:      General: There is no distension.      Palpations: There is no mass.      Tenderness: There is no abdominal tenderness. There is no guarding.   Musculoskeletal:         General: No swelling or tenderness.      Right lower leg: No edema.      Left lower leg: No edema.   Lymphadenopathy:       Cervical: No cervical adenopathy.   Skin:     Coloration: Skin is not jaundiced or pale.      Findings: No bruising, erythema, lesion or rash.   Neurological:      General: No focal deficit present.      Mental Status: She is alert and oriented to person, place, and time.      Cranial Nerves: No cranial nerve deficit.      Sensory: No sensory deficit.      Motor: No weakness.      Coordination: Coordination normal.      Deep Tendon Reflexes: Reflexes normal.   Psychiatric:         Mood and Affect: Mood normal.         Behavior: Behavior normal.         Medications  Current Facility-Administered Medications   Medication Dose Route Frequency Provider Last Rate Last Admin   • levETIRAcetam (Keppra) 1000 mg in 100 mL NaCl IV premix  1,000 mg Intravenous Pre-Op Once Wendy Gray M.D.   Stopped at 01/25/21 1000   • insulin glargine (Lantus) injection  10 Units Subcutaneous Q EVENING Mando Hale D.O.   10 Units at 01/25/21 1719   • insulin regular (HumuLIN R,NovoLIN R) injection  3-14 Units Subcutaneous 4X/DAY ACHS Mando Hale D.O.   14 Units at 01/25/21 2107    And   • glucose 4 g chewable tablet 16 g  16 g Oral Q15 MIN PRN Mando Hale D.O.        And   • dextrose 50% (D50W) injection 50 mL  50 mL Intravenous Q15 MIN PRN Mando Hale D.O.       • dexamethasone (DECADRON) tablet 10 mg  10 mg Oral Q6HRS Win Scanlon M.D.   10 mg at 01/26/21 0526   • diphenhydrAMINE (BENADRYL) tablet/capsule 25 mg  25 mg Oral HS PRN Alli Narayanan M.D.   25 mg at 01/25/21 2106   • senna-docusate (PERICOLACE or SENOKOT S) 8.6-50 MG per tablet 2 Tab  2 Tab Oral BID Win Scanlon M.D.   2 Tab at 01/26/21 0526    And   • polyethylene glycol/lytes (MIRALAX) PACKET 1 Packet  1 Packet Oral QDAY PRN Win Scanlon M.D.        And   • magnesium hydroxide (MILK OF MAGNESIA) suspension 30 mL  30 mL Oral QDAY PRN Win Scanlon M.D.        And   • bisacodyl (DULCOLAX) suppository 10 mg  10 mg Rectal QDAY PRN Win Scanlon M.D.       •  acetaminophen (Tylenol) tablet 650 mg  650 mg Oral Q6HRS PRN Win CULVER Ip, M.D.   650 mg at 01/24/21 2057   • ondansetron (ZOFRAN) syringe/vial injection 4 mg  4 mg Intravenous Q4HRS PRN Win CULVER Ip, M.D.       • ondansetron (ZOFRAN ODT) dispertab 4 mg  4 mg Oral Q4HRS PRN Win CULVER Ip, M.D.       • promethazine (PHENERGAN) tablet 12.5-25 mg  12.5-25 mg Oral Q4HRS PRN Win CULVER Ip, M.D.       • promethazine (PHENERGAN) suppository 12.5-25 mg  12.5-25 mg Rectal Q4HRS PRN Win CULVER Ip, M.D.       • prochlorperazine (COMPAZINE) injection 5-10 mg  5-10 mg Intravenous Q4HRS PRN Win CULVER Ip, M.D.       • levETIRAcetam (KEPPRA) tablet 500 mg  500 mg Oral BID Win CULVER Ip, M.D.   500 mg at 01/26/21 0526       Fluids    Intake/Output Summary (Last 24 hours) at 1/26/2021 0652  Last data filed at 1/26/2021 0600  Gross per 24 hour   Intake 1300 ml   Output 1516 ml   Net -216 ml       Laboratory  Recent Labs     01/25/21  1006   ISTATAPH 7.319*   ISTATAPCO2 45.5*   ISTATAPO2 57*   ISTATATCO2 25   [id number] 86*   ISTATARTHCO3 23.4   ISTATARTBE -3   ISTATTEMP 35.9 C   ISTATFIO2 100   ISTATSPEC Arterial   ISTATAPHTC 7.334*   [id number] 52*         Recent Labs     01/23/21  1107 01/24/21  0325 01/26/21  0424   SODIUM 132* 134* 131*   POTASSIUM 4.8 4.0 4.0   CHLORIDE 96 98 96   CO2 22 21 25   BUN 19 18 17   CREATININE 0.81 0.65 0.59   MAGNESIUM 2.2 2.3 2.3   PHOSPHORUS 4.4 3.7 3.5   CALCIUM 9.9 9.7 9.0     Recent Labs     01/23/21  1107 01/24/21 0325 01/26/21 0424   ALTSGPT 10  --   --    ASTSGOT 9*  --   --    ALKPHOSPHAT 64  --   --    TBILIRUBIN 0.7  --   --    GLUCOSE 464* 378* 300*     Recent Labs     01/23/21  1107 01/24/21 0325 01/26/21 0424   WBC 15.6* 11.5* 11.5*   NEUTSPOLYS 87.80* 84.20* 79.10*   LYMPHOCYTES 9.20* 11.90* 13.20*   MONOCYTES 2.10 3.20 7.00   EOSINOPHILS 0.00 0.00 0.00   BASOPHILS 0.10 0.20 0.10   ASTSGOT 9*  --   --    ALTSGPT 10  --   --    ALKPHOSPHAT 64  --   --    TBILIRUBIN 0.7  --   --      Recent Labs      "01/23/21  1107 01/24/21  0325 01/26/21  0424   RBC 4.85 4.66 4.65   HEMOGLOBIN 15.0 14.7 14.5   HEMATOCRIT 44.8 43.2 42.7   PLATELETCT 397 371 307       Imaging  X-Ray:  I have personally reviewed the images and compared with prior images.    Assessment/Plan  * Meningioma (HCC)- (present on admission)  Assessment & Plan  Pending surgical resection    Hypoxia  Assessment & Plan  In or unable to oxygenate  Chest xray with ET tube in right mainstem with left side whiteout, likely due to right mainstem  Normal chest xray 1/21  Repeat chest xray now that on room air and extubated.  Per report and per Dr Soto bronchoscopy in OR showed normal positioning.  There is a chance that right side sometimes mimics chelsie in appearance.  Second intubation attempt with continued hypoxia, this may be due to atelectasis left side from right main stem intubation was still recovering if et tube adequate placement at that time.  ET tube at 24 cm, patient is 5'6\" so a right main stem is a possibility  Per my bedside us no significant abnormalities for hypervolemia, pericardial effusion, pleural effusion.  Right ventricle normal size, LVEF appeared normal.  Given body habitus, pulmonary hypertension is a possible cause for hypoxia on ventilator if not caused by position of ET tube.  I placed order for ECHO to assess.  Pending chest xray repeat, if clear then likely malpositioned ET tube.  Low risk pulmonary complication per ARISCAT preoperative pulmonary risk index  Spirometry not necessary especially given intracranial mass and headaches          Atelectasis  Assessment & Plan  Left side  ? Due to right main stem  Repeat chest xray  May have taken time to resolve and cause for prolonged hypoxia in preoperative setting    Obesity (BMI 30.0-34.9)  Assessment & Plan  Likely contributory to pulmonary risk for surgery  No history of albina diagnosis  Possible underlying pulmonary htn  ECHO ordered      Diabetes mellitus (HCC)  Assessment & " Plan  Poorly controlled, likely hemoglobin 9.7  Exacerbated by current steroid use  Increase Lantus, continue SSI       Update:  CXR looks like right mainstem intubation.  Atelectasis resolved and on room air.  Okay for surgery anytime.  I will add Lantus and increase glycemic control in the meantime as she is continuing on Decadron.  Sodium is slightly low.  I added salt tabs  Okay to transfer out of ICU.  Case discussed with hospitalist and neurosurgery.    I have performed a physical exam and reviewed and updated ROS and Plan today (1/26/2021). In review of yesterday's note (1/25/2021), there are no changes except as documented above.

## 2021-01-26 NOTE — CARE PLAN
Problem: Respiratory:  Goal: Respiratory status will improve  Note: Continuous pulse ox in place. Pt encouraged to use IS and cough deep breathe.      Problem: Neuro Status  Goal: Monitor neuro status and rapid identification of neuro changes  Note: Q2h neuro checks or per MD order. Notifying MD of any neuro status changes.

## 2021-01-26 NOTE — ASSESSMENT & PLAN NOTE
Left side  ? Due to right main stem  Repeat chest xray  May have taken time to resolve and cause for prolonged hypoxia in preoperative setting

## 2021-01-26 NOTE — ASSESSMENT & PLAN NOTE
Likely contributory to pulmonary risk for surgery  No history of albina diagnosis, monitoring  Possible underlying pulmonary htn, no regurgitant jet noted on echo, RVSP not estimated  ECHO normal otherwise

## 2021-01-26 NOTE — ASSESSMENT & PLAN NOTE
S/p resection with Dr. Soto 1/29  Neurochecks and VS per protocol  SBP < 150  Maintain euvolemia and normal sodium/blood sugars  Wean Decadron over 2 weeks per NS  Ongoing seizure treatment  Reduce neurochecks to every 4 hours

## 2021-01-26 NOTE — PROGRESS NOTES
"Hospital Medicine Daily Progress Note    Date of Service  1/26/2021    Chief Complaint  45 y.o. female admitted 1/21/2021 with headache    Hospital Course  44yo with CC of severe HA.  Has an Hx of migraine from the age of 16 which normally responds to APAP or ibuprofen.  Her HA on this occasion was a change in her normal pattern which lead to a CT head showing a large calcified L apex meningioma.  Pt was taken to the OR for resection on 1/25.  After intubation the pt was hypoxic which did not respond to initial interventions.  CXR done showing whiteout of L hemithorax and R main stem intubation.  Procedure was canceled     Interval Problem Update  Pt states she feels \"pretty good\".  cont's to have HA however pain  Is controled with meds.  No N, weakness, sensation chanages or problems with her balance    Consultants/Specialty  Neuro Srgry    Code Status  Full Code    Disposition  OK to Neuro    Review of Systems  Review of Systems   Constitutional: Negative for chills and fever.   HENT: Negative for nosebleeds and sore throat.    Eyes: Negative for blurred vision and double vision.   Respiratory: Negative for cough and shortness of breath.    Cardiovascular: Negative for chest pain, palpitations and leg swelling.   Gastrointestinal: Negative for abdominal pain, diarrhea, nausea and vomiting.   Genitourinary: Negative for dysuria and urgency.   Musculoskeletal: Negative for back pain.   Skin: Negative for rash.   Neurological: Positive for headaches. Negative for dizziness and loss of consciousness.        Physical Exam  Temp:  [36.4 °C (97.5 °F)-36.7 °C (98 °F)] 36.7 °C (98 °F)  Pulse:  [62-93] 93  Resp:  [10-51] 39  BP: (101-115)/(60-71) 112/68  SpO2:  [96 %-100 %] 99 %    Physical Exam  Vitals signs reviewed.   Constitutional:       General: She is not in acute distress.     Appearance: Normal appearance. She is well-developed. She is not diaphoretic.   HENT:      Head: Normocephalic and atraumatic.   Neck:      " Musculoskeletal: Neck supple. No neck rigidity.      Vascular: No JVD.   Cardiovascular:      Rate and Rhythm: Normal rate and regular rhythm.   Pulmonary:      Effort: Pulmonary effort is normal. No respiratory distress.      Breath sounds: No stridor. No wheezing or rales.   Abdominal:      Palpations: Abdomen is soft.      Tenderness: There is no abdominal tenderness. There is no guarding or rebound.   Musculoskeletal:         General: No tenderness.      Right lower leg: No edema.      Left lower leg: No edema.   Skin:     General: Skin is warm and dry.      Findings: No rash.   Neurological:      General: No focal deficit present.      Mental Status: She is alert and oriented to person, place, and time.   Psychiatric:         Mood and Affect: Mood normal.         Thought Content: Thought content normal.         Fluids    Intake/Output Summary (Last 24 hours) at 1/26/2021 1310  Last data filed at 1/26/2021 1100  Gross per 24 hour   Intake 1500 ml   Output 1405 ml   Net 95 ml       Laboratory  Recent Labs     01/24/21  0325 01/26/21  0424   WBC 11.5* 11.5*   RBC 4.66 4.65   HEMOGLOBIN 14.7 14.5   HEMATOCRIT 43.2 42.7   MCV 92.7 91.8   MCH 31.5 31.2   MCHC 34.0 34.0   RDW 41.1 40.3   PLATELETCT 371 307   MPV 10.8 10.6     Recent Labs     01/24/21  0325 01/26/21  0424   SODIUM 134* 131*   POTASSIUM 4.0 4.0   CHLORIDE 98 96   CO2 21 25   GLUCOSE 378* 300*   BUN 18 17   CREATININE 0.65 0.59   CALCIUM 9.7 9.0                   Imaging  DX-CHEST-PORTABLE (1 VIEW)   Final Result         1.  Suspect subtle infiltrate at the left lung base.   2.  Cardiomegaly      EC-ECHOCARDIOGRAM COMPLETE W/O CONT   Final Result      DX-CHEST-LIMITED (1 VIEW)   Final Result      1.  Interval extubation.      2.  Marked interval improvement in left lung aeration with only mild residual left basilar atelectasis.      3.  Right perihilar atelectasis.      DX-CHEST-LIMITED (1 VIEW)   Final Result      1.  Endotracheal tube in right mainstem  bronchus.      2.  Complete atelectatic collapse of the left lung. Recommend pulling back endotracheal tube into the trachea.      3. Slightly aberrant course of esophageal thermometer which overlies the left hemithorax. Recommend repositioning.      1.  An Emergent Document Only message has been documented for NURIS CROFT in the [employer]  Critical Result system on 1/25/2021 10:26 AM, Message ID [id number].      MR-BRAIN-WITH & W/O   Final Result      1.  43 x 42 x 44 mm left parietal parafalcine vertex meningioma with involvement of the superior sagittal sinus and the parietal calvarium. There is prominent mass effect on the left parietal lobe with mild vasogenic edema.   2.  7 mm intra-axial lesion in the right frontoparietal region with blooming artifact on GRE and a couple additional tiny foci of blooming artifact in the right temporal lobe and left parietotemporal region. These probably represent small cavernous    malformations however follow-up is suggested or recommend comparison with old outside prior brain MRI studies.   3.  No acute infarct or intracranial hemorrhage.      DX-CHEST-PORTABLE (1 VIEW)   Final Result         1.  Basilar atelectasis versus early infiltrate      CT-HEAD W/O   Final Result         1.  Large calcified extra-axial mass at the left apex, appearance favors meningioma, there is significant mass effect on the left parietal lobe with surrounding mild vasogenic edema. Recommend follow-up characterization with MRI of the brain without and    with contrast.      These findings were discussed with the patient's clinician, Chang Haq, on 1/21/2021 10:24 PM.           Assessment/Plan  * Meningioma (HCC)- (present on admission)  Assessment & Plan  Neuro Surgery planning resection likely Friday  Cont decadron and Keppra  OK to Neuro surgical floor      Hypoxia  Assessment & Plan  Pt would appear to have had a R main stem intubation  Resolved and on RA ambulating the  unit  Cont to mobilize, IS, O2/RT protocols    Diabetes mellitus (HCC)  Assessment & Plan  A1c 9.7 is diagnostic of diabetes.  Also on high-dose of Decadron.  No acidosis on BMP  Beta hydroxybutyric acid negative  Start insulin sliding scale  Encourage fluid intake    Lab Results   Component Value Date/Time    HBA1C 9.7 (H) 01/24/2021 0325     Results from last 7 days   Lab Units 01/25/21  1049 01/25/21  0813 01/24/21  2056 01/24/21  1720 01/24/21  1214 01/24/21  0739 01/23/21  2339 01/23/21  2141   ACCU CHECK GLUCOSE 788 mg/dL 437* 452* 481* 373* 411* 357* 413* 540*     I have ordered insulin sliding scale with D50 and glucagon for hypoglycemia per protocol.  Diabetic diet  Diabetic education    Leukocytosis  Assessment & Plan  Trending down.  Patient is afebrile.  Does not endorse any symptoms concerning for infection  Negative procalcitonin.  Likely secondary to steroids.    Elevated blood sugar  Assessment & Plan  A1c 9.7  Likely new Dx DM  Titrate up lantus  Cont SSI coverage       VTE prophylaxis: pt is ambulatory

## 2021-01-26 NOTE — ASSESSMENT & PLAN NOTE
S/p seizure some desats, improved  Protecting airway  2 protocols  Symptom spirometry  Mobilize when clinically safe

## 2021-01-26 NOTE — ASSESSMENT & PLAN NOTE
Improved glucose control  He will poorly controlled, likely hemoglobin 9.7  Exacerbated by current steroid use, reduce steroids if okay with neurosurgery -> taper over 2 weeks  Dose Lantus daily as needed  Continue high sliding scale  Add Metformin back and advancing diet
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
## 2021-01-27 ENCOUNTER — APPOINTMENT (OUTPATIENT)
Dept: RADIOLOGY | Facility: MEDICAL CENTER | Age: 46
DRG: 025 | End: 2021-01-27
Attending: NURSE PRACTITIONER
Payer: COMMERCIAL

## 2021-01-27 PROBLEM — G93.9 BRAIN LESION: Status: ACTIVE | Noted: 2021-01-27

## 2021-01-27 PROBLEM — E87.1 HYPONATREMIA: Status: ACTIVE | Noted: 2021-01-27

## 2021-01-27 LAB
GLUCOSE BLD-MCNC: 242 MG/DL (ref 65–99)
GLUCOSE BLD-MCNC: 292 MG/DL (ref 65–99)
GLUCOSE BLD-MCNC: 299 MG/DL (ref 65–99)
GLUCOSE BLD-MCNC: 324 MG/DL (ref 65–99)
GLUCOSE BLD-MCNC: 362 MG/DL (ref 65–99)
GLUCOSE BLD-MCNC: 419 MG/DL (ref 65–99)
GLUCOSE BLD-MCNC: 419 MG/DL (ref 65–99)

## 2021-01-27 PROCEDURE — A9270 NON-COVERED ITEM OR SERVICE: HCPCS | Performed by: INTERNAL MEDICINE

## 2021-01-27 PROCEDURE — 82962 GLUCOSE BLOOD TEST: CPT

## 2021-01-27 PROCEDURE — 70544 MR ANGIOGRAPHY HEAD W/O DYE: CPT

## 2021-01-27 PROCEDURE — 770020 HCHG ROOM/CARE - TELE (206)

## 2021-01-27 PROCEDURE — 700102 HCHG RX REV CODE 250 W/ 637 OVERRIDE(OP): Performed by: INTERNAL MEDICINE

## 2021-01-27 PROCEDURE — A9270 NON-COVERED ITEM OR SERVICE: HCPCS | Performed by: STUDENT IN AN ORGANIZED HEALTH CARE EDUCATION/TRAINING PROGRAM

## 2021-01-27 PROCEDURE — 700102 HCHG RX REV CODE 250 W/ 637 OVERRIDE(OP): Performed by: STUDENT IN AN ORGANIZED HEALTH CARE EDUCATION/TRAINING PROGRAM

## 2021-01-27 PROCEDURE — 99232 SBSQ HOSP IP/OBS MODERATE 35: CPT | Mod: GC | Performed by: INTERNAL MEDICINE

## 2021-01-27 RX ORDER — INSULIN GLARGINE 100 [IU]/ML
30 INJECTION, SOLUTION SUBCUTANEOUS
Status: DISCONTINUED | OUTPATIENT
Start: 2021-01-27 | End: 2021-01-28

## 2021-01-27 RX ADMIN — LEVETIRACETAM 500 MG: 500 TABLET ORAL at 05:40

## 2021-01-27 RX ADMIN — INSULIN HUMAN 14 UNITS: 100 INJECTION, SOLUTION PARENTERAL at 11:31

## 2021-01-27 RX ADMIN — DEXAMETHASONE 10 MG: 4 TABLET ORAL at 11:30

## 2021-01-27 RX ADMIN — INSULIN HUMAN 10 UNITS: 100 INJECTION, SOLUTION PARENTERAL at 07:58

## 2021-01-27 RX ADMIN — Medication 1 G: at 05:40

## 2021-01-27 RX ADMIN — DOCUSATE SODIUM 50 MG AND SENNOSIDES 8.6 MG 2 TABLET: 8.6; 5 TABLET, FILM COATED ORAL at 17:04

## 2021-01-27 RX ADMIN — INSULIN HUMAN 7 UNITS: 100 INJECTION, SOLUTION PARENTERAL at 21:20

## 2021-01-27 RX ADMIN — DEXAMETHASONE 10 MG: 4 TABLET ORAL at 05:40

## 2021-01-27 RX ADMIN — DOCUSATE SODIUM 50 MG AND SENNOSIDES 8.6 MG 2 TABLET: 8.6; 5 TABLET, FILM COATED ORAL at 05:40

## 2021-01-27 RX ADMIN — ACETAMINOPHEN 650 MG: 325 TABLET, FILM COATED ORAL at 12:00

## 2021-01-27 RX ADMIN — INSULIN HUMAN 7 UNITS: 100 INJECTION, SOLUTION PARENTERAL at 17:04

## 2021-01-27 RX ADMIN — DEXAMETHASONE 10 MG: 4 TABLET ORAL at 17:04

## 2021-01-27 RX ADMIN — Medication 1 G: at 13:15

## 2021-01-27 RX ADMIN — INSULIN GLARGINE 30 UNITS: 100 INJECTION, SOLUTION SUBCUTANEOUS at 11:31

## 2021-01-27 RX ADMIN — LEVETIRACETAM 500 MG: 500 TABLET ORAL at 17:04

## 2021-01-27 RX ADMIN — Medication 1 G: at 21:21

## 2021-01-27 RX ADMIN — DEXAMETHASONE 10 MG: 4 TABLET ORAL at 00:13

## 2021-01-27 ASSESSMENT — ENCOUNTER SYMPTOMS
NAUSEA: 0
SORE THROAT: 0
CHILLS: 0
HEADACHES: 1
PALPITATIONS: 0
BACK PAIN: 0
DIARRHEA: 0
COUGH: 0
SHORTNESS OF BREATH: 0
BLURRED VISION: 0
FEVER: 0
LOSS OF CONSCIOUSNESS: 0
DOUBLE VISION: 0
DIZZINESS: 0
ABDOMINAL PAIN: 0
VOMITING: 0

## 2021-01-27 ASSESSMENT — PATIENT HEALTH QUESTIONNAIRE - PHQ9
4. FEELING TIRED OR HAVING LITTLE ENERGY: NOT AT ALL
5. POOR APPETITE OR OVEREATING: NOT AT ALL
2. FEELING DOWN, DEPRESSED, IRRITABLE, OR HOPELESS: NOT AT ALL
9. THOUGHTS THAT YOU WOULD BE BETTER OFF DEAD, OR OF HURTING YOURSELF: NOT AT ALL
SUM OF ALL RESPONSES TO PHQ9 QUESTIONS 1 AND 2: 0
6. FEELING BAD ABOUT YOURSELF - OR THAT YOU ARE A FAILURE OR HAVE LET YOURSELF OR YOUR FAMILY DOWN: NOT AL ALL
SUM OF ALL RESPONSES TO PHQ QUESTIONS 1-9: 0
1. LITTLE INTEREST OR PLEASURE IN DOING THINGS: NOT AT ALL
3. TROUBLE FALLING OR STAYING ASLEEP OR SLEEPING TOO MUCH: NOT AT ALL
7. TROUBLE CONCENTRATING ON THINGS, SUCH AS READING THE NEWSPAPER OR WATCHING TELEVISION: NOT AT ALL
8. MOVING OR SPEAKING SO SLOWLY THAT OTHER PEOPLE COULD HAVE NOTICED. OR THE OPPOSITE, BEING SO FIGETY OR RESTLESS THAT YOU HAVE BEEN MOVING AROUND A LOT MORE THAN USUAL: NOT AT ALL

## 2021-01-27 ASSESSMENT — FIBROSIS 4 INDEX: FIB4 SCORE: 0.44

## 2021-01-27 ASSESSMENT — PAIN DESCRIPTION - PAIN TYPE: TYPE: ACUTE PAIN

## 2021-01-27 NOTE — CARE PLAN
Problem: Safety  Goal: Will remain free from injury  Outcome: PROGRESSING AS EXPECTED     Problem: Neuro Status  Goal: Monitor neuro status and rapid identification of neuro changes  Outcome: PROGRESSING AS EXPECTED     Problem: Craniotomy surgery  Goal: Post op care of the craniotomy patient  Outcome: PROGRESSING SLOWER THAN EXPECTED

## 2021-01-27 NOTE — THERAPY
Occupational Therapy Contact Note     Patient Name: Marni Muñoz  Age:  45 y.o., Sex:  female  Medical Record #: [id number]  Today's Date: 1/27/2021 01/27/21 0849   Interdisciplinary Plan of Care Collaboration   Collaboration Comments Pt awaiting surgical intervention for meningioma, currently at her baseline of functional independence. Will hold OT evaluation until post-op to assess for new functional deficits and provide equipment recommendations as appropriate.

## 2021-01-27 NOTE — PROGRESS NOTES
Patient arrived to floor via wheelchair. Transferred to bed with no assistance. AO x 4. On room air. Patient denies pain, just occasional pressure in head. 2 RN skin check complete with JORDAN Jackson. Patient oriented to unit. All questions answered. No current complaints at this time. Will continue to monitor.

## 2021-01-27 NOTE — ASSESSMENT & PLAN NOTE
MRI this admission:  7 mm intra-axial lesion in the right frontoparietal region with blooming artifact on GRE and a couple additional tiny foci of blooming artifact in the right temporal lobe and left parietotemporal region. These probably represent small cavernous   malformations however follow-up is suggested or recommend comparison with old outside prior brain MRI studies.

## 2021-01-27 NOTE — CARE PLAN
Problem: Communication  Goal: The ability to communicate needs accurately and effectively will improve  Outcome: PROGRESSING AS EXPECTED     Problem: Safety  Goal: Will remain free from injury  Outcome: PROGRESSING AS EXPECTED  Goal: Will remain free from falls  Outcome: PROGRESSING AS EXPECTED     Problem: Infection  Goal: Will remain free from infection  Outcome: PROGRESSING AS EXPECTED     Problem: Venous Thromboembolism (VTW)/Deep Vein Thrombosis (DVT) Prevention:  Goal: Patient will participate in Venous Thrombosis (VTE)/Deep Vein Thrombosis (DVT)Prevention Measures  Outcome: PROGRESSING AS EXPECTED     Problem: Bowel/Gastric:  Goal: Normal bowel function is maintained or improved  Outcome: PROGRESSING AS EXPECTED  Goal: Will not experience complications related to bowel motility  Outcome: PROGRESSING AS EXPECTED     Problem: Knowledge Deficit  Goal: Knowledge of disease process/condition, treatment plan, diagnostic tests, and medications will improve  Outcome: PROGRESSING AS EXPECTED  Goal: Knowledge of the prescribed therapeutic regimen will improve  Outcome: PROGRESSING AS EXPECTED     Problem: Discharge Barriers/Planning  Goal: Patient's continuum of care needs will be met  Outcome: PROGRESSING AS EXPECTED     Problem: Pain Management  Goal: Pain level will decrease to patient's comfort goal  Outcome: PROGRESSING AS EXPECTED     Problem: Psychosocial Needs:  Goal: Level of anxiety will decrease  Outcome: PROGRESSING AS EXPECTED     Problem: Respiratory:  Goal: Respiratory status will improve  Outcome: PROGRESSING AS EXPECTED     Problem: Craniotomy surgery  Goal: Post op care of the craniotomy patient  Outcome: PROGRESSING AS EXPECTED     Problem: Neuro Status  Goal: Monitor neuro status and rapid identification of neuro changes  Outcome: PROGRESSING AS EXPECTED     Problem: Mobility  Goal: Risk for activity intolerance will decrease  Outcome: PROGRESSING AS EXPECTED

## 2021-01-27 NOTE — PROGRESS NOTES
Neurosurgery Progress Note    Subjective:  Surgery cancelled for inability to oxygenate - rt mainstem intubation  No event over night   Feeling great    Exam:  A&O x3, GCS 15  PERRL, EOMI  Face symm, tongue midline  HOLM with FS, no drift      BP  Min: 95/51  Max: 124/70  Pulse  Av.8  Min: 67  Max: 93  Resp  Av.5  Min: 14  Max: 39  Temp  Av.6 °C (97.9 °F)  Min: 36.5 °C (97.7 °F)  Max: 36.8 °C (98.3 °F)  SpO2  Av.4 %  Min: 91 %  Max: 100 %    No data recorded    Recent Labs     21  0424   WBC 11.5*   RBC 4.65   HEMOGLOBIN 14.5   HEMATOCRIT 42.7   MCV 91.8   MCH 31.2   MCHC 34.0   RDW 40.3   PLATELETCT 307   MPV 10.6     Recent Labs     21  0424   SODIUM 131*   POTASSIUM 4.0   CHLORIDE 96   CO2 25   GLUCOSE 300*   BUN 17   CREATININE 0.59   CALCIUM 9.0               Intake/Output       21 0700 - 21 0659 21 0700 - 21 0659      [id number] 8834-1461 Total [id number] 9835-4471 Total       Intake    P.O.  1600  -- 1600  --  -- --    P.O. 1600 -- 1600 -- -- --    Total Intake 1600 -- 1600 -- -- --       Output    Urine  190  -- 190  --  -- --    Number of Times Voided 1 x 1 x 2 x -- -- --    Output (mL) ([REMOVED] Urethral Catheter Latex 16 Fr.) 190 -- 190 -- -- --    Total Output 190 -- 190 -- -- --       Net I/O     1410 -- 1410 -- -- --            Intake/Output Summary (Last 24 hours) at 2021 0927  Last data filed at 2021 1526  Gross per 24 hour   Intake 1400 ml   Output 100 ml   Net 1300 ml            • insulin glargine  30 Units Q EVENING   • sodium chloride  1 g Q8HRS   • insulin regular  3-14 Units 4X/DAY ACHS    And   • glucose  16 g Q15 MIN PRN    And   • dextrose 50%  50 mL Q15 MIN PRN   • dexamethasone  10 mg Q6HRS   • diphenhydrAMINE  25 mg HS PRN   • senna-docusate  2 Tab BID    And   • polyethylene glycol/lytes  1 Packet QDAY PRN    And   • magnesium hydroxide  30 mL QDAY PRN    And   • bisacodyl  10 mg QDAY PRN   • acetaminophen  650 mg Q6HRS PRN    • ondansetron  4 mg Q4HRS PRN   • ondansetron  4 mg Q4HRS PRN   • promethazine  12.5-25 mg Q4HRS PRN   • promethazine  12.5-25 mg Q4HRS PRN   • prochlorperazine  5-10 mg Q4HRS PRN   • levETIRAcetam  500 mg BID       Assessment and Plan:  Hospital day #6 large left extraaxial calcified mass c/w meningioma sig mass effect  POD #na  Prophylactic anticoagulation: hold         Start date/time: pt ambulatory    keppra 500 bid  Dec 10Q6  Neuro checks Q4  Hyperglycemia - on lantus, regular insulin  OR Friday 1/29

## 2021-01-27 NOTE — THERAPY
Missed Therapy     Patient Name: Marni Muñoz  Age:  45 y.o., Sex:  female  Medical Record #: [id number]  Today's Date: 1/27/2021 01/27/21 0846   Interdisciplinary Plan of Care Collaboration   IDT Collaboration with  Nursing   Collaboration Comments PT eval on hold. Pt having surgical intervention later this week. PT will see pt post-operatively as appropriate.

## 2021-01-27 NOTE — PROGRESS NOTES
"Internal Medicine - Daily Progress Note     Date of Service: 1/27/2021  Primary Team: Green Team  Attending: Dr. Janene Burns M.D.  Senior Resident: Dr. Princess Pollack  Intern: Rene Gonzalez M.D.  Contact: [phone]      Previous 24 Hours  No acute events overnight.  This morning, patient denied any shortness of breath, cough, wheezing.  Headache has resolved but patient still has some residual frontal forehead pressure.  Says she initially came in with left eye photophobia and right eye \"Sepia filter\" visual changes, blurry vision, double vision which have all resolved except photophobia.  Eating, drinking, stooling, ambulating without difficulty.  Denied N/V.    Consultants/Specialty:  Neurosurgery    Review of Systems   Review of Systems   Constitutional: Negative for chills and fever.   HENT: Negative for nosebleeds and sore throat.    Eyes: Negative for blurred vision and double vision.   Respiratory: Negative for cough and shortness of breath.    Cardiovascular: Negative for chest pain, palpitations and leg swelling.   Gastrointestinal: Negative for abdominal pain, diarrhea, nausea and vomiting.   Genitourinary: Negative for dysuria and urgency.   Musculoskeletal: Negative for back pain.   Skin: Negative for rash.   Neurological: Positive for headaches. Negative for dizziness and loss of consciousness.       Vitals   Temp:  [36.5 °C (97.7 °F)-36.8 °C (98.3 °F)] 36.6 °C (97.8 °F)  Pulse:  [64-93] 68  Resp:  [10-39] 18  BP: ()/(51-77) 95/51  SpO2:  [91 %-100 %] 96 %        Physical Exam  Physical Exam  Vitals signs reviewed.   Constitutional:       General: She is not in acute distress.     Appearance: Normal appearance. She is well-developed. She is not diaphoretic.   HENT:      Head: Normocephalic and atraumatic.      Comments: shaved  Neck:      Musculoskeletal: Neck supple. No neck rigidity.      Vascular: No JVD.   Cardiovascular:      Rate and Rhythm: Normal rate and regular rhythm.   Pulmonary:     "  Effort: Pulmonary effort is normal. No respiratory distress.      Breath sounds: No stridor. No wheezing or rales.   Abdominal:      Palpations: Abdomen is soft.      Tenderness: There is no abdominal tenderness. There is no guarding or rebound.   Musculoskeletal:         General: No tenderness.      Right lower leg: No edema.      Left lower leg: No edema.   Skin:     General: Skin is warm and dry.      Findings: No rash.   Neurological:      General: No focal deficit present.      Mental Status: She is alert and oriented to person, place, and time.      Comments: PERRL, CN2-12 grossly intact, moving all 4 spontaneously, motor and sensation to light touch grossly intact in all 4, normal gait   Psychiatric:         Mood and Affect: Mood normal.         Thought Content: Thought content normal.         Labs  Labs reviewed. Relevant findings below.    Recent Labs     01/26/21  1804 01/26/21 2128 01/27/21  0222   POCGLUCOSE 419* 362* 242*         Imaging   I have reviewed the relevant diagnostic imaging.      Microbiology  I have reviewed the relevant microbiology.      Problem Representation  Marni Muñoz is a 45 y.o. female with a PMHx significant for recurrent migraines since age 16 admitted on 1/21/2021 with ~4.5 cm diameter left parietal calvarium with prominent mass effect on left parietal lobe. Patient was originally taken to OR for resection on 1/25/2021 but was cancelled because she became hypoxic with CXR showing right mainstem intubation requiring repeat intubation and ICU admission. Patient now on floor comfortable on room air pending meningioma resection rescheduled for 1/29/2021. DC'd telemetry; patient ok for neurosurgery floor.    Assessment and Plan  * Meningioma (HCC)- (present on admission)  Assessment & Plan  Neuro Surgery planning resection 1/29/2021  Cont decadron and Keppra  OK to Neuro surgical floor      Hypoxia  Assessment & Plan  Original surgery cancelled; CXR showed R main stem  intubation  Resolved and on RA ambulating the unit  Cont to mobilize, IS, O2/RT protocols    Diabetes mellitus (HCC)  Assessment & Plan  A1c 9.7 is diagnostic of diabetes.  Also on high-dose of Decadron.  No acidosis on BMP  Beta hydroxybutyric acid negative  Start insulin sliding scale  Encourage fluid intake  I have ordered insulin sliding scale with D50 and glucagon for hypoglycemia per protocol.  Diabetic diet  Diabetic education  30 u glargine at noon    Brain lesion  Assessment & Plan  MRI this admission:  7 mm intra-axial lesion in the right frontoparietal region with blooming artifact on GRE and a couple additional tiny foci of blooming artifact in the right temporal lobe and left parietotemporal region. These probably represent small cavernous   malformations however follow-up is suggested or recommend comparison with old outside prior brain MRI studies.    Hyponatremia  Assessment & Plan  - continue salt tabs  - monitor BMP  - keep wnl to optimize for surgery/anesthesia    Atelectasis  Assessment & Plan  Improving.  Seen on CXR this admission.  On room air. Lungs CTAB.    - continue IS    Obesity (BMI 30.0-34.9)  Assessment & Plan  Body mass index is 34.69 kg/m².    -Counseled at least 5 minutes on 01/27/21. Strongly encouraged to lose weight and to diet/exercise after she recovers from surgery.      Leukocytosis  Assessment & Plan  Trending down.  Patient is afebrile.  Does not endorse any symptoms concerning for infection  Negative procalcitonin.  Likely secondary to steroids.    Elevated blood sugar  Assessment & Plan  A1c 9.7  Likely new Dx DM  Titrate up lantus  Cont SSI coverage        Medications  Scheduled:    •  insulin glargine, 30 Units, Subcutaneous, Q EVENING    •  sodium chloride, 1 g, Oral, Q8HRS    •  insulin regular, 3-14 Units, Subcutaneous, 4X/DAY ACHS    •  dexamethasone, 10 mg, Oral, Q6HRS    •  senna-docusate, 2 Tab, Oral, BID    •  levETIRAcetam, 500 mg, Oral, BID      PRN:  insulin  regular **AND** POC Blood Glucose **AND** NOTIFY MD and PharmD **AND** glucose **AND** dextrose 50%, diphenhydrAMINE, senna-docusate **AND** polyethylene glycol/lytes **AND** magnesium hydroxide **AND** bisacodyl, acetaminophen, ondansetron, ondansetron, promethazine, promethazine, prochlorperazine    Current Outpatient Medications   Medication Instructions   • acetaminophen (TYLENOL) 1,000 mg, Oral, 2 TIMES DAILY PRN   • Ascorbic Acid (VITAMIN C) 1000 MG Tab 1 Tab, Oral, DAILY   • azithromycin (ZITHROMAX) 250 MG Tab Take as directed on package. Dispense one package.   • ibuprofen (MOTRIN) 800 mg, Oral, 2 TIMES DAILY PRN   • Multiple Vitamins-Minerals (OCUVITE PO) 1 Tab, Oral, DAILY   • Phenyleph-Doxylamine-DM-APAP (TYLENOL COLD/FLU/COUGH NIGHT) 5-6.25- MG/15ML Liquid 15 mL, Oral, NIGHTLY PRN   • Prenatal MV-Min-Fe Fum-FA-DHA (PRENATAL 1 PO) 1 Tab, Oral, DAILY       [facility] is currently operating under crisis standards of care due to the COVID-19 pandemic.    Rene Gonzalez M.D.  Internal Medicine PGY-1

## 2021-01-28 LAB
ABO GROUP BLD: NORMAL
ANION GAP SERPL CALC-SCNC: 10 MMOL/L (ref 7–16)
BLD GP AB SCN SERPL QL: NORMAL
BUN SERPL-MCNC: 19 MG/DL (ref 8–22)
CALCIUM SERPL-MCNC: 8.9 MG/DL (ref 8.5–10.5)
CHLORIDE SERPL-SCNC: 97 MMOL/L (ref 96–112)
CO2 SERPL-SCNC: 25 MMOL/L (ref 20–33)
CREAT SERPL-MCNC: 0.54 MG/DL (ref 0.5–1.4)
GLUCOSE BLD-MCNC: 299 MG/DL (ref 65–99)
GLUCOSE BLD-MCNC: 316 MG/DL (ref 65–99)
GLUCOSE BLD-MCNC: 330 MG/DL (ref 65–99)
GLUCOSE BLD-MCNC: 340 MG/DL (ref 65–99)
GLUCOSE SERPL-MCNC: 263 MG/DL (ref 65–99)
POTASSIUM SERPL-SCNC: 3.7 MMOL/L (ref 3.6–5.5)
RH BLD: NORMAL
SODIUM SERPL-SCNC: 132 MMOL/L (ref 135–145)

## 2021-01-28 PROCEDURE — 700102 HCHG RX REV CODE 250 W/ 637 OVERRIDE(OP): Performed by: STUDENT IN AN ORGANIZED HEALTH CARE EDUCATION/TRAINING PROGRAM

## 2021-01-28 PROCEDURE — 700102 HCHG RX REV CODE 250 W/ 637 OVERRIDE(OP): Performed by: INTERNAL MEDICINE

## 2021-01-28 PROCEDURE — 86900 BLOOD TYPING SEROLOGIC ABO: CPT

## 2021-01-28 PROCEDURE — A9270 NON-COVERED ITEM OR SERVICE: HCPCS | Performed by: CLINICAL NURSE SPECIALIST

## 2021-01-28 PROCEDURE — 82962 GLUCOSE BLOOD TEST: CPT | Mod: 91

## 2021-01-28 PROCEDURE — 86901 BLOOD TYPING SEROLOGIC RH(D): CPT

## 2021-01-28 PROCEDURE — 770006 HCHG ROOM/CARE - MED/SURG/GYN SEMI*

## 2021-01-28 PROCEDURE — A9270 NON-COVERED ITEM OR SERVICE: HCPCS | Performed by: STUDENT IN AN ORGANIZED HEALTH CARE EDUCATION/TRAINING PROGRAM

## 2021-01-28 PROCEDURE — 86850 RBC ANTIBODY SCREEN: CPT

## 2021-01-28 PROCEDURE — A9270 NON-COVERED ITEM OR SERVICE: HCPCS | Performed by: INTERNAL MEDICINE

## 2021-01-28 PROCEDURE — 99232 SBSQ HOSP IP/OBS MODERATE 35: CPT | Mod: GC | Performed by: INTERNAL MEDICINE

## 2021-01-28 PROCEDURE — 80048 BASIC METABOLIC PNL TOTAL CA: CPT

## 2021-01-28 PROCEDURE — 700102 HCHG RX REV CODE 250 W/ 637 OVERRIDE(OP): Performed by: CLINICAL NURSE SPECIALIST

## 2021-01-28 RX ORDER — POTASSIUM CHLORIDE 20 MEQ/1
40 TABLET, EXTENDED RELEASE ORAL ONCE
Status: DISPENSED | OUTPATIENT
Start: 2021-01-28 | End: 2021-01-29

## 2021-01-28 RX ORDER — INSULIN GLARGINE 100 [IU]/ML
35 INJECTION, SOLUTION SUBCUTANEOUS
Status: DISCONTINUED | OUTPATIENT
Start: 2021-01-29 | End: 2021-01-30

## 2021-01-28 RX ORDER — DEXTROSE MONOHYDRATE 25 G/50ML
50 INJECTION, SOLUTION INTRAVENOUS
Status: DISCONTINUED | OUTPATIENT
Start: 2021-01-28 | End: 2021-02-01

## 2021-01-28 RX ADMIN — INSULIN LISPRO 10 UNITS: 100 INJECTION, SOLUTION INTRAVENOUS; SUBCUTANEOUS at 20:31

## 2021-01-28 RX ADMIN — INSULIN LISPRO 10 UNITS: 100 INJECTION, SOLUTION INTRAVENOUS; SUBCUTANEOUS at 17:35

## 2021-01-28 RX ADMIN — LEVETIRACETAM 500 MG: 500 TABLET ORAL at 05:02

## 2021-01-28 RX ADMIN — DEXAMETHASONE 10 MG: 4 TABLET ORAL at 17:35

## 2021-01-28 RX ADMIN — DIPHENHYDRAMINE HYDROCHLORIDE 25 MG: 25 TABLET ORAL at 00:04

## 2021-01-28 RX ADMIN — DIPHENHYDRAMINE HYDROCHLORIDE 25 MG: 25 TABLET ORAL at 22:57

## 2021-01-28 RX ADMIN — Medication 1 G: at 12:01

## 2021-01-28 RX ADMIN — ACETAMINOPHEN 650 MG: 325 TABLET, FILM COATED ORAL at 22:57

## 2021-01-28 RX ADMIN — DEXAMETHASONE 10 MG: 4 TABLET ORAL at 00:04

## 2021-01-28 RX ADMIN — INSULIN LISPRO 6 UNITS: 100 INJECTION, SOLUTION INTRAVENOUS; SUBCUTANEOUS at 17:40

## 2021-01-28 RX ADMIN — DEXAMETHASONE 10 MG: 4 TABLET ORAL at 22:57

## 2021-01-28 RX ADMIN — INSULIN HUMAN 10 UNITS: 100 INJECTION, SOLUTION PARENTERAL at 12:01

## 2021-01-28 RX ADMIN — INSULIN GLARGINE 30 UNITS: 100 INJECTION, SOLUTION SUBCUTANEOUS at 12:02

## 2021-01-28 RX ADMIN — Medication 1 G: at 05:02

## 2021-01-28 RX ADMIN — DOCUSATE SODIUM 50 MG AND SENNOSIDES 8.6 MG 2 TABLET: 8.6; 5 TABLET, FILM COATED ORAL at 05:02

## 2021-01-28 RX ADMIN — MAGNESIUM CITRATE 148 ML: 1.75 LIQUID ORAL at 11:02

## 2021-01-28 RX ADMIN — DEXAMETHASONE 10 MG: 4 TABLET ORAL at 12:01

## 2021-01-28 RX ADMIN — DEXAMETHASONE 10 MG: 4 TABLET ORAL at 05:02

## 2021-01-28 RX ADMIN — INSULIN HUMAN 7 UNITS: 100 INJECTION, SOLUTION PARENTERAL at 08:41

## 2021-01-28 RX ADMIN — Medication 1 G: at 20:31

## 2021-01-28 RX ADMIN — LEVETIRACETAM 500 MG: 500 TABLET ORAL at 17:35

## 2021-01-28 ASSESSMENT — ENCOUNTER SYMPTOMS
DIARRHEA: 0
SORE THROAT: 0
HEADACHES: 1
DIZZINESS: 0
PALPITATIONS: 0
CHILLS: 0
FEVER: 0
COUGH: 0
ABDOMINAL PAIN: 0
SHORTNESS OF BREATH: 0
BLURRED VISION: 0
LOSS OF CONSCIOUSNESS: 0
VOMITING: 0
BACK PAIN: 0
DOUBLE VISION: 0
NAUSEA: 0

## 2021-01-28 ASSESSMENT — FIBROSIS 4 INDEX: FIB4 SCORE: 0.44

## 2021-01-28 NOTE — PROGRESS NOTES
Neurosurgery Progress Note    Subjective:  Surgery cancelled for inability to oxygenate - rt mainstem intubation  No event over night   Feeling great, ready for surgery mike  Eating, amb, voiding    Exam:  A&O x3, GCS 15  PERRL, EOMI  Face symm, tongue midline  HOLM with FS, no drift      BP  Min: 100/62  Max: 111/63  Pulse  Av.3  Min: 65  Max: 75  Resp  Av.7  Min: 17  Max: 18  Temp  Av.4 °C (97.5 °F)  Min: 36.2 °C (97.2 °F)  Max: 36.6 °C (97.8 °F)  SpO2  Av.3 %  Min: 94 %  Max: 97 %    No data recorded    Recent Labs     21   WBC 11.5*   RBC 4.65   HEMOGLOBIN 14.5   HEMATOCRIT 42.7   MCV 91.8   MCH 31.2   MCHC 34.0   RDW 40.3   PLATELETCT 307   MPV 10.6     Recent Labs     21  0424 21  0102   SODIUM 131* 132*   POTASSIUM 4.0 3.7   CHLORIDE 96 97   CO2 25 25   GLUCOSE 300* 263*   BUN 17 19   CREATININE 0.59 0.54   CALCIUM 9.0 8.9               Intake/Output       21 07 - 21 0659 21 - 21 0659      [id number] 6932-8061 Total 0571-1901 4959-8935 Total       Intake    P.O.  100  360 460  --  -- --    P.O. 100 360 460 -- -- --    Total Intake 100 360 460 -- -- --       Output    Urine  --  -- --  --  -- --    Number of Times Voided -- 1 x 1 x -- -- --    Total Output -- -- -- -- -- --       Net I/O     100 360 460 -- -- --            Intake/Output Summary (Last 24 hours) at 2021 1015  Last data filed at 2021  Gross per 24 hour   Intake 460 ml   Output --   Net 460 ml            • potassium chloride SA  40 mEq Once   • insulin glargine  30 Units DAILY AT NOON   • sodium chloride  1 g Q8HRS   • insulin regular  3-14 Units 4X/DAY ACHS    And   • glucose  16 g Q15 MIN PRN    And   • dextrose 50%  50 mL Q15 MIN PRN   • dexamethasone  10 mg Q6HRS   • diphenhydrAMINE  25 mg HS PRN   • senna-docusate  2 Tab BID    And   • polyethylene glycol/lytes  1 Packet QDAY PRN    And   • magnesium hydroxide  30 mL QDAY PRN    And   • bisacodyl  10 mg  QDAY PRN   • acetaminophen  650 mg Q6HRS PRN   • ondansetron  4 mg Q4HRS PRN   • ondansetron  4 mg Q4HRS PRN   • promethazine  12.5-25 mg Q4HRS PRN   • promethazine  12.5-25 mg Q4HRS PRN   • prochlorperazine  5-10 mg Q4HRS PRN   • levETIRAcetam  500 mg BID       Assessment and Plan:  Hospital day #7 large left extraaxial calcified mass c/w meningioma sig mass effect  POD #na  Prophylactic anticoagulation: hold         Start date/time: pt ambulatory    keppra 500 bid  Dec 10Q6  Neuro checks Q4  Hyperglycemia - on lantus, regular insulin  Bowel protocol- no BM, + flatus, will give mag citrate  OR tomorrow am  NPO at [place]  Labs ok- check cbc in am, COD in am

## 2021-01-28 NOTE — PROGRESS NOTES
Internal Medicine - Daily Progress Note     Date of Service: 1/28/2021  Primary Team: Green Team  Attending: Dr. Janene Burns M.D.  Senior Resident: Dr. Princess Pollack  Intern: Rene Gonzalez M.D.  Contact: [phone]      Interval events:  1/25- taken to OR for meningioma resection on 1/25/2021 but was cancelled because she became hypoxic on ventilator with CXR showing right mainstem intubation requiring repeat intubation and ICU admission.  1/26- transferred out of ICU  1/27- glargine increased to 30 u daily at noon  1/28- glargine increased to 35 u and added 6 u premeal    Previous 24 Hours  No acute events overnight.  Patient feels unchanged from yesterday.  No complaints at this time.  Still having some forehead pressure.  During rounds, said her personal ice pack was lost.      Consultants/Specialty:  Neurosurgery    Review of Systems   Review of Systems   Constitutional: Negative for chills and fever.   HENT: Negative for nosebleeds and sore throat.    Eyes: Negative for blurred vision and double vision.   Respiratory: Negative for cough and shortness of breath.    Cardiovascular: Negative for chest pain, palpitations and leg swelling.   Gastrointestinal: Negative for abdominal pain, diarrhea, nausea and vomiting.   Genitourinary: Negative for dysuria and urgency.   Musculoskeletal: Negative for back pain.   Skin: Negative for rash.   Neurological: Positive for headaches. Negative for dizziness and loss of consciousness.       Vitals   Temp:  [36.2 °C (97.2 °F)-36.8 °C (98.3 °F)] 36.2 °C (97.2 °F)  Pulse:  [65-75] 65  Resp:  [17-18] 17  BP: ()/(53-63) 111/63  SpO2:  [91 %-97 %] 97 %        Physical Exam  Physical Exam  Vitals signs reviewed.   Constitutional:       General: She is not in acute distress.     Appearance: Normal appearance. She is well-developed. She is not diaphoretic.   HENT:      Head: Normocephalic and atraumatic.      Comments: shaved  Neck:      Musculoskeletal: Neck supple. No neck  rigidity.      Vascular: No JVD.   Cardiovascular:      Rate and Rhythm: Normal rate and regular rhythm.   Pulmonary:      Effort: Pulmonary effort is normal. No respiratory distress.      Breath sounds: No stridor. No wheezing or rales.   Abdominal:      Palpations: Abdomen is soft.      Tenderness: There is no abdominal tenderness. There is no guarding or rebound.   Musculoskeletal:         General: No tenderness.      Right lower leg: No edema.      Left lower leg: No edema.   Skin:     General: Skin is warm and dry.      Findings: No rash.   Neurological:      General: No focal deficit present.      Mental Status: She is alert and oriented to person, place, and time.      Comments: PERRL, CN2-12 grossly intact, moving all 4 spontaneously, motor and sensation to light touch grossly intact in all 4, normal gait   Psychiatric:         Mood and Affect: Mood normal.         Thought Content: Thought content normal.         Labs  Labs reviewed. Relevant findings below.    Recent Labs     01/26/21  0424 01/28/21  0102   SODIUM 131* 132*   POTASSIUM 4.0 3.7   CHLORIDE 96 97   CO2 25 25   BUN 17 19   CREATININE 0.59 0.54   GLUCOSE 300* 263*   CALCIUM 9.0 8.9   MAGNESIUM 2.3  --    PHOSPHORUS 3.5  --    IFAFRICA >60 >60   IFNOTAFR >60 >60     Recent Labs     01/27/21  1121 01/27/21  1651 01/27/21  2119   POCGLUCOSE 419* 299* 292*       Imaging   I have reviewed the relevant diagnostic imaging.      Microbiology  I have reviewed the relevant microbiology.      Problem Representation  Marni Muñoz is a 45 y.o. female with a PMHx significant for recurrent migraines since age 16 admitted on 1/21/2021 with ~4.5 cm diameter left parietal calvarium with prominent mass effect on left parietal lobe. Patient was originally taken to OR for resection on 1/25/2021 but was cancelled because she became hypoxic with CXR showing right mainstem intubation requiring repeat intubation and ICU admission. Patient now on floor comfortable on  room air pending meningioma resection rescheduled for 1/29/2021. NPO at MN. DC'd telemetry; patient ok for neurosurgery floor.    Assessment and Plan  * Meningioma (HCC)- (present on admission)  Assessment & Plan  Neuro Surgery planning resection 1/29/2021  Cont decadron and Keppra  OK to Neuro surgical floor      Hypoxia  Assessment & Plan  Original surgery cancelled; CXR showed R main stem intubation  Resolved and on RA ambulating the unit  Cont to mobilize, IS, O2/RT protocols    Diabetes mellitus (HCC)  Assessment & Plan  A1c 9.7 is diagnostic of diabetes.  Also on high-dose of Decadron.  No acidosis on BMP  Beta hydroxybutyric acid negative  Encourage fluid intake  I have ordered insulin sliding scale with D50 and glucagon for hypoglycemia per protocol.  Diabetic diet  Diabetic education  35 u glargine daily at noon  6 u lispro premeal TID  SSI    Brain lesion  Assessment & Plan  MRI this admission:  7 mm intra-axial lesion in the right frontoparietal region with blooming artifact on GRE and a couple additional tiny foci of blooming artifact in the right temporal lobe and left parietotemporal region. These probably represent small cavernous   malformations however follow-up is suggested or recommend comparison with old outside prior brain MRI studies.    Hyponatremia  Assessment & Plan  - continue salt tabs  - monitor BMP  - keep wnl to optimize for surgery/anesthesia    Atelectasis  Assessment & Plan  Improving.  Seen on CXR this admission.  On room air. Lungs CTAB.    - continue IS    Obesity (BMI 30.0-34.9)  Assessment & Plan  Body mass index is 34.69 kg/m².    -Counseled at least 5 minutes on 01/27/21. Strongly encouraged to lose weight and to diet/exercise after she recovers from surgery.      Leukocytosis  Assessment & Plan  Trending down.  Patient is afebrile.  Does not endorse any symptoms concerning for infection  Negative procalcitonin.  Likely secondary to steroids.    Elevated blood  sugar  Assessment & Plan  A1c 9.7  Likely new Dx DM  Titrate up lantus  Cont SSI coverage        Medications  Scheduled:    •  potassium chloride SA, 40 mEq, Oral, Once    •  [START ON 1/29/2021] insulin glargine, 35 Units, Subcutaneous, DAILY AT NOON    •  insulin lispro, 0.2 Units/kg/day, Subcutaneous, TID AC    •  sodium chloride, 1 g, Oral, Q8HRS    •  insulin regular, 3-14 Units, Subcutaneous, 4X/DAY ACHS    •  dexamethasone, 10 mg, Oral, Q6HRS    •  senna-docusate, 2 Tab, Oral, BID    •  levETIRAcetam, 500 mg, Oral, BID      PRN:  insulin regular **AND** POC Blood Glucose **AND** NOTIFY MD and PharmD **AND** glucose **AND** dextrose 50%, diphenhydrAMINE, senna-docusate **AND** polyethylene glycol/lytes **AND** magnesium hydroxide **AND** bisacodyl, acetaminophen, ondansetron, ondansetron, promethazine, promethazine, prochlorperazine    Current Outpatient Medications   Medication Instructions   • acetaminophen (TYLENOL) 1,000 mg, Oral, 2 TIMES DAILY PRN   • Ascorbic Acid (VITAMIN C) 1000 MG Tab 1 Tab, Oral, DAILY   • azithromycin (ZITHROMAX) 250 MG Tab Take as directed on package. Dispense one package.   • ibuprofen (MOTRIN) 800 mg, Oral, 2 TIMES DAILY PRN   • Multiple Vitamins-Minerals (OCUVITE PO) 1 Tab, Oral, DAILY   • Phenyleph-Doxylamine-DM-APAP (TYLENOL COLD/FLU/COUGH NIGHT) 5-6.25- MG/15ML Liquid 15 mL, Oral, NIGHTLY PRN   • Prenatal MV-Min-Fe Fum-FA-DHA (PRENATAL 1 PO) 1 Tab, Oral, DAILY       [facility] is currently operating under crisis standards of care due to the COVID-19 pandemic.    Rene Gonzalez M.D.  Internal Medicine PGY-1

## 2021-01-28 NOTE — PROGRESS NOTES
Bedside report received from JORDAN Mendieta. Family at bedside. POC discussed with pt; all questions answered at this time. No complaints of pain at this time. Fall precautions in place. Call light within reach.

## 2021-01-28 NOTE — DISCHARGE PLANNING
Anticipated Discharge Disposition: TBD    Action: Per MD, patient will be having neurosurgery Friday. Discharge needs currently unknown.    Barriers to Discharge: TBD    Plan: Case coordination to f/ with treatment team for discharge planning needs

## 2021-01-29 ENCOUNTER — ANESTHESIA EVENT (OUTPATIENT)
Dept: SURGERY | Facility: MEDICAL CENTER | Age: 46
DRG: 025 | End: 2021-01-29
Payer: COMMERCIAL

## 2021-01-29 ENCOUNTER — APPOINTMENT (OUTPATIENT)
Dept: RADIOLOGY | Facility: MEDICAL CENTER | Age: 46
DRG: 025 | End: 2021-01-29
Attending: NEUROLOGICAL SURGERY
Payer: COMMERCIAL

## 2021-01-29 ENCOUNTER — ANESTHESIA (OUTPATIENT)
Dept: SURGERY | Facility: MEDICAL CENTER | Age: 46
DRG: 025 | End: 2021-01-29
Payer: COMMERCIAL

## 2021-01-29 PROBLEM — R56.9 SEIZURE (HCC): Status: ACTIVE | Noted: 2021-01-29

## 2021-01-29 LAB
ABO + RH BLD: NORMAL
ALBUMIN SERPL BCP-MCNC: 3.7 G/DL (ref 3.2–4.9)
ALBUMIN/GLOB SERPL: 1.6 G/DL
ALP SERPL-CCNC: 46 U/L (ref 30–99)
ALT SERPL-CCNC: 11 U/L (ref 2–50)
ANION GAP SERPL CALC-SCNC: 8 MMOL/L (ref 7–16)
AST SERPL-CCNC: 7 U/L (ref 12–45)
BILIRUB SERPL-MCNC: 0.5 MG/DL (ref 0.1–1.5)
BUN SERPL-MCNC: 20 MG/DL (ref 8–22)
CALCIUM SERPL-MCNC: 8.9 MG/DL (ref 8.5–10.5)
CHLORIDE SERPL-SCNC: 99 MMOL/L (ref 96–112)
CO2 SERPL-SCNC: 27 MMOL/L (ref 20–33)
CREAT SERPL-MCNC: 0.59 MG/DL (ref 0.5–1.4)
ERYTHROCYTE [DISTWIDTH] IN BLOOD BY AUTOMATED COUNT: 39.2 FL (ref 35.9–50)
GLOBULIN SER CALC-MCNC: 2.3 G/DL (ref 1.9–3.5)
GLUCOSE BLD-MCNC: 338 MG/DL (ref 65–99)
GLUCOSE BLD-MCNC: 363 MG/DL (ref 65–99)
GLUCOSE BLD-MCNC: 398 MG/DL (ref 65–99)
GLUCOSE SERPL-MCNC: 257 MG/DL (ref 65–99)
HCT VFR BLD AUTO: 44.2 % (ref 37–47)
HGB BLD-MCNC: 15.3 G/DL (ref 12–16)
INR PPP: 0.96 (ref 0.87–1.13)
MCH RBC QN AUTO: 31.5 PG (ref 27–33)
MCHC RBC AUTO-ENTMCNC: 34.6 G/DL (ref 33.6–35)
MCV RBC AUTO: 90.9 FL (ref 81.4–97.8)
PATHOLOGY CONSULT NOTE: NORMAL
PLATELET # BLD AUTO: 313 K/UL (ref 164–446)
PMV BLD AUTO: 10.7 FL (ref 9–12.9)
POTASSIUM SERPL-SCNC: 4.5 MMOL/L (ref 3.6–5.5)
PROT SERPL-MCNC: 6 G/DL (ref 6–8.2)
PROTHROMBIN TIME: 13 SEC (ref 12–14.6)
RBC # BLD AUTO: 4.86 M/UL (ref 4.2–5.4)
SARS-COV+SARS-COV-2 AG RESP QL IA.RAPID: NOTDETECTED
SARS-COV-2 RNA RESP QL NAA+PROBE: NOTDETECTED
SODIUM SERPL-SCNC: 134 MMOL/L (ref 135–145)
SPECIMEN SOURCE: NORMAL
SPECIMEN SOURCE: NORMAL
WBC # BLD AUTO: 13.5 K/UL (ref 4.8–10.8)

## 2021-01-29 PROCEDURE — 700111 HCHG RX REV CODE 636 W/ 250 OVERRIDE (IP): Performed by: NEUROLOGICAL SURGERY

## 2021-01-29 PROCEDURE — 85027 COMPLETE CBC AUTOMATED: CPT

## 2021-01-29 PROCEDURE — 700102 HCHG RX REV CODE 250 W/ 637 OVERRIDE(OP): Performed by: CLINICAL NURSE SPECIALIST

## 2021-01-29 PROCEDURE — 500367 HCHG DRAIN KIT, HEMOVAC: Performed by: NEUROLOGICAL SURGERY

## 2021-01-29 PROCEDURE — A9270 NON-COVERED ITEM OR SERVICE: HCPCS | Performed by: ANESTHESIOLOGY

## 2021-01-29 PROCEDURE — 700111 HCHG RX REV CODE 636 W/ 250 OVERRIDE (IP): Performed by: STUDENT IN AN ORGANIZED HEALTH CARE EDUCATION/TRAINING PROGRAM

## 2021-01-29 PROCEDURE — 99291 CRITICAL CARE FIRST HOUR: CPT | Performed by: PSYCHIATRY & NEUROLOGY

## 2021-01-29 PROCEDURE — 88311 DECALCIFY TISSUE: CPT

## 2021-01-29 PROCEDURE — 70450 CT HEAD/BRAIN W/O DYE: CPT

## 2021-01-29 PROCEDURE — 160031 HCHG SURGERY MINUTES - 1ST 30 MINS LEVEL 5: Performed by: NEUROLOGICAL SURGERY

## 2021-01-29 PROCEDURE — 88331 PATH CONSLTJ SURG 1 BLK 1SPC: CPT

## 2021-01-29 PROCEDURE — 82962 GLUCOSE BLOOD TEST: CPT | Mod: 91

## 2021-01-29 PROCEDURE — 95822 EEG COMA OR SLEEP ONLY: CPT | Performed by: PSYCHIATRY & NEUROLOGY

## 2021-01-29 PROCEDURE — 80053 COMPREHEN METABOLIC PANEL: CPT

## 2021-01-29 PROCEDURE — C1713 ANCHOR/SCREW BN/BN,TIS/BN: HCPCS | Performed by: NEUROLOGICAL SURGERY

## 2021-01-29 PROCEDURE — 160035 HCHG PACU - 1ST 60 MINS PHASE I: Performed by: NEUROLOGICAL SURGERY

## 2021-01-29 PROCEDURE — 160042 HCHG SURGERY MINUTES - EA ADDL 1 MIN LEVEL 5: Performed by: NEUROLOGICAL SURGERY

## 2021-01-29 PROCEDURE — 770022 HCHG ROOM/CARE - ICU (200)

## 2021-01-29 PROCEDURE — 88307 TISSUE EXAM BY PATHOLOGIST: CPT | Mod: 59

## 2021-01-29 PROCEDURE — 500889 HCHG PACK, NEURO: Performed by: NEUROLOGICAL SURGERY

## 2021-01-29 PROCEDURE — 4A10X4Z MONITORING OF CENTRAL NERVOUS ELECTRICAL ACTIVITY, EXTERNAL APPROACH: ICD-10-PCS | Performed by: PSYCHIATRY & NEUROLOGY

## 2021-01-29 PROCEDURE — 00U20KZ SUPPLEMENT DURA MATER WITH NONAUTOLOGOUS TISSUE SUBSTITUTE, OPEN APPROACH: ICD-10-PCS | Performed by: NEUROLOGICAL SURGERY

## 2021-01-29 PROCEDURE — U0005 INFEC AGEN DETEC AMPLI PROBE: HCPCS

## 2021-01-29 PROCEDURE — 700102 HCHG RX REV CODE 250 W/ 637 OVERRIDE(OP): Performed by: ANESTHESIOLOGY

## 2021-01-29 PROCEDURE — 700101 HCHG RX REV CODE 250: Performed by: NEUROLOGICAL SURGERY

## 2021-01-29 PROCEDURE — 87426 SARSCOV CORONAVIRUS AG IA: CPT

## 2021-01-29 PROCEDURE — 700111 HCHG RX REV CODE 636 W/ 250 OVERRIDE (IP): Performed by: ANESTHESIOLOGY

## 2021-01-29 PROCEDURE — 160009 HCHG ANES TIME/MIN: Performed by: NEUROLOGICAL SURGERY

## 2021-01-29 PROCEDURE — 700105 HCHG RX REV CODE 258: Performed by: CLINICAL NURSE SPECIALIST

## 2021-01-29 PROCEDURE — 95819 EEG AWAKE AND ASLEEP: CPT | Performed by: PSYCHIATRY & NEUROLOGY

## 2021-01-29 PROCEDURE — 85610 PROTHROMBIN TIME: CPT

## 2021-01-29 PROCEDURE — 110454 HCHG SHELL REV 250: Performed by: NEUROLOGICAL SURGERY

## 2021-01-29 PROCEDURE — 160002 HCHG RECOVERY MINUTES (STAT): Performed by: NEUROLOGICAL SURGERY

## 2021-01-29 PROCEDURE — 700101 HCHG RX REV CODE 250: Performed by: ANESTHESIOLOGY

## 2021-01-29 PROCEDURE — 160048 HCHG OR STATISTICAL LEVEL 1-5: Performed by: NEUROLOGICAL SURGERY

## 2021-01-29 PROCEDURE — 700102 HCHG RX REV CODE 250 W/ 637 OVERRIDE(OP): Performed by: STUDENT IN AN ORGANIZED HEALTH CARE EDUCATION/TRAINING PROGRAM

## 2021-01-29 PROCEDURE — 700105 HCHG RX REV CODE 258: Performed by: ANESTHESIOLOGY

## 2021-01-29 PROCEDURE — 501838 HCHG SUTURE GENERAL: Performed by: NEUROLOGICAL SURGERY

## 2021-01-29 PROCEDURE — 500331 HCHG COTTONOID, SURG PATTIE: Performed by: NEUROLOGICAL SURGERY

## 2021-01-29 PROCEDURE — 00B10ZZ EXCISION OF CEREBRAL MENINGES, OPEN APPROACH: ICD-10-PCS | Performed by: NEUROLOGICAL SURGERY

## 2021-01-29 PROCEDURE — 700111 HCHG RX REV CODE 636 W/ 250 OVERRIDE (IP): Performed by: CLINICAL NURSE SPECIALIST

## 2021-01-29 PROCEDURE — A9270 NON-COVERED ITEM OR SERVICE: HCPCS | Performed by: CLINICAL NURSE SPECIALIST

## 2021-01-29 PROCEDURE — 95819 EEG AWAKE AND ASLEEP: CPT | Mod: 26 | Performed by: PSYCHIATRY & NEUROLOGY

## 2021-01-29 PROCEDURE — 700101 HCHG RX REV CODE 250: Performed by: CLINICAL NURSE SPECIALIST

## 2021-01-29 PROCEDURE — 500075 HCHG BLADE, CLIPPER NEURO: Performed by: NEUROLOGICAL SURGERY

## 2021-01-29 PROCEDURE — U0003 INFECTIOUS AGENT DETECTION BY NUCLEIC ACID (DNA OR RNA); SEVERE ACUTE RESPIRATORY SYNDROME CORONAVIRUS 2 (SARS-COV-2) (CORONAVIRUS DISEASE [COVID-19]), AMPLIFIED PROBE TECHNIQUE, MAKING USE OF HIGH THROUGHPUT TECHNOLOGIES AS DESCRIBED BY CMS-2020-01-R: HCPCS

## 2021-01-29 DEVICE — GRAFT DURAMATRIX ONLAY PLUS 3IN X 3IN OR 7.5CM X 7.5CM: Type: IMPLANTABLE DEVICE | Site: CRANIAL | Status: FUNCTIONAL

## 2021-01-29 DEVICE — SCREW STRYK NC 1.5X5MM (6NCX40=240) (80EA/PK) CONSIGNED QTY 240 PRE-LOAD: Type: IMPLANTABLE DEVICE | Site: CRANIAL | Status: FUNCTIONAL

## 2021-01-29 DEVICE — PLATE 90X90X0.6 MM NC/CMF MESH GOLD (6NC+2UFSX1=8): Type: IMPLANTABLE DEVICE | Site: CRANIAL | Status: FUNCTIONAL

## 2021-01-29 DEVICE — PLATE NC BURR HOLE COVER 10MM (6NCX6=36): Type: IMPLANTABLE DEVICE | Site: CRANIAL | Status: FUNCTIONAL

## 2021-01-29 RX ORDER — ONDANSETRON 2 MG/ML
4 INJECTION INTRAMUSCULAR; INTRAVENOUS EVERY 4 HOURS PRN
Status: DISCONTINUED | OUTPATIENT
Start: 2021-01-29 | End: 2021-02-04 | Stop reason: HOSPADM

## 2021-01-29 RX ORDER — ACETAMINOPHEN 325 MG/1
650 TABLET ORAL EVERY 6 HOURS PRN
Status: DISCONTINUED | OUTPATIENT
Start: 2021-01-29 | End: 2021-01-30

## 2021-01-29 RX ORDER — LABETALOL HYDROCHLORIDE 5 MG/ML
5 INJECTION, SOLUTION INTRAVENOUS
Status: DISCONTINUED | OUTPATIENT
Start: 2021-01-29 | End: 2021-01-29 | Stop reason: HOSPADM

## 2021-01-29 RX ORDER — OXYCODONE HYDROCHLORIDE 10 MG/1
10 TABLET ORAL
Status: DISCONTINUED | OUTPATIENT
Start: 2021-01-29 | End: 2021-01-30

## 2021-01-29 RX ORDER — LEVETIRACETAM 10 MG/ML
1000 INJECTION INTRAVASCULAR EVERY 12 HOURS
Status: DISCONTINUED | OUTPATIENT
Start: 2021-01-29 | End: 2021-01-29

## 2021-01-29 RX ORDER — HYDROMORPHONE HYDROCHLORIDE 1 MG/ML
0.1 INJECTION, SOLUTION INTRAMUSCULAR; INTRAVENOUS; SUBCUTANEOUS
Status: DISCONTINUED | OUTPATIENT
Start: 2021-01-29 | End: 2021-01-29 | Stop reason: HOSPADM

## 2021-01-29 RX ORDER — MANNITOL 250 MG/ML
INJECTION, SOLUTION INTRAVENOUS PRN
Status: DISCONTINUED | OUTPATIENT
Start: 2021-01-29 | End: 2021-01-29 | Stop reason: HOSPADM

## 2021-01-29 RX ORDER — LIDOCAINE HYDROCHLORIDE 20 MG/ML
JELLY TOPICAL PRN
Status: DISCONTINUED | OUTPATIENT
Start: 2021-01-29 | End: 2021-01-29 | Stop reason: SURG

## 2021-01-29 RX ORDER — MORPHINE SULFATE 4 MG/ML
4 INJECTION, SOLUTION INTRAMUSCULAR; INTRAVENOUS
Status: DISCONTINUED | OUTPATIENT
Start: 2021-01-29 | End: 2021-02-04 | Stop reason: HOSPADM

## 2021-01-29 RX ORDER — DEXAMETHASONE SODIUM PHOSPHATE 4 MG/ML
4 INJECTION, SOLUTION INTRA-ARTICULAR; INTRALESIONAL; INTRAMUSCULAR; INTRAVENOUS; SOFT TISSUE
Status: DISCONTINUED | OUTPATIENT
Start: 2021-01-29 | End: 2021-02-04 | Stop reason: HOSPADM

## 2021-01-29 RX ORDER — HYDROMORPHONE HYDROCHLORIDE 1 MG/ML
0.2 INJECTION, SOLUTION INTRAMUSCULAR; INTRAVENOUS; SUBCUTANEOUS
Status: DISCONTINUED | OUTPATIENT
Start: 2021-01-29 | End: 2021-01-29 | Stop reason: HOSPADM

## 2021-01-29 RX ORDER — SCOLOPAMINE TRANSDERMAL SYSTEM 1 MG/1
1 PATCH, EXTENDED RELEASE TRANSDERMAL
Status: DISCONTINUED | OUTPATIENT
Start: 2021-01-29 | End: 2021-02-04 | Stop reason: HOSPADM

## 2021-01-29 RX ORDER — SODIUM CHLORIDE 9 MG/ML
INJECTION, SOLUTION INTRAVENOUS
Status: DISCONTINUED | OUTPATIENT
Start: 2021-01-29 | End: 2021-01-29 | Stop reason: SURG

## 2021-01-29 RX ORDER — LABETALOL HYDROCHLORIDE 5 MG/ML
10 INJECTION, SOLUTION INTRAVENOUS
Status: DISCONTINUED | OUTPATIENT
Start: 2021-01-29 | End: 2021-02-04 | Stop reason: HOSPADM

## 2021-01-29 RX ORDER — DEXAMETHASONE SODIUM PHOSPHATE 4 MG/ML
10 INJECTION, SOLUTION INTRA-ARTICULAR; INTRALESIONAL; INTRAMUSCULAR; INTRAVENOUS; SOFT TISSUE EVERY 6 HOURS
Status: DISCONTINUED | OUTPATIENT
Start: 2021-01-29 | End: 2021-02-01

## 2021-01-29 RX ORDER — CEFAZOLIN SODIUM 1 G/3ML
INJECTION, POWDER, FOR SOLUTION INTRAMUSCULAR; INTRAVENOUS PRN
Status: DISCONTINUED | OUTPATIENT
Start: 2021-01-29 | End: 2021-01-29 | Stop reason: SURG

## 2021-01-29 RX ORDER — DIPHENHYDRAMINE HYDROCHLORIDE 50 MG/ML
25 INJECTION INTRAMUSCULAR; INTRAVENOUS EVERY 6 HOURS PRN
Status: DISCONTINUED | OUTPATIENT
Start: 2021-01-29 | End: 2021-02-04 | Stop reason: HOSPADM

## 2021-01-29 RX ORDER — HYDRALAZINE HYDROCHLORIDE 20 MG/ML
5 INJECTION INTRAMUSCULAR; INTRAVENOUS
Status: DISCONTINUED | OUTPATIENT
Start: 2021-01-29 | End: 2021-01-29 | Stop reason: HOSPADM

## 2021-01-29 RX ORDER — OXYCODONE HYDROCHLORIDE 5 MG/1
5 TABLET ORAL
Status: DISCONTINUED | OUTPATIENT
Start: 2021-01-29 | End: 2021-01-30

## 2021-01-29 RX ORDER — SODIUM CHLORIDE AND POTASSIUM CHLORIDE 150; 900 MG/100ML; MG/100ML
INJECTION, SOLUTION INTRAVENOUS CONTINUOUS
Status: DISCONTINUED | OUTPATIENT
Start: 2021-01-29 | End: 2021-01-29

## 2021-01-29 RX ORDER — CLONIDINE HYDROCHLORIDE 0.1 MG/1
0.1 TABLET ORAL EVERY 4 HOURS PRN
Status: DISCONTINUED | OUTPATIENT
Start: 2021-01-29 | End: 2021-01-30

## 2021-01-29 RX ORDER — CELECOXIB 200 MG/1
200 CAPSULE ORAL ONCE
Status: COMPLETED | OUTPATIENT
Start: 2021-01-29 | End: 2021-01-29

## 2021-01-29 RX ORDER — HYDROMORPHONE HYDROCHLORIDE 1 MG/ML
0.4 INJECTION, SOLUTION INTRAMUSCULAR; INTRAVENOUS; SUBCUTANEOUS
Status: DISCONTINUED | OUTPATIENT
Start: 2021-01-29 | End: 2021-01-29 | Stop reason: HOSPADM

## 2021-01-29 RX ORDER — GABAPENTIN 300 MG/1
300 CAPSULE ORAL ONCE
Status: COMPLETED | OUTPATIENT
Start: 2021-01-29 | End: 2021-01-29

## 2021-01-29 RX ORDER — HYDROMORPHONE HYDROCHLORIDE 2 MG/ML
INJECTION, SOLUTION INTRAMUSCULAR; INTRAVENOUS; SUBCUTANEOUS PRN
Status: DISCONTINUED | OUTPATIENT
Start: 2021-01-29 | End: 2021-01-29 | Stop reason: SURG

## 2021-01-29 RX ORDER — SUCCINYLCHOLINE/SOD CL,ISO/PF 200MG/10ML
SYRINGE (ML) INTRAVENOUS PRN
Status: DISCONTINUED | OUTPATIENT
Start: 2021-01-29 | End: 2021-01-29 | Stop reason: SURG

## 2021-01-29 RX ORDER — MEPERIDINE HYDROCHLORIDE 50 MG/ML
12.5 INJECTION INTRAMUSCULAR; INTRAVENOUS; SUBCUTANEOUS
Status: DISCONTINUED | OUTPATIENT
Start: 2021-01-29 | End: 2021-01-29 | Stop reason: HOSPADM

## 2021-01-29 RX ORDER — ONDANSETRON 2 MG/ML
4 INJECTION INTRAMUSCULAR; INTRAVENOUS
Status: DISCONTINUED | OUTPATIENT
Start: 2021-01-29 | End: 2021-01-29 | Stop reason: HOSPADM

## 2021-01-29 RX ORDER — OXYCODONE HCL 5 MG/5 ML
5 SOLUTION, ORAL ORAL
Status: DISCONTINUED | OUTPATIENT
Start: 2021-01-29 | End: 2021-01-29 | Stop reason: HOSPADM

## 2021-01-29 RX ORDER — CEFAZOLIN SODIUM 1 G/3ML
INJECTION, POWDER, FOR SOLUTION INTRAMUSCULAR; INTRAVENOUS
Status: DISCONTINUED | OUTPATIENT
Start: 2021-01-29 | End: 2021-01-29 | Stop reason: HOSPADM

## 2021-01-29 RX ORDER — HYDRALAZINE HYDROCHLORIDE 20 MG/ML
10 INJECTION INTRAMUSCULAR; INTRAVENOUS
Status: DISCONTINUED | OUTPATIENT
Start: 2021-01-29 | End: 2021-02-04 | Stop reason: HOSPADM

## 2021-01-29 RX ORDER — BUPIVACAINE HYDROCHLORIDE AND EPINEPHRINE 5; 5 MG/ML; UG/ML
INJECTION, SOLUTION EPIDURAL; INTRACAUDAL; PERINEURAL
Status: DISCONTINUED | OUTPATIENT
Start: 2021-01-29 | End: 2021-01-29 | Stop reason: HOSPADM

## 2021-01-29 RX ORDER — LORAZEPAM 2 MG/ML
INJECTION INTRAMUSCULAR
Status: ACTIVE
Start: 2021-01-29 | End: 2021-01-30

## 2021-01-29 RX ORDER — OXYCODONE HCL 5 MG/5 ML
10 SOLUTION, ORAL ORAL
Status: DISCONTINUED | OUTPATIENT
Start: 2021-01-29 | End: 2021-01-29 | Stop reason: HOSPADM

## 2021-01-29 RX ORDER — SODIUM CHLORIDE 9 MG/ML
INJECTION, SOLUTION INTRAVENOUS CONTINUOUS
Status: DISCONTINUED | OUTPATIENT
Start: 2021-01-29 | End: 2021-01-29

## 2021-01-29 RX ORDER — MIDAZOLAM HYDROCHLORIDE 1 MG/ML
INJECTION INTRAMUSCULAR; INTRAVENOUS PRN
Status: DISCONTINUED | OUTPATIENT
Start: 2021-01-29 | End: 2021-01-29 | Stop reason: SURG

## 2021-01-29 RX ORDER — LORAZEPAM 2 MG/ML
2 INJECTION INTRAMUSCULAR ONCE
Status: COMPLETED | OUTPATIENT
Start: 2021-01-29 | End: 2021-01-29

## 2021-01-29 RX ORDER — ROCURONIUM BROMIDE 10 MG/ML
INJECTION, SOLUTION INTRAVENOUS PRN
Status: DISCONTINUED | OUTPATIENT
Start: 2021-01-29 | End: 2021-01-29 | Stop reason: SURG

## 2021-01-29 RX ORDER — LIDOCAINE HYDROCHLORIDE 20 MG/ML
INJECTION, SOLUTION EPIDURAL; INFILTRATION; INTRACAUDAL; PERINEURAL PRN
Status: DISCONTINUED | OUTPATIENT
Start: 2021-01-29 | End: 2021-01-29 | Stop reason: SURG

## 2021-01-29 RX ORDER — METOPROLOL TARTRATE 1 MG/ML
1 INJECTION, SOLUTION INTRAVENOUS
Status: DISCONTINUED | OUTPATIENT
Start: 2021-01-29 | End: 2021-01-29 | Stop reason: HOSPADM

## 2021-01-29 RX ORDER — LORAZEPAM 2 MG/ML
2 INJECTION INTRAMUSCULAR
Status: DISCONTINUED | OUTPATIENT
Start: 2021-01-29 | End: 2021-02-04 | Stop reason: HOSPADM

## 2021-01-29 RX ORDER — SODIUM CHLORIDE 9 MG/ML
INJECTION, SOLUTION INTRAVENOUS CONTINUOUS
Status: DISCONTINUED | OUTPATIENT
Start: 2021-01-29 | End: 2021-02-01

## 2021-01-29 RX ORDER — LIDOCAINE HYDROCHLORIDE 40 MG/ML
SOLUTION TOPICAL PRN
Status: DISCONTINUED | OUTPATIENT
Start: 2021-01-29 | End: 2021-01-29 | Stop reason: SURG

## 2021-01-29 RX ORDER — CEFAZOLIN SODIUM 2 G/100ML
2 INJECTION, SOLUTION INTRAVENOUS EVERY 8 HOURS
Status: COMPLETED | OUTPATIENT
Start: 2021-01-29 | End: 2021-01-29

## 2021-01-29 RX ORDER — ACETAMINOPHEN 500 MG
1000 TABLET ORAL ONCE
Status: COMPLETED | OUTPATIENT
Start: 2021-01-29 | End: 2021-01-29

## 2021-01-29 RX ORDER — DIPHENHYDRAMINE HYDROCHLORIDE 50 MG/ML
12.5 INJECTION INTRAMUSCULAR; INTRAVENOUS
Status: DISCONTINUED | OUTPATIENT
Start: 2021-01-29 | End: 2021-01-29 | Stop reason: HOSPADM

## 2021-01-29 RX ORDER — HALOPERIDOL 5 MG/ML
1 INJECTION INTRAMUSCULAR
Status: DISCONTINUED | OUTPATIENT
Start: 2021-01-29 | End: 2021-01-29 | Stop reason: HOSPADM

## 2021-01-29 RX ORDER — DEXAMETHASONE SODIUM PHOSPHATE 4 MG/ML
INJECTION, SOLUTION INTRA-ARTICULAR; INTRALESIONAL; INTRAMUSCULAR; INTRAVENOUS; SOFT TISSUE PRN
Status: DISCONTINUED | OUTPATIENT
Start: 2021-01-29 | End: 2021-01-29 | Stop reason: SURG

## 2021-01-29 RX ORDER — LEVETIRACETAM 10 MG/ML
1000 INJECTION INTRAVASCULAR EVERY 12 HOURS
Status: DISCONTINUED | OUTPATIENT
Start: 2021-01-29 | End: 2021-02-02

## 2021-01-29 RX ADMIN — FENTANYL CITRATE 50 MCG: 50 INJECTION, SOLUTION INTRAMUSCULAR; INTRAVENOUS at 08:09

## 2021-01-29 RX ADMIN — LEVETIRACETAM INJECTION 1000 MG: 10 INJECTION INTRAVENOUS at 16:18

## 2021-01-29 RX ADMIN — CEFAZOLIN 2 G: 330 INJECTION, POWDER, FOR SOLUTION INTRAMUSCULAR; INTRAVENOUS at 07:22

## 2021-01-29 RX ADMIN — DEXAMETHASONE SODIUM PHOSPHATE 10 MG: 4 INJECTION, SOLUTION INTRA-ARTICULAR; INTRALESIONAL; INTRAMUSCULAR; INTRAVENOUS; SOFT TISSUE at 19:10

## 2021-01-29 RX ADMIN — PHENYLEPHRINE HYDROCHLORIDE 25 MCG: 10 INJECTION INTRAVENOUS at 08:07

## 2021-01-29 RX ADMIN — LIDOCAINE HYDROCHLORIDE 4 ML: 20 INJECTION, SOLUTION EPIDURAL; INFILTRATION; INTRACAUDAL at 07:17

## 2021-01-29 RX ADMIN — DEXAMETHASONE SODIUM PHOSPHATE 4 MG: 4 INJECTION, SOLUTION INTRA-ARTICULAR; INTRALESIONAL; INTRAMUSCULAR; INTRAVENOUS; SOFT TISSUE at 07:18

## 2021-01-29 RX ADMIN — HYDROMORPHONE HYDROCHLORIDE 1 MG: 2 INJECTION, SOLUTION INTRAMUSCULAR; INTRAVENOUS; SUBCUTANEOUS at 10:05

## 2021-01-29 RX ADMIN — DEXAMETHASONE SODIUM PHOSPHATE 10 MG: 4 INJECTION, SOLUTION INTRA-ARTICULAR; INTRALESIONAL; INTRAMUSCULAR; INTRAVENOUS; SOFT TISSUE at 23:51

## 2021-01-29 RX ADMIN — MIDAZOLAM HYDROCHLORIDE 2 MG: 1 INJECTION, SOLUTION INTRAMUSCULAR; INTRAVENOUS at 07:15

## 2021-01-29 RX ADMIN — LEVETIRACETAM INJECTION 1000 MG: 10 INJECTION INTRAVENOUS at 19:26

## 2021-01-29 RX ADMIN — ROCURONIUM BROMIDE 50 MG: 10 INJECTION, SOLUTION INTRAVENOUS at 07:17

## 2021-01-29 RX ADMIN — LIDOCAINE HYDROCHLORIDE 4 ML: 20 JELLY TOPICAL at 07:17

## 2021-01-29 RX ADMIN — MINERAL OIL, PETROLATUM 1 APPLICATION: 425; 573 OINTMENT OPHTHALMIC at 13:25

## 2021-01-29 RX ADMIN — SODIUM CHLORIDE: 9 INJECTION, SOLUTION INTRAVENOUS at 07:15

## 2021-01-29 RX ADMIN — LIDOCAINE HYDROCHLORIDE 4 ML: 40 SOLUTION TOPICAL at 07:17

## 2021-01-29 RX ADMIN — INSULIN LISPRO 12 UNITS: 100 INJECTION, SOLUTION INTRAVENOUS; SUBCUTANEOUS at 19:16

## 2021-01-29 RX ADMIN — ACETAMINOPHEN 1000 MG: 500 TABLET ORAL at 06:42

## 2021-01-29 RX ADMIN — CEFAZOLIN SODIUM 2 G: 2 INJECTION, SOLUTION INTRAVENOUS at 21:41

## 2021-01-29 RX ADMIN — ONDANSETRON 4 MG: 2 INJECTION INTRAMUSCULAR; INTRAVENOUS at 07:18

## 2021-01-29 RX ADMIN — CEFAZOLIN SODIUM 2 G: 2 INJECTION, SOLUTION INTRAVENOUS at 13:34

## 2021-01-29 RX ADMIN — CELECOXIB 200 MG: 200 CAPSULE ORAL at 06:42

## 2021-01-29 RX ADMIN — LORAZEPAM 2 MG: 2 INJECTION INTRAMUSCULAR; INTRAVENOUS at 15:30

## 2021-01-29 RX ADMIN — FENTANYL CITRATE 100 MCG: 50 INJECTION, SOLUTION INTRAMUSCULAR; INTRAVENOUS at 07:21

## 2021-01-29 RX ADMIN — MANNITOL 25 G: 12.5 INJECTION, SOLUTION INTRAVENOUS at 07:54

## 2021-01-29 RX ADMIN — INSULIN LISPRO 6 UNITS: 100 INJECTION, SOLUTION INTRAVENOUS; SUBCUTANEOUS at 19:10

## 2021-01-29 RX ADMIN — GABAPENTIN 300 MG: 300 CAPSULE ORAL at 06:42

## 2021-01-29 RX ADMIN — Medication 200 MG: at 07:17

## 2021-01-29 RX ADMIN — POTASSIUM CHLORIDE AND SODIUM CHLORIDE: 900; 150 INJECTION, SOLUTION INTRAVENOUS at 13:04

## 2021-01-29 RX ADMIN — PROPOFOL 200 MG: 10 INJECTION, EMULSION INTRAVENOUS at 07:17

## 2021-01-29 RX ADMIN — SODIUM CHLORIDE: 9 INJECTION, SOLUTION INTRAVENOUS at 14:50

## 2021-01-29 RX ADMIN — FENTANYL CITRATE 100 MCG: 50 INJECTION, SOLUTION INTRAMUSCULAR; INTRAVENOUS at 07:15

## 2021-01-29 RX ADMIN — DEXAMETHASONE SODIUM PHOSPHATE 10 MG: 4 INJECTION, SOLUTION INTRA-ARTICULAR; INTRALESIONAL; INTRAMUSCULAR; INTRAVENOUS; SOFT TISSUE at 13:16

## 2021-01-29 RX ADMIN — INSULIN LISPRO 12 UNITS: 100 INJECTION, SOLUTION INTRAVENOUS; SUBCUTANEOUS at 21:48

## 2021-01-29 ASSESSMENT — COGNITIVE AND FUNCTIONAL STATUS - GENERAL
SUGGESTED CMS G CODE MODIFIER DAILY ACTIVITY: CH
DAILY ACTIVITIY SCORE: 24
SUGGESTED CMS G CODE MODIFIER MOBILITY: CH
MOBILITY SCORE: 24

## 2021-01-29 ASSESSMENT — ENCOUNTER SYMPTOMS
PALPITATIONS: 0
NECK PAIN: 0
WHEEZING: 0
SINUS PAIN: 0
BLURRED VISION: 0
NERVOUS/ANXIOUS: 0
DOUBLE VISION: 0
SHORTNESS OF BREATH: 0
SPEECH CHANGE: 0
WEIGHT LOSS: 0
POLYDIPSIA: 0
COUGH: 0
FEVER: 0
BLOOD IN STOOL: 0
HEADACHES: 1
BRUISES/BLEEDS EASILY: 0
STRIDOR: 0
SPUTUM PRODUCTION: 0
MYALGIAS: 0
DIZZINESS: 0
VOMITING: 0
DIAPHORESIS: 0
FOCAL WEAKNESS: 0
HEARTBURN: 0
BACK PAIN: 0
TINGLING: 0
SENSORY CHANGE: 0
PHOTOPHOBIA: 0
CHILLS: 0
HEMOPTYSIS: 0
DEPRESSION: 0

## 2021-01-29 ASSESSMENT — PAIN SCALES - GENERAL: PAIN_LEVEL: 4

## 2021-01-29 ASSESSMENT — PAIN DESCRIPTION - PAIN TYPE
TYPE: SURGICAL PAIN
TYPE: SURGICAL PAIN

## 2021-01-29 NOTE — ANESTHESIA POSTPROCEDURE EVALUATION
Patient: Marni Muñoz    Procedure Summary     Date: 01/29/21 Room / Location: [facility] 03 / SURGERY [facility]    Anesthesia Start: 0715 Anesthesia Stop: 1138    Procedure: CRANIOTOMY, USING FRAMELESS STEREOTAXY-FOR TUMOR (Left Head) Diagnosis: (left sided brain tumor )    Surgeons: Luke Soto M.D. Responsible Provider: Rich Hauser D.O.    Anesthesia Type: general ASA Status: 2          Final Anesthesia Type: general  Last vitals  BP   Blood Pressure: (!) 96/63    Temp   36.1 °C (96.9 °F)    Pulse   Pulse: (!) 102   Resp   14    SpO2   94 %      Anesthesia Post Evaluation    Patient location during evaluation: PACU  Patient participation: complete - patient participated  Level of consciousness: awake and alert  Pain score: 4    Airway patency: patent  Anesthetic complications: no  Cardiovascular status: hemodynamically stable  Respiratory status: acceptable  Hydration status: euvolemic    PONV: none           Nurse Pain Score: 2 (NPRS)

## 2021-01-29 NOTE — ANESTHESIA PROCEDURE NOTES
Airway    Date/Time: 1/29/2021 7:19 AM  Performed by: Rich Hauser D.O.  Authorized by: Rich Hauser D.O.     Location:  OR  Urgency:  Elective  Indications for Airway Management:  Anesthesia      Spontaneous Ventilation: absent    Sedation Level:  Deep  Preoxygenated: Yes    Patient Position:  Sniffing  Final Airway Type:  Endotracheal airway  Final Endotracheal Airway:  ETT  Cuffed: Yes    Technique Used for Successful ETT Placement:  Direct laryngoscopy    Insertion Site:  Oral  Blade Type:  Maurisio  Laryngoscope Blade/Videolaryngoscope Blade Size:  3  ETT Size (mm):  7.0  Measured from:  Teeth  ETT to Teeth (cm):  20  Placement Verified by: auscultation and capnometry    Cormack-Lehane Classification:  Grade I - full view of glottis  Number of Attempts at Approach:  1

## 2021-01-29 NOTE — ANESTHESIA TIME REPORT
Anesthesia Start and Stop Event Times     Date Time Event    1/29/2021 0715 Anesthesia Start     1138 Anesthesia Stop        Responsible Staff  01/29/21    Name Role Begin End    Rich Hauser D.O. Anesth 0715 1138        Preop Diagnosis (Free Text):  Pre-op Diagnosis     left sided extraaxial calcified mass        Preop Diagnosis (Codes):    Post op Diagnosis  Meningeal tumor      Premium Reason  Non-Premium    Comments:

## 2021-01-29 NOTE — OP REPORT
DATE OF SERVICE:  01/29/2021     PREOPERATIVE DIAGNOSIS:  A large left-sided parafalcine meningioma occluding   the superior sagittal sinus with mass effect.     POSTOPERATIVE DIAGNOSIS:  A large left-sided parafalcine meningioma occluding   the superior sagittal sinus with mass effect.     PROCEDURES:  1.  Left-sided craniotomy greater than 5 cm for resection of meningioma.  2.  Use of O-arm neuronavigation.     SURGEON:  Luke Soto MD     ASSISTANT:  HILARIA Alexis     ANESTHESIA:  General.     COMPLICATIONS:  None.     ESTIMATED BLOOD LOSS:  100 mL     DESCRIPTION OF PROCEDURE:  The patient was brought to the operating room,   identified in the usual fashion.  General endotracheal anesthesia was induced   by the anesthesia team.  The patient was then placed supine on the operating   table.  A bump was placed under the patient's left shoulder.  Head was turned   toward the right exposing the left frontoparietal area.  We then placed the   patient in a Sugita 4-point pin fixation to the appropriate tightness.  We   then registered the Stealth with 2.2 mm in accuracy.  We planned a midline and   then planned the projection of the tumor on the skin.  I then planned a   trap-door incision crossing over to the right side of midline.  The patient   was then prepped and draped in the usual sterile fashion.  Local anesthesia   was infiltrated in subcutaneous tissue.  A 10 blade was used to incise the   skin.  Dissection was carried down to bone using Bovie electrocautery.    Retractors were put in place.  We placed Bisi clips on the scalp for   hemostasis.  We used to get fishhooks to retract the skin flap.  Rolled up 4 x   4 was placed underneath the skin flap to provide excessive kinking of the   flap.  We then identified the midline.  We placed 2 bur holes on the   ipsilateral side of midline,  two bur holes laterally and then 2 bur holes in   the contralateral side of midline.  A Penfield 1 and  3 and Helvetia dental were   used to separate the dura from the overlying bone.  We also placed a central   bur hole ipsilateral to midline.  We were not able to make a full bur hole   because there was tumor, so we used an acorn bit to identify the dura.  At   this point, again, we used a Helvetia dental and a Penfield 1 and 3 to separate   dura from the overlying bone.  We then turned a standard craniotomy flap.    There were no complications with this and upon trying to remove the bone there   was invasion of the tumor into the bone, so we had to scrape off very   carefully so as to not have excessive pulling on the dura.  Bone flap was   placed in antibiotic irrigation for further use.  We then opened the dura in a   cruciate fashion, pedicled medially, we first started laterally and then went   medially.  We identified tumor.  At this point, we sacrificed all the dura   and sent it off for pathology as it was involved in tumor, but we did have   good margins laterally, anteriorly and posteriorly with good clean dura.  We   then went circumferentially around the tumor superficially laterally,   anteriorly and posteriorly and placed patties and used bipolar electrocautery   for hemostasis.  It should be noted that this was an extremely difficult tumor   to resect given that the superficial border of the tumor were relatively   suckable with a CUSA on 100%, but the central portion of the tumor was very   heavily calcified and impossible to use even the CUSA on.  So, we had to   meticulously dissect the softer part of the tumor and  it from   between bone, put the central portion of the tumor in the surrounding brain.    We used patties to protect the brain.  After several hours of microdissection   with a CUSA bipolar electrocautery and patties, we were able to identify the   falx.  We were able to peel portion of the tumor off of the falx.  At this   point, we tried loop cautery for the harder portion of  the tumor that did not   work, so we ended up using a combination of a B1 bit drill to drill out the   majority of the tumor.  At this point, I then used a Leksell rongeur to also   remove portions of the tumor.  At this point, we had the tumor almost back   down to the falx.  We then used a 15 blade to incise the falx.  A small amount   of bipolar electrocautery at the falx for hemostasis.  We then took out much   of the falx as well as it was involved with tumor.  At this point, we were   able to see the contralateral side of the brain very nicely.  There was a good   plane at this point.  We then started working on the superior sagittal sinus.    There was complete occlusion because of the tumor, but both anteriorly and   posteriorly to the tumor there seemed to be very large draining veins draining   into the sinus, so we had to protect these veins meticulously.  We ended up   using a significant amount of bipolar electrocautery along the dura and along   the falx and a little bit near the superior sagittal sinus anteriorly to   cauterize any additional tumor cells.  Great care was taken not to violate   this large draining vein that was draining anteriorly.  We also used great   care not to violate the drain the vein that was draining posteriorly as it was   clearly enlarged at this point, but we did use a significant amount of   bipolar electrocautery to cauterize the rest of the tumor cells.  We ended up   leaving a small amount of very heavily calcified tumor posteriorly as we did   not want to get into the superior sagittal sinus and we left a little bit of   soft tumor that we bipolared aggressively anteriorly.  Again, great care was   taken not to violate the sinus at this point or those draining veins.  Once we   had meticulous hemostasis then we removed all the tumor that we could.  We   then lined the cavity with Surgicel.  The superior sagittal sinus was lined   with thrombin-soaked Gelfoam with  gentle tamponade.  We then placed Duragen   over the locations of removed dura.  We then took the bone flap and cut out a   large portion of the bone flap because it was involved with tumor.  We took   all the clean bone at this point and then placed it with titanium plates and   screws then placed a large piece of mesh to cover the area where we had   removed the bone completely.  We left a Hemovac drain in the subgaleal space   and then we closed the galea with 0 Vicryl inverted.  Skin was closed with   staples and then sterile dressing was applied.  There were no complications.    The patient was extubated very deep and transferred to the recovery room in   stable condition where she was found to be following commands x4 of relatively   equal strength in bilateral upper and lower extremities with no facial droop   at this point.        ______________________________  MD CARMEN PÉREZ/CORY    DD:  01/29/2021 14:30  DT:  01/29/2021 15:49    Job#:  [id number]

## 2021-01-29 NOTE — ANESTHESIA PROCEDURE NOTES
Arterial Line  Performed by: Rich Hauser D.O.  Authorized by: Rich Hauser D.O.     Start Time:  1/29/2021 7:28 AM  End Time:  1/29/2021 7:37 AM  Localization: surface landmarks    Patient Location:  OR  Indication: continuous blood pressure monitoring and blood sampling needed        Catheter Size:  20 G  Seldinger Technique?: Yes    Laterality:  Right  Site:  Radial artery  Line Secured:  Antimicrobial disc, tape, transparent dressing and e-cath device  Events: patient tolerated procedure well with no complications     Prepped with chloraprep, sterile gloves, ULS, sterile technique used.

## 2021-01-29 NOTE — ANESTHESIA PREPROCEDURE EVALUATION
Relevant Problems   No relevant active problems       Physical Exam    Airway   Mallampati: II  TM distance: >3 FB  Neck ROM: full       Cardiovascular - normal exam  Rhythm: regular  Rate: normal  (-) murmur     Dental - normal exam      Very poor dentition   Pulmonary - normal exam  Breath sounds clear to auscultation     Abdominal    Neurological - normal exam               Anesthesia Plan    ASA 2       Plan - general       Airway plan will be ETT        Induction: intravenous    Postoperative Plan: Postoperative administration of opioids is intended.    Pertinent diagnostic labs and testing reviewed    Informed Consent:    Anesthetic plan and risks discussed with patient.    Use of blood products discussed with: patient whom consented to blood products.

## 2021-01-29 NOTE — OR NURSING
11:35  Patient arrived from the OR to PACU 12B.  Bedside report received.  VSS.  Will continue to monitor for pain/nausea.      11:55  Dr. Soto at bedside for assessment.      12:20  Report called to JORDAN Dupont. Pt prepared for transport to room S107 via bed with RN & CNA.  Monitor in place for transport.

## 2021-01-29 NOTE — OR SURGEON
Immediate Post OP Note    PreOp Diagnosis: left parietal mass    PostOp Diagnosis: same    Procedure(s):  CRANIOTOMY, USING FRAMELESS STEREOTAXY-FOR TUMOR - Wound Class: Clean with Drain    Surgeon(s):  Luke Soto M.D.    Anesthesiologist/Type of Anesthesia:  Anesthesiologist: Rich Hauser D.O.  Anesthesia Technician: Tj Ball/General    Surgical Staff:  Assistant: LATASHA Glasgow  Circulator: Silvia Recinos R.N.  Relief Circulator: Ning Shaikh RSMITH; Yael Candelaria RSMITH  Scrub Person: Meeta Fung  [facility] : Saman Peraza    Specimens removed if any:  ID Type Source Tests Collected by Time Destination   A : left pariatel mass Tissue Brain PATHOLOGY SPECIMEN Luke Soto M.D. 1/29/2021  8:50 AM    B : left pariatel mass  Tissue Brain PATHOLOGY SPECIMEN Luke Soto M.D. 1/29/2021 10:08 AM        Estimated Blood Loss: 150cc    Findings: tumor resection    Complications: none        1/29/2021 11:28 AM LATASHA Glasgow

## 2021-01-30 ENCOUNTER — APPOINTMENT (OUTPATIENT)
Dept: RADIOLOGY | Facility: MEDICAL CENTER | Age: 46
DRG: 025 | End: 2021-01-30
Attending: INTERNAL MEDICINE
Payer: COMMERCIAL

## 2021-01-30 ENCOUNTER — APPOINTMENT (OUTPATIENT)
Dept: RADIOLOGY | Facility: MEDICAL CENTER | Age: 46
DRG: 025 | End: 2021-01-30
Attending: CLINICAL NURSE SPECIALIST
Payer: COMMERCIAL

## 2021-01-30 LAB
ANION GAP SERPL CALC-SCNC: 6 MMOL/L (ref 7–16)
BUN SERPL-MCNC: 13 MG/DL (ref 8–22)
CALCIUM SERPL-MCNC: 7.8 MG/DL (ref 8.5–10.5)
CHLORIDE SERPL-SCNC: 104 MMOL/L (ref 96–112)
CO2 SERPL-SCNC: 27 MMOL/L (ref 20–33)
CREAT SERPL-MCNC: 0.47 MG/DL (ref 0.5–1.4)
ERYTHROCYTE [DISTWIDTH] IN BLOOD BY AUTOMATED COUNT: 41.6 FL (ref 35.9–50)
GLUCOSE BLD-MCNC: 136 MG/DL (ref 65–99)
GLUCOSE BLD-MCNC: 161 MG/DL (ref 65–99)
GLUCOSE BLD-MCNC: 195 MG/DL (ref 65–99)
GLUCOSE BLD-MCNC: 218 MG/DL (ref 65–99)
GLUCOSE SERPL-MCNC: 189 MG/DL (ref 65–99)
HCT VFR BLD AUTO: 40.7 % (ref 37–47)
HGB BLD-MCNC: 13.7 G/DL (ref 12–16)
MCH RBC QN AUTO: 31.6 PG (ref 27–33)
MCHC RBC AUTO-ENTMCNC: 33.7 G/DL (ref 33.6–35)
MCV RBC AUTO: 94 FL (ref 81.4–97.8)
PLATELET # BLD AUTO: 325 K/UL (ref 164–446)
PMV BLD AUTO: 10.4 FL (ref 9–12.9)
POTASSIUM SERPL-SCNC: 4.1 MMOL/L (ref 3.6–5.5)
RBC # BLD AUTO: 4.33 M/UL (ref 4.2–5.4)
SODIUM SERPL-SCNC: 137 MMOL/L (ref 135–145)
WBC # BLD AUTO: 26.6 K/UL (ref 4.8–10.8)

## 2021-01-30 PROCEDURE — 99223 1ST HOSP IP/OBS HIGH 75: CPT | Mod: 25 | Performed by: PSYCHIATRY & NEUROLOGY

## 2021-01-30 PROCEDURE — 4A10X4Z MONITORING OF CENTRAL NERVOUS ELECTRICAL ACTIVITY, EXTERNAL APPROACH: ICD-10-PCS | Performed by: PSYCHIATRY & NEUROLOGY

## 2021-01-30 PROCEDURE — 700111 HCHG RX REV CODE 636 W/ 250 OVERRIDE (IP): Performed by: CLINICAL NURSE SPECIALIST

## 2021-01-30 PROCEDURE — 770022 HCHG ROOM/CARE - ICU (200)

## 2021-01-30 PROCEDURE — 99291 CRITICAL CARE FIRST HOUR: CPT | Performed by: INTERNAL MEDICINE

## 2021-01-30 PROCEDURE — 85027 COMPLETE CBC AUTOMATED: CPT

## 2021-01-30 PROCEDURE — 95720 EEG PHY/QHP EA INCR W/VEEG: CPT | Performed by: PSYCHIATRY & NEUROLOGY

## 2021-01-30 PROCEDURE — 302136 NUTRITION PUMP: Performed by: INTERNAL MEDICINE

## 2021-01-30 PROCEDURE — A9576 INJ PROHANCE MULTIPACK: HCPCS | Performed by: INTERNAL MEDICINE

## 2021-01-30 PROCEDURE — 700111 HCHG RX REV CODE 636 W/ 250 OVERRIDE (IP): Performed by: NEUROLOGICAL SURGERY

## 2021-01-30 PROCEDURE — 82962 GLUCOSE BLOOD TEST: CPT

## 2021-01-30 PROCEDURE — 95714 VEEG EA 12-26 HR UNMNTR: CPT | Performed by: PSYCHIATRY & NEUROLOGY

## 2021-01-30 PROCEDURE — 700117 HCHG RX CONTRAST REV CODE 255: Performed by: INTERNAL MEDICINE

## 2021-01-30 PROCEDURE — 95700 EEG CONT REC W/VID EEG TECH: CPT | Performed by: PSYCHIATRY & NEUROLOGY

## 2021-01-30 PROCEDURE — 306565 RIGID MIT RESTRAINT(PAIR): Performed by: INTERNAL MEDICINE

## 2021-01-30 PROCEDURE — 70553 MRI BRAIN STEM W/O & W/DYE: CPT

## 2021-01-30 PROCEDURE — 80048 BASIC METABOLIC PNL TOTAL CA: CPT

## 2021-01-30 RX ORDER — ACETAMINOPHEN 325 MG/1
650 TABLET ORAL EVERY 6 HOURS PRN
Status: DISPENSED | OUTPATIENT
Start: 2021-01-30 | End: 2021-02-03

## 2021-01-30 RX ORDER — POLYETHYLENE GLYCOL 3350 17 G/17G
1 POWDER, FOR SOLUTION ORAL
Status: DISCONTINUED | OUTPATIENT
Start: 2021-01-30 | End: 2021-02-04 | Stop reason: HOSPADM

## 2021-01-30 RX ORDER — OXYCODONE HYDROCHLORIDE 10 MG/1
10 TABLET ORAL
Status: DISCONTINUED | OUTPATIENT
Start: 2021-01-30 | End: 2021-02-04 | Stop reason: HOSPADM

## 2021-01-30 RX ORDER — PROMETHAZINE HYDROCHLORIDE 25 MG/1
12.5-25 TABLET ORAL EVERY 4 HOURS PRN
Status: DISCONTINUED | OUTPATIENT
Start: 2021-01-30 | End: 2021-02-04 | Stop reason: HOSPADM

## 2021-01-30 RX ORDER — CLONIDINE HYDROCHLORIDE 0.1 MG/1
0.1 TABLET ORAL EVERY 4 HOURS PRN
Status: DISCONTINUED | OUTPATIENT
Start: 2021-01-30 | End: 2021-02-04 | Stop reason: HOSPADM

## 2021-01-30 RX ORDER — AMOXICILLIN 250 MG
2 CAPSULE ORAL 2 TIMES DAILY
Status: DISCONTINUED | OUTPATIENT
Start: 2021-01-30 | End: 2021-02-04 | Stop reason: HOSPADM

## 2021-01-30 RX ORDER — OXYCODONE HYDROCHLORIDE 5 MG/1
5 TABLET ORAL
Status: DISCONTINUED | OUTPATIENT
Start: 2021-01-30 | End: 2021-02-04 | Stop reason: HOSPADM

## 2021-01-30 RX ORDER — BISACODYL 10 MG
10 SUPPOSITORY, RECTAL RECTAL
Status: DISCONTINUED | OUTPATIENT
Start: 2021-01-30 | End: 2021-02-04 | Stop reason: HOSPADM

## 2021-01-30 RX ORDER — INSULIN GLARGINE 100 [IU]/ML
35 INJECTION, SOLUTION SUBCUTANEOUS EVERY EVENING
Status: DISCONTINUED | OUTPATIENT
Start: 2021-01-30 | End: 2021-01-31

## 2021-01-30 RX ADMIN — DEXAMETHASONE SODIUM PHOSPHATE 10 MG: 4 INJECTION, SOLUTION INTRA-ARTICULAR; INTRALESIONAL; INTRAMUSCULAR; INTRAVENOUS; SOFT TISSUE at 12:06

## 2021-01-30 RX ADMIN — INSULIN LISPRO 4 UNITS: 100 INJECTION, SOLUTION INTRAVENOUS; SUBCUTANEOUS at 21:50

## 2021-01-30 RX ADMIN — MORPHINE SULFATE 4 MG: 4 INJECTION INTRAVENOUS at 10:01

## 2021-01-30 RX ADMIN — LEVETIRACETAM INJECTION 1000 MG: 10 INJECTION INTRAVENOUS at 05:20

## 2021-01-30 RX ADMIN — MORPHINE SULFATE 4 MG: 4 INJECTION INTRAVENOUS at 22:21

## 2021-01-30 RX ADMIN — DEXAMETHASONE SODIUM PHOSPHATE 10 MG: 4 INJECTION, SOLUTION INTRA-ARTICULAR; INTRALESIONAL; INTRAMUSCULAR; INTRAVENOUS; SOFT TISSUE at 17:44

## 2021-01-30 RX ADMIN — GADOTERIDOL 20 ML: 279.3 INJECTION, SOLUTION INTRAVENOUS at 10:30

## 2021-01-30 RX ADMIN — MINERAL OIL, PETROLATUM 1 APPLICATION: 425; 573 OINTMENT OPHTHALMIC at 05:20

## 2021-01-30 RX ADMIN — MORPHINE SULFATE 4 MG: 4 INJECTION INTRAVENOUS at 12:57

## 2021-01-30 RX ADMIN — INSULIN LISPRO 6 UNITS: 100 INJECTION, SOLUTION INTRAVENOUS; SUBCUTANEOUS at 11:27

## 2021-01-30 RX ADMIN — LEVETIRACETAM INJECTION 1000 MG: 10 INJECTION INTRAVENOUS at 17:43

## 2021-01-30 RX ADMIN — INSULIN LISPRO 3 UNITS: 100 INJECTION, SOLUTION INTRAVENOUS; SUBCUTANEOUS at 11:25

## 2021-01-30 RX ADMIN — DEXAMETHASONE SODIUM PHOSPHATE 10 MG: 4 INJECTION, SOLUTION INTRA-ARTICULAR; INTRALESIONAL; INTRAMUSCULAR; INTRAVENOUS; SOFT TISSUE at 05:22

## 2021-01-30 RX ADMIN — INSULIN LISPRO 3 UNITS: 100 INJECTION, SOLUTION INTRAVENOUS; SUBCUTANEOUS at 07:50

## 2021-01-30 RX ADMIN — INSULIN LISPRO 6 UNITS: 100 INJECTION, SOLUTION INTRAVENOUS; SUBCUTANEOUS at 07:52

## 2021-01-30 ASSESSMENT — ENCOUNTER SYMPTOMS
BRUISES/BLEEDS EASILY: 0
HEARTBURN: 0
PHOTOPHOBIA: 0
SENSORY CHANGE: 0
TINGLING: 0
CHILLS: 0
DIAPHORESIS: 0
DOUBLE VISION: 0
SPEECH CHANGE: 0
COUGH: 0
NERVOUS/ANXIOUS: 0
PALPITATIONS: 0
DEPRESSION: 0
DIZZINESS: 0
SHORTNESS OF BREATH: 0
POLYDIPSIA: 0
SPUTUM PRODUCTION: 0
BLURRED VISION: 0
BLOOD IN STOOL: 0
VOMITING: 0
NECK PAIN: 0
SINUS PAIN: 0
HEMOPTYSIS: 0
MYALGIAS: 0
STRIDOR: 0
BACK PAIN: 0
FOCAL WEAKNESS: 0
HEADACHES: 1
WEIGHT LOSS: 0
WHEEZING: 0
FEVER: 0

## 2021-01-30 ASSESSMENT — PAIN DESCRIPTION - PAIN TYPE
TYPE: SURGICAL PAIN
TYPE: ACUTE PAIN
TYPE: SURGICAL PAIN

## 2021-01-30 ASSESSMENT — FIBROSIS 4 INDEX: FIB4 SCORE: 0.32

## 2021-01-30 NOTE — PROGRESS NOTES
Pt back from MRI on the monitor with ACLS RN and MRI tech.      at the bedside to assess the pt.

## 2021-01-30 NOTE — ASSESSMENT & PLAN NOTE
GTC ~ 1 min, no recurrence  Abated with Lorazepam, redose as needed  Continue Keppra   Vimpat added  Continuous EEG discontinued  Optimize electrolytes  Seizure precautions

## 2021-01-30 NOTE — PROGRESS NOTES
"Critical Care Progress Note    Date of admission  1/21/2021    Chief Complaint  45 y.o. female transferred to ICU 1/25 with hypoxia after intubation for surgery.    Hospital Course  \"45 y.o. female who presented 1/21/2021 with large calcified meningioma, day 5 of [facility] and there is mass effect.  Planned surgery today but not oxygenating when on ventilator. left side white out on chest xray in OR with ET tube in right main stem.  Per nursing/Dr Soto bronchoscopy in OR with adequate placement per anesthesiology, no procedure note currently.  Repeat intubation without improvement in hypoxia.  No secretions noted.  Critical care consulted by neurosurgery.  Patient now alert and oriented and on room air sating adequately.  I have ordered a repeat chest xray.  Per bedside us per myself adequate LVEF, right ventricle not dilated and fluid status adequate.  A lines upper chest with some scattered b lines lower chest.  No pleural effusion. \"    1/26 -room air, no distress, transfer out of ICU, plan for surgery in the coming days    Interval Problem Update  Reviewed last 24 hour events:  Called to bedside after 1 min seizure  Received a Keppra Load and 2mg ativan  At present GCS8 [id number]. Localizes and moans  Protecting her airway  Reviewed scan  Discussed with NSG - Dr. Soto    Review of Systems  Review of Systems   Constitutional: Negative for chills, diaphoresis, fever, malaise/fatigue and weight loss.   HENT: Negative for congestion and sinus pain.    Eyes: Negative for blurred vision, double vision and photophobia.   Respiratory: Negative for cough, hemoptysis, sputum production, shortness of breath, wheezing and stridor.    Cardiovascular: Negative for chest pain, palpitations and leg swelling.   Gastrointestinal: Negative for blood in stool, heartburn, melena and vomiting.   Genitourinary: Negative for dysuria and urgency.   Musculoskeletal: Negative for back pain, myalgias and neck pain.   Skin: Negative " for itching and rash.   Neurological: Positive for headaches. Negative for dizziness, tingling, sensory change, speech change and focal weakness.   Endo/Heme/Allergies: Negative for polydipsia. Does not bruise/bleed easily.   Psychiatric/Behavioral: Negative for depression. The patient is not nervous/anxious.         Vital Signs for last 24 hours   Temp:  [35.6 °C (96 °F)-37.1 °C (98.7 °F)] 36.2 °C (97.1 °F)  Pulse:  [] 77  Resp:  [12-27] 12  BP: ()/(56-77) 107/66  SpO2:  [93 %-97 %] 95 %    Hemodynamic parameters for last 24 hours       Respiratory Information for the last 24 hours       Physical Exam   Physical Exam  Vitals signs and nursing note reviewed.   Constitutional:       General: She is not in acute distress.     Appearance: She is not ill-appearing, toxic-appearing or diaphoretic.   HENT:      Head: Normocephalic and atraumatic.      Right Ear: External ear normal.      Left Ear: External ear normal.      Nose: No congestion or rhinorrhea.      Mouth/Throat:      Mouth: Mucous membranes are moist.      Pharynx: No oropharyngeal exudate or posterior oropharyngeal erythema.   Eyes:      General: No scleral icterus.     Extraocular Movements: Extraocular movements intact.      Conjunctiva/sclera: Conjunctivae normal.      Pupils: Pupils are equal, round, and reactive to light.   Neck:      Musculoskeletal: Neck supple. No neck rigidity or muscular tenderness.   Cardiovascular:      Rate and Rhythm: Normal rate and regular rhythm.      Pulses: Normal pulses.      Heart sounds: Normal heart sounds. No murmur.   Pulmonary:      Effort: No respiratory distress.      Breath sounds: No wheezing.   Abdominal:      General: There is no distension.      Palpations: There is no mass.      Tenderness: There is no abdominal tenderness. There is no guarding.   Musculoskeletal:         General: No swelling or tenderness.      Right lower leg: No edema.      Left lower leg: No edema.   Lymphadenopathy:       Cervical: No cervical adenopathy.   Skin:     Coloration: Skin is not jaundiced or pale.      Findings: No bruising, erythema, lesion or rash.   Neurological:      Comments: GCS 9. Brainstem reflexes intact. Localizes to nox stim. Moans   Psychiatric:         Mood and Affect: Mood normal.         Behavior: Behavior normal.         Medications  Current Facility-Administered Medications   Medication Dose Route Frequency Provider Last Rate Last Admin   • dexamethasone (DECADRON) injection 10 mg  10 mg Intravenous Q6HRS Jono Qureshi, A.P.N.   10 mg at 01/29/21 1316   • Pharmacy Consult Request ...Pain Management Review 1 Each  1 Each Other PHARMACY TO DOSE Jono Qureshi, A.P.N.       • MD ALERT...DO NOT ADMINISTER NSAIDS or ASPIRIN unless ORDERED By Neurosurgery 1 Each  1 Each Other PRN Jono Qureshi, A.P.N.       • dexamethasone (DECADRON) injection 4 mg  4 mg Intravenous Once PRN Jono Qureshi, A.P.N.       • diphenhydrAMINE (BENADRYL) injection 25 mg  25 mg Intravenous Q6HRS PRN Jono Qureshi, A.P.N.       • scopolamine (TRANSDERM-SCOP) patch 1 Patch  1 Patch Transdermal Q72HRS PRN Jono Qureshi, A.P.N.       • labetalol (NORMODYNE/TRANDATE) injection 10 mg  10 mg Intravenous Q HOUR PRN Jono Qureshi, A.P.N.       • hydrALAZINE (APRESOLINE) injection 10 mg  10 mg Intravenous Q HOUR PRN Jono Qureshi, A.P.N.       • cloNIDine (CATAPRES) tablet 0.1 mg  0.1 mg Oral Q4HRS PRN Jono Qureshi, A.P.N.       • niCARdipine (CARDENE) 25 mg in  mL Infusion  0-15 mg/hr Intravenous Continuous Jono Qureshi, A.P.N.   Stopped at 01/29/21 1315   • artificial tears (EYE LUBRICANT) ophth ointment 1 Application  1 Application Both Eyes Q8HRS Jono Qureshi, A.P.N.   1 Application at 01/29/21 1325   • acetaminophen (Tylenol) tablet 650 mg  650 mg Oral Q6HRS PRN Jono Gilmoreard, A.P.N.       • oxyCODONE immediate-release (ROXICODONE) tablet 5 mg  5 mg Oral Q3HRS PRN LATASHA Glasgow       •  oxyCODONE immediate release (ROXICODONE) tablet 10 mg  10 mg Oral Q3HRS PRN Jono Qureshi, A.P.N.       • morphine (pf) 4 mg/mL injection 4 mg  4 mg Intravenous Q3HRS PRN Jono Qureshi, A.P.N.       • ceFAZolin in dextrose (ANCEF) IVPB premix 2 g  2 g Intravenous Q8HR Jono Qureshi, A.P.N.   Stopped at 01/29/21 1404   • ondansetron (ZOFRAN) syringe/vial injection 4 mg  4 mg Intravenous Q4HRS PRN Jono Qureshi, A.P.N.       • NS infusion   Intravenous Continuous Jono Qureshi, A.P.N. 100 mL/hr at 01/29/21 1450 New Bag at 01/29/21 1450   • LORAZEPAM 2 MG/ML INJ SOLN            • levETIRAcetam (Keppra) 1000 mg in 100 mL NaCl IV premix  1,000 mg Intravenous Q12HRS Luke Soto M.D. 400 mL/hr at 01/29/21 1618 1,000 mg at 01/29/21 1618   • insulin glargine (Lantus) injection  35 Units Subcutaneous DAILY AT NOON Rene Gonzalez M.D.       • insulin lispro (HumaLOG) injection  0.2 Units/kg/day Subcutaneous TID AC Rene Gonzalez M.D.   6 Units at 01/28/21 1740   • insulin lispro (HumaLOG) injection  3-14 Units Subcutaneous 4X/DAY KYAW Pollack M.D.   10 Units at 01/28/21 2031    And   • glucose 4 g chewable tablet 16 g  16 g Oral Q15 MIN PRN Princess Pollack M.D.        And   • dextrose 50% (D50W) injection 50 mL  50 mL Intravenous Q15 MIN PRN Princess Pollack M.D.       • sodium chloride (SALT) tablet 1 g  1 g Oral Q8HRS Malik Sierra M.D.   Stopped at 01/29/21 1400   • senna-docusate (PERICOLACE or SENOKOT S) 8.6-50 MG per tablet 2 Tab  2 Tab Oral BID Win Scanlon M.D.   2 Tab at 01/28/21 0502    And   • polyethylene glycol/lytes (MIRALAX) PACKET 1 Packet  1 Packet Oral QDAY PRN Win Scanlon M.D.        And   • magnesium hydroxide (MILK OF MAGNESIA) suspension 30 mL  30 mL Oral QDAY PRN Win Scanlon M.D.        And   • bisacodyl (DULCOLAX) suppository 10 mg  10 mg Rectal QDAY PRN Win Scanlon M.D.       • ondansetron (ZOFRAN ODT) dispertab 4 mg  4 mg Oral Q4HRS PRN Win Scanlon M.D.       • promethazine  (PHENERGAN) tablet 12.5-25 mg  12.5-25 mg Oral Q4HRS PRN Win CULVER Ip, M.D.       • promethazine (PHENERGAN) suppository 12.5-25 mg  12.5-25 mg Rectal Q4HRS PRN Win CULVER Ip, M.D.       • prochlorperazine (COMPAZINE) injection 5-10 mg  5-10 mg Intravenous Q4HRS PRN Win CULVER Ip, M.D.           Fluids    Intake/Output Summary (Last 24 hours) at 1/29/2021 1635  Last data filed at 1/29/2021 1544  Gross per 24 hour   Intake 500 ml   Output 1415 ml   Net -915 ml       Laboratory          Recent Labs     01/28/21  0102 01/29/21  0305   SODIUM 132* 134*   POTASSIUM 3.7 4.5   CHLORIDE 97 99   CO2 25 27   BUN 19 20   CREATININE 0.54 0.59   CALCIUM 8.9 8.9     Recent Labs     01/28/21  0102 01/29/21  0305   ALTSGPT  --  11   ASTSGOT  --  7*   ALKPHOSPHAT  --  46   TBILIRUBIN  --  0.5   GLUCOSE 263* 257*     Recent Labs     01/29/21  0305   WBC 13.5*   ASTSGOT 7*   ALTSGPT 11   ALKPHOSPHAT 46   TBILIRUBIN 0.5     Recent Labs     01/29/21  0305   RBC 4.86   HEMOGLOBIN 15.3   HEMATOCRIT 44.2   PLATELETCT 313   PROTHROMBTM 13.0   INR 0.96       Imaging  X-Ray:  I have personally reviewed the images and compared with prior images.    Assessment/Plan  * Meningioma (HCC)- (present on admission)  Assessment & Plan  S/p resection with Dr. Soto today  Neurochecks and VS per protocol  SBP < 150      Seizure (HCC)  Assessment & Plan  GTC ~ 1 min  Abated with Lorazepam  Loaded with Keppra increased  Stat EEG to evaluate for subclinical seizures.    Hypoxia  Assessment & Plan  S/p seizure some desats  Protecting airway  Ween NRB      Diabetes mellitus (HCC)  Assessment & Plan  Poorly controlled, likely hemoglobin 9.7  Exacerbated by current steroid use  Increase Lantus, continue SSI    Obesity (BMI 30.0-34.9)  Assessment & Plan  Likely contributory to pulmonary risk for surgery  No history of albina diagnosis  Possible underlying pulmonary htn  ECHO ordered         I have performed a physical exam and reviewed and updated ROS and Plan today  (1/29/2021). In review of yesterday's note (1/28/2021), there are no changes except as documented above.     Critical care time = 35 min. No time overlap and excludes procedures.

## 2021-01-30 NOTE — PROGRESS NOTES
Neurosurgery Progress Note    Subjective:  -post op, pt still has left weakness, minimal FC on RLE only will say ouch only aphasic     Exam:  Lethargic , GCS 11  PERRL, EOMI  Face symm, tongue midline  HOLM R>L will withdraw to pain     HVC >450    BP  Min: 95/63  Max: 120/73  Pulse  Av.4  Min: 77  Max: 145  Resp  Av.5  Min: 12  Max: 45  Temp  Av.4 °C (97.6 °F)  Min: 35.6 °C (96 °F)  Max: 37.4 °C (99.3 °F)  SpO2  Av.3 %  Min: 87 %  Max: 98 %    No data recorded    Recent Labs     21  0305 21  0442   WBC 13.5* 26.6*   RBC 4.86 4.33   HEMOGLOBIN 15.3 13.7   HEMATOCRIT 44.2 40.7   MCV 90.9 94.0   MCH 31.5 31.6   MCHC 34.6 33.7   RDW 39.2 41.6   PLATELETCT 313 325   MPV 10.7 10.4     Recent Labs     21  0102 21  0305 21  0442   SODIUM 132* 134* 137   POTASSIUM 3.7 4.5 4.1   CHLORIDE 97 99 104   CO2 25 27 27   GLUCOSE 263* 257* 189*   BUN 19 20 13   CREATININE 0.54 0.59 0.47*   CALCIUM 8.9 8.9 7.8*     Recent Labs     21  0305   INR 0.96           Intake/Output       21 0700 - 21 0659 21 07 - 21 0659       9891-3583 Total  4994-4612 Total       Intake    I.V.  500  2176.7 2676.7  400  -- 400    Volume (mL) (NS infusion) 500 -- 500 -- -- --    Volume (mL) (0.9 % NaCl with KCl 20 mEq infusion) -- 660 660 -- -- --    Volume (mL) (NS infusion) -- 1516.7 1516.7 400 -- 400    IV Piggyback  --  400 400  --  -- --    Volume (mL) (ceFAZolin in dextrose (ANCEF) IVPB premix 2 g) -- 200 200 -- -- --    Volume (mL) (levETIRAcetam (Keppra) 1000 mg in 100 mL NaCl IV premix) -- 200 200 -- -- --    Total Intake 500 2576.7 3076.7 400 -- 400       Output    Urine  1550  690 2240  65  -- 65    Urine 1100 -- 1100 -- -- --    Output (mL) (Urethral Catheter Temperature probe 16 Fr.)  65 -- 65    Drains  275  190 465  --  -- --    Output (mL) (Closed/Suction Drain 1 Anterior;Left Scalp Hemovac) 275 190 465 -- -- --    Stool  --  -- --   --  -- --    Number of Times Stooled -- -- -- 0 x -- 0 x    Blood  100  -- 100  --  -- --    Est. Blood Loss 100 -- 100 -- -- --    Total Output 1850 433 7124 65 -- 65       Net I/O     -1425 1696.7 271.7 335 -- 335            Intake/Output Summary (Last 24 hours) at 1/30/2021 1007  Last data filed at 1/30/2021 1000  Gross per 24 hour   Intake 3476.67 ml   Output 2870 ml   Net 606.67 ml            • dexamethasone  10 mg Q6HRS   • Pharmacy Consult Request  1 Each PHARMACY TO DOSE   • MD ALERT...DO NOT ADMINISTER NSAIDS or ASPIRIN unless ORDERED By Neurosurgery  1 Each PRN   • dexamethasone  4 mg Once PRN   • diphenhydrAMINE  25 mg Q6HRS PRN   • scopolamine  1 Patch Q72HRS PRN   • labetalol  10 mg Q HOUR PRN   • hydrALAZINE  10 mg Q HOUR PRN   • cloNIDine  0.1 mg Q4HRS PRN   • niCARdipine infusion  0-15 mg/hr Continuous   • artificial tears  1 Application Q8HRS   • acetaminophen  650 mg Q6HRS PRN   • oxyCODONE immediate-release  5 mg Q3HRS PRN   • oxyCODONE immediate release  10 mg Q3HRS PRN   • morphine injection  4 mg Q3HRS PRN   • ondansetron  4 mg Q4HRS PRN   • NS   Continuous   • LORazepam  2 mg Q5 MIN PRN   • levETIRAcetam (Keppra) IV  1,000 mg Q12HRS   • insulin glargine  35 Units DAILY AT NOON   • insulin lispro  0.2 Units/kg/day TID AC   • insulin lispro  3-14 Units 4X/DAY ACHS    And   • glucose  16 g Q15 MIN PRN    And   • dextrose 50%  50 mL Q15 MIN PRN   • sodium chloride  1 g Q8HRS   • senna-docusate  2 Tab BID    And   • polyethylene glycol/lytes  1 Packet QDAY PRN    And   • magnesium hydroxide  30 mL QDAY PRN    And   • bisacodyl  10 mg QDAY PRN   • ondansetron  4 mg Q4HRS PRN   • promethazine  12.5-25 mg Q4HRS PRN   • promethazine  12.5-25 mg Q4HRS PRN   • prochlorperazine  5-10 mg Q4HRS PRN       Assessment and Plan:  Hospital day #8 large left extraaxial calcified mass c/w meningioma sig mass effect  POD #1  Prophylactic anticoagulation: hold         Start date/time: pt ambulatory      Pt exam  suspicious for subclinical sz,  would consult neurology and hook up for continuous EEG. May have subcortical sz based on exam.  MRI shows no concerning findings       keppra 1000 BID  Dec 10Q6  Neuro checks Q1 through today while confused   Hyperglycemia - on lantus, regular insulin  Bowel protocol- no BM, + flatus, will give mag citrate  Ct head p[ost op stable no ICH no strokes   Labs ok- leukocytosis this am likely from both sz, and post op   HVC retain large output ~450  MRI 1/30 shows no stroks, no venus congestion or  edema next to resection cavity         Simms

## 2021-01-30 NOTE — PROGRESS NOTES
"Critical Care Progress Note    Date of admission  1/21/2021    Chief Complaint  45 y.o. female transferred to ICU 1/25 with hypoxia after intubation for surgery.    Hospital Course  \"45 y.o. female who presented 1/21/2021 with large calcified meningioma, day 5 of \[facility]\"" and there is mass effect.  Planned surgery today but not oxygenating when on ventilator. left side white out on chest xray in OR with ET tube in right main stem.  Per nursing/Dr Soto bronchoscopy in OR with adequate placement per anesthesiology, no procedure note currently.  Repeat intubation without improvement in hypoxia.  No secretions noted.  Critical care consulted by neurosurgery.  Patient now alert and oriented and on room air sating adequately.  I have ordered a repeat chest xray.  Per bedside us per myself adequate LVEF, right ventricle not dilated and fluid status adequate.  A lines upper chest with some scattered b lines lower chest.  No pleural effusion. \"    1/26 -room air, no distress, transfer out of ICU, plan for surgery in the coming days  1/30 -ayo altered moves all extremities but not following commands, question seizure placed on cEEG and Neuro consulted, MRI today, remains on Decadron    Interval Problem Update  Reviewed last 24 hour events:  q 1 neuor checks  Moves all; not following  SR, SBP 90 - 110, goal < 140  NPO  I/O = 3.0/2.8  Decreasing UOP    Art line  Sub galeal hemovac  MRI today  No abx, ppx  keppra 1000 bid  Decadron 10 q 6  Glu   Lantus 35  Lispro, SSI      Review of Systems  Review of Systems   Constitutional: Negative for chills, diaphoresis, fever, malaise/fatigue and weight loss.   HENT: Negative for congestion and sinus pain.    Eyes: Negative for blurred vision, double vision and photophobia.   Respiratory: Negative for cough, hemoptysis, sputum production, shortness of breath, wheezing and stridor.    Cardiovascular: Negative for chest pain, palpitations and leg swelling. "   Gastrointestinal: Negative for blood in stool, heartburn, melena and vomiting.   Genitourinary: Negative for dysuria and urgency.   Musculoskeletal: Negative for back pain, myalgias and neck pain.   Skin: Negative for itching and rash.   Neurological: Positive for headaches. Negative for dizziness, tingling, sensory change, speech change and focal weakness.   Endo/Heme/Allergies: Negative for polydipsia. Does not bruise/bleed easily.   Psychiatric/Behavioral: Negative for depression. The patient is not nervous/anxious.         Vital Signs for last 24 hours   Temp:  [35.6 °C (96 °F)-37.4 °C (99.3 °F)] 37.1 °C (98.8 °F)  Pulse:  [] 93  Resp:  [12-38] 16  BP: ()/(61-76) 117/64  SpO2:  [87 %-98 %] 97 %    Hemodynamic parameters for last 24 hours       Respiratory Information for the last 24 hours       Physical Exam   Physical Exam  Vitals signs and nursing note reviewed.   Constitutional:       General: She is not in acute distress.     Appearance: She is not ill-appearing, toxic-appearing or diaphoretic.   HENT:      Head: Normocephalic and atraumatic.      Right Ear: External ear normal.      Left Ear: External ear normal.      Nose: No congestion or rhinorrhea.      Mouth/Throat:      Mouth: Mucous membranes are moist.      Pharynx: No oropharyngeal exudate or posterior oropharyngeal erythema.   Eyes:      General: No scleral icterus.     Extraocular Movements: Extraocular movements intact.      Conjunctiva/sclera: Conjunctivae normal.      Pupils: Pupils are equal, round, and reactive to light.   Neck:      Musculoskeletal: Neck supple. No neck rigidity or muscular tenderness.   Cardiovascular:      Rate and Rhythm: Normal rate and regular rhythm.      Pulses: Normal pulses.      Heart sounds: Normal heart sounds. No murmur.   Pulmonary:      Effort: No respiratory distress.      Breath sounds: No wheezing.   Abdominal:      General: There is no distension.      Palpations: There is no mass.       Tenderness: There is no abdominal tenderness. There is no guarding.   Musculoskeletal:         General: No swelling or tenderness.      Right lower leg: No edema.      Left lower leg: No edema.   Lymphadenopathy:      Cervical: No cervical adenopathy.   Skin:     Coloration: Skin is not jaundiced or pale.      Findings: No bruising, erythema, lesion or rash.   Neurological:      Comments: GCS 9. Brainstem reflexes intact. Localizes to nox stim. Moans   Psychiatric:         Mood and Affect: Mood normal.         Behavior: Behavior normal.         Medications  Current Facility-Administered Medications   Medication Dose Route Frequency Provider Last Rate Last Admin   • dexamethasone (DECADRON) injection 10 mg  10 mg Intravenous Q6HRS Jono Qureshi, A.P.N.   10 mg at 01/30/21 0522   • Pharmacy Consult Request ...Pain Management Review 1 Each  1 Each Other PHARMACY TO DOSE Jono Qureshi, A.P.N.       • MD ALERT...DO NOT ADMINISTER NSAIDS or ASPIRIN unless ORDERED By Neurosurgery 1 Each  1 Each Other PRN Jono Qureshi, A.P.N.       • dexamethasone (DECADRON) injection 4 mg  4 mg Intravenous Once PRN Jono Qureshi, A.P.N.       • diphenhydrAMINE (BENADRYL) injection 25 mg  25 mg Intravenous Q6HRS PRN Jono Qureshi, A.P.N.       • scopolamine (TRANSDERM-SCOP) patch 1 Patch  1 Patch Transdermal Q72HRS PRN Jono Qureshi, A.P.N.       • labetalol (NORMODYNE/TRANDATE) injection 10 mg  10 mg Intravenous Q HOUR PRN Jono Qureshi, A.P.N.       • hydrALAZINE (APRESOLINE) injection 10 mg  10 mg Intravenous Q HOUR PRN Jono Qureshi, A.P.N.       • cloNIDine (CATAPRES) tablet 0.1 mg  0.1 mg Oral Q4HRS PRN Jono Qureshi, A.P.N.       • niCARdipine (CARDENE) 25 mg in  mL Infusion  0-15 mg/hr Intravenous Continuous Jono Qureshi, A.P.N.   Stopped at 01/29/21 1315   • artificial tears (EYE LUBRICANT) ophth ointment 1 Application  1 Application Both Eyes Q8HRS Jono Qureshi, A.P.N.   1 Application at  01/30/21 0520   • acetaminophen (Tylenol) tablet 650 mg  650 mg Oral Q6HRS PRN Jono Qureshi, A.P.N.       • oxyCODONE immediate-release (ROXICODONE) tablet 5 mg  5 mg Oral Q3HRS PRN Jono Qureshi, A.P.N.       • oxyCODONE immediate release (ROXICODONE) tablet 10 mg  10 mg Oral Q3HRS PRN Jono Qureshi, A.P.N.       • morphine (pf) 4 mg/mL injection 4 mg  4 mg Intravenous Q3HRS PRN Jono Qureshi, A.P.N.       • ondansetron (ZOFRAN) syringe/vial injection 4 mg  4 mg Intravenous Q4HRS PRN Jono Qureshi, A.P.N.       • NS infusion   Intravenous Continuous Jono Qureshi, A.P.N. 100 mL/hr at 01/29/21 1450 New Bag at 01/29/21 1450   • LORazepam (ATIVAN) injection 2 mg  2 mg Intravenous Q5 MIN PRN Clive Monteamyor D.O.       • levETIRAcetam (Keppra) 1000 mg in 100 mL NaCl IV premix  1,000 mg Intravenous Q12HRS Luke Soto M.D.   Stopped at 01/30/21 0535   • insulin glargine (Lantus) injection  35 Units Subcutaneous DAILY AT NOON Rene Gonzalez M.D.       • insulin lispro (HumaLOG) injection  0.2 Units/kg/day Subcutaneous TID AC Rene Gonzalez M.D.   6 Units at 01/30/21 0752   • insulin lispro (HumaLOG) injection  3-14 Units Subcutaneous 4X/DAY KYAW Pollack M.D.   3 Units at 01/30/21 0750    And   • glucose 4 g chewable tablet 16 g  16 g Oral Q15 MIN PRN Princess Pollack M.D.        And   • dextrose 50% (D50W) injection 50 mL  50 mL Intravenous Q15 MIN PRN Princess Pollack M.D.       • sodium chloride (SALT) tablet 1 g  1 g Oral Q8HRS Malik Sierra M.D.   Stopped at 01/29/21 1400   • senna-docusate (PERICOLACE or SENOKOT S) 8.6-50 MG per tablet 2 Tab  2 Tab Oral BID Win Scanlon M.D.   Stopped at 01/29/21 1800    And   • polyethylene glycol/lytes (MIRALAX) PACKET 1 Packet  1 Packet Oral QDAY PRN Win Scanlon M.D.        And   • magnesium hydroxide (MILK OF MAGNESIA) suspension 30 mL  30 mL Oral QDAY PRN Win Scanlon M.D.        And   • bisacodyl (DULCOLAX) suppository 10 mg  10 mg Rectal QDAY PRN  Win Scanlon M.D.       • ondansetron (ZOFRAN ODT) dispertab 4 mg  4 mg Oral Q4HRS PRN Win Scanlon M.D.       • promethazine (PHENERGAN) tablet 12.5-25 mg  12.5-25 mg Oral Q4HRS PRN Win Scanlon M.D.       • promethazine (PHENERGAN) suppository 12.5-25 mg  12.5-25 mg Rectal Q4HRS PRN Win Scanlon M.D.       • prochlorperazine (COMPAZINE) injection 5-10 mg  5-10 mg Intravenous Q4HRS PRN Win Scanlon M.D.           Fluids    Intake/Output Summary (Last 24 hours) at 1/30/2021 0825  Last data filed at 1/30/2021 0800  Gross per 24 hour   Intake 3276.67 ml   Output 2805 ml   Net 471.67 ml       Laboratory          Recent Labs     01/28/21  0102 01/29/21  0305 01/30/21  0442   SODIUM 132* 134* 137   POTASSIUM 3.7 4.5 4.1   CHLORIDE 97 99 104   CO2 25 27 27   BUN 19 20 13   CREATININE 0.54 0.59 0.47*   CALCIUM 8.9 8.9 7.8*     Recent Labs     01/28/21 0102 01/29/21  0305 01/30/21  0442   ALTSGPT  --  11  --    ASTSGOT  --  7*  --    ALKPHOSPHAT  --  46  --    TBILIRUBIN  --  0.5  --    GLUCOSE 263* 257* 189*     Recent Labs     01/29/21  0305 01/30/21  0442   WBC 13.5* 26.6*   ASTSGOT 7*  --    ALTSGPT 11  --    ALKPHOSPHAT 46  --    TBILIRUBIN 0.5  --      Recent Labs     01/29/21  0305 01/30/21  0442   RBC 4.86 4.33   HEMOGLOBIN 15.3 13.7   HEMATOCRIT 44.2 40.7   PLATELETCT 313 325   PROTHROMBTM 13.0  --    INR 0.96  --        Imaging  X-Ray:  I have personally reviewed the images and compared with prior images.    Assessment/Plan  * Meningioma (HCC)- (present on admission)  Assessment & Plan  S/p resection with Dr. Soto today  Neurochecks and VS per protocol  SBP < 150      Seizure (HCC)  Assessment & Plan  GTC ~ 1 min  Abated with Lorazepam  Loaded with Keppra increased  Stat EEG to evaluate for subclinical seizures.    Hypoxia  Assessment & Plan  S/p seizure some desats  Protecting airway  Ween NRB      Diabetes mellitus (HCC)  Assessment & Plan  Poorly controlled, likely hemoglobin 9.7  Exacerbated by current steroid  use  Increase Lantus, continue SSI    Obesity (BMI 30.0-34.9)  Assessment & Plan  Likely contributory to pulmonary risk for surgery  No history of albina diagnosis  Possible underlying pulmonary htn  ECHO ordered       Update:  Neurology consulted, cEEG in progress  Decadron  Keppra  MRI brain today  Glycemic control  Start tube feed  Follow in ICU  BP control    I have performed a physical exam and reviewed and updated ROS and Plan today (1/30/2021). In review of yesterday's note (1/29/2021), there are no changes except as documented above.     The patient remains critically ill.  Critical care time = 33 minutes in directly providing and coordinating critical care and extensive data review.  No time overlap and excludes procedures.

## 2021-01-30 NOTE — PROCEDURES
ROUTINE ELECTROENCEPHALOGRAM REPORT      Referring provider: Dr. Clive Montemayor    DOS: 1/29/2021 (total recording of 25 minutes)    INDICATION:  Marni Muñoz 45 y.o. female presenting with seizure    CURRENT ANTIEPILEPTIC REGIMEN: Keppra    TECHNIQUE: 30 channel routine electroencephalogram (EEG) was performed in accordance with the international 10-20 system. The study was reviewed in bipolar and referential montages. The recording examined the patient during wakeful and drowsy/sleep state(s).     DESCRIPTION OF THE RECORD:  The EEG in the most alert state shows a 5 to 6 Hz activity over the posterior head regions.  There is evidence of left hemispheric focal slowing with 4 to 5 Hz theta and 2 to 3 Hz delta as well as loss of faster frequencies more pronounced over the left frontal central region.  Generalized slowing with 5 to 6 Hz theta is seen.  There is evidence of a breach effect over the left frontocentral region.  Attenuations of background activity are seen occasionally.  No definite epileptiform discharges or subclinical seizures were seen.  There is reactivity in the tracing.    ACTIVATION PROCEDURES:   None performed.    EKG: sampling of the EKG recording demonstrated sinus rhythm.       INTERPRETATION:    This is an abnormal EEG due to presence of slowing of the posterior dominant rhythm, and a mild degree of generalized slowing, along with brief attenuations of background activity.  These findings are consistent with a mild to moderate encephalopathy of nonspecific etiology.  Left hemispheric focal slowing with loss of faster frequencies underlying a breach rhythm would be consistent with a structural lesion involving the white matter of this region and may be seen with the patient's history of craniotomy in this area.  No epileptiform discharges or subclinical seizures were seen during this tracing.    Mario Ng M.D., Diplomat of the American Board of Psychiatry and Neurology  Diplomat  of [facility]BP[employer] Epilepsy Subspecialty   Assistant Clinical Professor, [facility] Neurology Consultant

## 2021-01-30 NOTE — PROGRESS NOTES
At about 1530 I heard patient saud.  I walked to patient's room and she was noted to be having a tonic clonic seizure.  Patient was rolled onto her side while ativan was being retrieved.  During this time the patient stopped breathing and oxygen saturations dropped into the 40s.  A bag mask valve was used to adequately ventilate the patient.  A pulse was palpated and Arterial line showed normotensive BP.  Ativan was administered and seizure activity stopped.  The entire seizure lasted roughly 1-2 minutes in length.  Nonrebreather at 15L was placed on patient to maintain oxygen saturations.  Dr. Soto notified of event and orders for Keppra 1000mg BID given.  In addition, intensivist was consulted and patient was seen by Dr. Contreras at bedside.  Orders for EEG given.  Close monitoring of patient will continue to occur.

## 2021-01-30 NOTE — PROGRESS NOTES
Pt slow to wake up than had gtc sz    CT just before sz showed no mass effect, no signif hemorrhage, no stroke    After sz, pt given ativan and inc keppra to 1000bid  Attempts to open eyes  Perrl, conjugate  Localizes bilat ue equally    Discussed with Dr. Sierra and nurse  eeg pending  Mri tomorrow

## 2021-01-30 NOTE — PROGRESS NOTES
Pt down to MRI on the monitor with ACLS RN and MRI tech. PRN Ativan taken with patient prn seizure.

## 2021-01-30 NOTE — PROCEDURES
CONTINUOUS VIDEO ELECTROENCEPHALOGRAM REPORT      Referring provider: Dr. Valladares.     DOS:   1/30/2021 (recorded for 17 hrs and 14 minutes).       INDICATION:  Marni Muñoz 45 y.o. female presenting with s/p craniotomy, seizure.     CURRENT ANTIEPILEPTIC REGIMEN: Levetiracetam 1000 mg q 12 hrs.      TECHNIQUE: A 30-channel, continuous video electroencephalogram (VEEG) was performed in accordance with the international 10-20 system. This digital study was reviewed in bipolar and referential montages. The recording examined the patient during wakeful, drowsy and sleep states.     The EEG was set up and taken down by a Neurodiagnostic technologist who performed education to patient and staff.     A minimum but not limited to 23 electrodes and 23 channel recording was setup and performed by Neurodiagnostic technologist.    Impedances, electrode integrity, and technical impressions were documented a minimum of every 2-24 hour period by a Neurodiagnostic Technologist and reviewed by Interpreting physician.     DESCRIPTION OF THE RECORD:  During the awake state, background shows symmetrical 4-5 Hz delta - theta activity posteriorly with amplitude of 70 mV.  There was no reactivity with eye opening/closure.  There was loss of the normal anterior-posterior gradient .  During drowsiness, high-amplitude delta frequencies were seen.    During the sleep state, background shows diffuse high-amplitude 1-2 Hz delta activity.        ACTIVATION PROCEDURES:   Not performed.     ICTAL AND/OR INTERICTAL FINDINGS:   There is slowing and rare sharps noted in the left frontotemporal region. Continuous triphasic waves noted, intermittently these exhibited a brief rhythmic or periodic pattern. No seizures were recorded during the study.     EVENT(S):  None.     EKG: sampling review of EKG recording demonstrated sinus rhythm.       INTERPRETATION:   This is an abnormal continuous video electroencephalogram recording in the awake, drowsy and  sleep state. A moderate to severe, toxic / metabolic encephalopathy is suggested. Continuous triphasic waves noted, intermittently these exhibited a brief generalized rhythmic or periodic pattern (GPDs).There is slowing and rare sharps noted in the left frontotemporal region, suggestive of an underlying area of increased cortical irritability and structural abnormality. The patient may be at high risk for seizures, however no clear seizures were captured during the study. Clinical and radiological correlation is recommended.    Updates provided to Dr. Darryl Johnson MD   Epilepsy and Neurodiagnostics.   Clinical  of Neurology [facility] of [facility].   Diplomate in Neurology, Epilepsy, and Electrodiagnostic Medicine.   Office: [phone]  Fax: [phone]

## 2021-01-30 NOTE — CONSULTS
Hospital Neurology Consult:    Referring Physician: Janene Burns M.D.    Reason for consultation: Suspicion of subclinical seizures    HPI: Marni Muñoz is a 45 y.o. female with history of migraine and meningioma s/p resection presenting to the hospital for meningioma resection and consulted for possible subclinical seizures. Patient had resection of meningioma on 1/29/21. She had a seizure post-surgery, and since then she has been altered and has not returned to baseline. EEG was done on 1/29/21 which did not show seizure activity however patient has not returned to baseline, so neurology was consulted for further evaluation.    ROS:     As above. All other systems reviewed and are negative.    Past Medical History:    has a past medical history of Migraine.    FHx:  family history includes Arterial Aneurysm in her father.    SHx:   reports that she has been smoking cigarettes. She has a 5.50 pack-year smoking history. She has never used smokeless tobacco. She reports previous alcohol use. She reports that she does not use drugs.    Allergies:  Allergies   Allergen Reactions   • Amoxicillin Rash   • Food      bananas   • Potassium      Itchy and swollen lips       Medications:    Current Facility-Administered Medications:   •  insulin glargine (Lantus) injection, 35 Units, Subcutaneous, Q EVENING, Malik Sierra M.D.  •  dexamethasone (DECADRON) injection 10 mg, 10 mg, Intravenous, Q6HRS, Jono Qureshi, A.P.N., 10 mg at 01/30/21 1206  •  Pharmacy Consult Request ...Pain Management Review 1 Each, 1 Each, Other, PHARMACY TO DOSE, Jono Qureshi, A.P.N.  •  MD ALERT...DO NOT ADMINISTER NSAIDS or ASPIRIN unless ORDERED By Neurosurgery 1 Each, 1 Each, Other, PRN, Jono Qureshi, A.P.N.  •  dexamethasone (DECADRON) injection 4 mg, 4 mg, Intravenous, Once PRN, Jono Qureshi, A.P.N.  •  diphenhydrAMINE (BENADRYL) injection 25 mg, 25 mg, Intravenous, Q6HRS PRN, Jono Qureshi, A.P.N.  •  scopolamine  (TRANSDERM-SCOP) patch 1 Patch, 1 Patch, Transdermal, Q72HRS PRN, Jono Qureshi, A.P.N.  •  labetalol (NORMODYNE/TRANDATE) injection 10 mg, 10 mg, Intravenous, Q HOUR PRN, Jono Qureshi, A.P.N.  •  hydrALAZINE (APRESOLINE) injection 10 mg, 10 mg, Intravenous, Q HOUR PRN, Jono Qureshi, A.P.N.  •  cloNIDine (CATAPRES) tablet 0.1 mg, 0.1 mg, Oral, Q4HRS PRN, Jono Qureshi, A.P.N.  •  niCARdipine (CARDENE) 25 mg in  mL Infusion, 0-15 mg/hr, Intravenous, Continuous, Jono Qureshi, A.P.N., Stopped at 01/29/21 1315  •  artificial tears (EYE LUBRICANT) ophth ointment 1 Application, 1 Application, Both Eyes, Q8HRS, Jono Qureshi, A.P.N., 1 Application at 01/30/21 0520  •  acetaminophen (Tylenol) tablet 650 mg, 650 mg, Oral, Q6HRS PRN, Jono Qureshi, A.P.N.  •  oxyCODONE immediate-release (ROXICODONE) tablet 5 mg, 5 mg, Oral, Q3HRS PRN, Jono Qureshi, A.P.N.  •  oxyCODONE immediate release (ROXICODONE) tablet 10 mg, 10 mg, Oral, Q3HRS PRN, Jono Qureshi, A.P.N.  •  morphine (pf) 4 mg/mL injection 4 mg, 4 mg, Intravenous, Q3HRS PRN, Jono Qureshi, A.P.N., 4 mg at 01/30/21 1001  •  ondansetron (ZOFRAN) syringe/vial injection 4 mg, 4 mg, Intravenous, Q4HRS PRN, Jono Qureshi, A.P.N.  •  NS infusion, , Intravenous, Continuous, Jono Qureshi, A.P.N., Last Rate: 100 mL/hr at 01/29/21 1450, New Bag at 01/29/21 1450  •  LORazepam (ATIVAN) injection 2 mg, 2 mg, Intravenous, Q5 MIN PRN, Clive Montemayor D.O.  •  levETIRAcetam (Keppra) 1000 mg in 100 mL NaCl IV premix, 1,000 mg, Intravenous, Q12HRS, Luke Soto M.D., Stopped at 01/30/21 0535  •  insulin lispro (HumaLOG) injection, 0.2 Units/kg/day, Subcutaneous, TID AC, Rene Gonzalez M.D., 6 Units at 01/30/21 1127  •  insulin lispro (HumaLOG) injection, 3-14 Units, Subcutaneous, 4X/DAY ACHS, 3 Units at 01/30/21 1125 **AND** POC Blood Glucose, , , Q AC AND BEDTIME(S) **AND** NOTIFY MD and PharmD, , , Once **AND** glucose 4 g chewable  tablet 16 g, 16 g, Oral, Q15 MIN PRN **AND** dextrose 50% (D50W) injection 50 mL, 50 mL, Intravenous, Q15 MIN PRN, Princess Pollack M.D.  •  sodium chloride (SALT) tablet 1 g, 1 g, Oral, Q8HRS, Malik Sierra M.D., Stopped at 01/29/21 1400  •  senna-docusate (PERICOLACE or SENOKOT S) 8.6-50 MG per tablet 2 Tab, 2 Tab, Oral, BID, Stopped at 01/29/21 1800 **AND** polyethylene glycol/lytes (MIRALAX) PACKET 1 Packet, 1 Packet, Oral, QDAY PRN **AND** magnesium hydroxide (MILK OF MAGNESIA) suspension 30 mL, 30 mL, Oral, QDAY PRN **AND** bisacodyl (DULCOLAX) suppository 10 mg, 10 mg, Rectal, QDAY PRN, Win Scanlon M.D.  •  ondansetron (ZOFRAN ODT) dispertab 4 mg, 4 mg, Oral, Q4HRS PRN, Win Scanlon M.D.  •  promethazine (PHENERGAN) tablet 12.5-25 mg, 12.5-25 mg, Oral, Q4HRS PRN, Win Scanlon M.D.  •  promethazine (PHENERGAN) suppository 12.5-25 mg, 12.5-25 mg, Rectal, Q4HRS PRN, Win Scanlon M.D.  •  prochlorperazine (COMPAZINE) injection 5-10 mg, 5-10 mg, Intravenous, Q4HRS PRN, Win Scanlon M.D.    Vitals:   Vitals:    01/30/21 1001 01/30/21 1019 01/30/21 1033 01/30/21 1100   BP: 113/68 (!) 99/69 104/66 105/67   Pulse: (!) 130 (!) 153 96 89   Resp: (!) 22 (!) 22 (!) 22 16   Temp:       TempSrc:       SpO2: 94% 94% 92% 93%   Weight:       Height:           Labs:  Lab Results   Component Value Date/Time    PROTHROMBTM 13.0 01/29/2021 03:05 AM    INR 0.96 01/29/2021 03:05 AM      Lab Results   Component Value Date/Time    WBC 26.6 (H) 01/30/2021 04:42 AM    RBC 4.33 01/30/2021 04:42 AM    HEMOGLOBIN 13.7 01/30/2021 04:42 AM    HEMATOCRIT 40.7 01/30/2021 04:42 AM    MCV 94.0 01/30/2021 04:42 AM    MCH 31.6 01/30/2021 04:42 AM    MCHC 33.7 01/30/2021 04:42 AM    MPV 10.4 01/30/2021 04:42 AM    NEUTSPOLYS 79.10 (H) 01/26/2021 04:24 AM    LYMPHOCYTES 13.20 (L) 01/26/2021 04:24 AM    MONOCYTES 7.00 01/26/2021 04:24 AM    EOSINOPHILS 0.00 01/26/2021 04:24 AM    BASOPHILS 0.10 01/26/2021 04:24 AM      Lab Results   Component Value Date/Time     SODIUM 137 01/30/2021 04:42 AM    POTASSIUM 4.1 01/30/2021 04:42 AM    CHLORIDE 104 01/30/2021 04:42 AM    CO2 27 01/30/2021 04:42 AM    GLUCOSE 189 (H) 01/30/2021 04:42 AM    BUN 13 01/30/2021 04:42 AM    CREATININE 0.47 (L) 01/30/2021 04:42 AM          Lab Results   Component Value Date/Time    ALKPHOSPHAT 46 01/29/2021 03:05 AM    ASTSGOT 7 (L) 01/29/2021 03:05 AM    ALTSGPT 11 01/29/2021 03:05 AM    TBILIRUBIN 0.5 01/29/2021 03:05 AM      Imaging/Testing:  MRI Brain W/WO CST on 1/30/21 personally reviewed. Meningioma is extracted, but some blood products remain in head.     CT Head W/O CST on 1/29/21 reviewed in chart.     Physical Exam:     General: 44 y/o female in bed in NAD  Cardio: Normal S1/S2. No peripheral edema.   Pulm: CTAX2. No respiratory distress.   Skin: Warm, dry, no rashes or lesions   Psychiatric: Appropriate affect. No active psychosis.  HEENT: Atraumatic head, normal sclera and conjunctiva, moist oral mucosa. No lid lag. Surgical scar on head w/ drain.   Abdomen: Soft, non tender. No masses or hepatosplenomegaly.    Neurologic:  Mental Status:  Somnolent, minimally arousable. Unable to follow commands. Groans, no speech.   Cranial Nerves:  PERRL. EOMi. Face symmetric  Motor:  Normal muscle tone and bulk. Moves all 4 extremities to nox stimulus.  Reflexes:  Deferred  Coordination: Unable to assess  Sensation: Grimaces/withdraws to nox stim throughout  Gait/Station: Deferred    Assessment/Plan:    Marni Muñoz is a 45 y.o. female with history of migraine and meningioma s/p resection presenting to the hospital for meningioma resection and consulted for possible subclinical seizures. Patient remains altered post surgery. Previous EEG did not show seizures however given that the patient has not returned to baseline would be reasonable to obtain cEEG overnight to r/o seizure activity. Will not increase antiseizure medications until we know if there are seizures occurring.      Plan:  -cEEG  -Continue current ASD medications.  -Will follow w/ cEEG  -Plan discussed with consulting physician and patient's nurse.       Mario Ng M.D., Diplomat of the American Board of Psychiatry and Neurology  Diplomat of [facility] Epilepsy Subspecialty   Assistant Clinical Professor, [facility] Neurology Consultant

## 2021-01-30 NOTE — EEG PROGRESS NOTE
Patient was hooked for continuous EEG with CT compatible leads. These are NOT compatible for MRI.

## 2021-01-31 LAB
ANION GAP SERPL CALC-SCNC: 5 MMOL/L (ref 7–16)
BUN SERPL-MCNC: 13 MG/DL (ref 8–22)
CALCIUM SERPL-MCNC: 7.4 MG/DL (ref 8.5–10.5)
CHLORIDE SERPL-SCNC: 103 MMOL/L (ref 96–112)
CO2 SERPL-SCNC: 25 MMOL/L (ref 20–33)
CREAT SERPL-MCNC: 0.38 MG/DL (ref 0.5–1.4)
CRP SERPL HS-MCNC: 2.56 MG/DL (ref 0–0.75)
ERYTHROCYTE [DISTWIDTH] IN BLOOD BY AUTOMATED COUNT: 41.2 FL (ref 35.9–50)
GLUCOSE BLD-MCNC: 208 MG/DL (ref 65–99)
GLUCOSE BLD-MCNC: 221 MG/DL (ref 65–99)
GLUCOSE BLD-MCNC: 276 MG/DL (ref 65–99)
GLUCOSE BLD-MCNC: 276 MG/DL (ref 65–99)
GLUCOSE SERPL-MCNC: 272 MG/DL (ref 65–99)
HCT VFR BLD AUTO: 35.4 % (ref 37–47)
HGB BLD-MCNC: 11.5 G/DL (ref 12–16)
MCH RBC QN AUTO: 30.9 PG (ref 27–33)
MCHC RBC AUTO-ENTMCNC: 32.5 G/DL (ref 33.6–35)
MCV RBC AUTO: 95.2 FL (ref 81.4–97.8)
PLATELET # BLD AUTO: 273 K/UL (ref 164–446)
PMV BLD AUTO: 10.6 FL (ref 9–12.9)
POTASSIUM SERPL-SCNC: 4.7 MMOL/L (ref 3.6–5.5)
PREALB SERPL-MCNC: 26.1 MG/DL (ref 18–38)
RBC # BLD AUTO: 3.72 M/UL (ref 4.2–5.4)
SODIUM SERPL-SCNC: 133 MMOL/L (ref 135–145)
WBC # BLD AUTO: 18.6 K/UL (ref 4.8–10.8)

## 2021-01-31 PROCEDURE — 700111 HCHG RX REV CODE 636 W/ 250 OVERRIDE (IP): Performed by: PSYCHIATRY & NEUROLOGY

## 2021-01-31 PROCEDURE — A9270 NON-COVERED ITEM OR SERVICE: HCPCS | Performed by: INTERNAL MEDICINE

## 2021-01-31 PROCEDURE — 700111 HCHG RX REV CODE 636 W/ 250 OVERRIDE (IP): Performed by: CLINICAL NURSE SPECIALIST

## 2021-01-31 PROCEDURE — 700105 HCHG RX REV CODE 258: Performed by: PSYCHIATRY & NEUROLOGY

## 2021-01-31 PROCEDURE — 700102 HCHG RX REV CODE 250 W/ 637 OVERRIDE(OP): Performed by: INTERNAL MEDICINE

## 2021-01-31 PROCEDURE — 84134 ASSAY OF PREALBUMIN: CPT

## 2021-01-31 PROCEDURE — 700111 HCHG RX REV CODE 636 W/ 250 OVERRIDE (IP): Performed by: NEUROLOGICAL SURGERY

## 2021-01-31 PROCEDURE — 700102 HCHG RX REV CODE 250 W/ 637 OVERRIDE(OP): Performed by: CLINICAL NURSE SPECIALIST

## 2021-01-31 PROCEDURE — A9270 NON-COVERED ITEM OR SERVICE: HCPCS | Performed by: CLINICAL NURSE SPECIALIST

## 2021-01-31 PROCEDURE — 770022 HCHG ROOM/CARE - ICU (200)

## 2021-01-31 PROCEDURE — 80048 BASIC METABOLIC PNL TOTAL CA: CPT

## 2021-01-31 PROCEDURE — 82962 GLUCOSE BLOOD TEST: CPT | Mod: 91

## 2021-01-31 PROCEDURE — 86140 C-REACTIVE PROTEIN: CPT

## 2021-01-31 PROCEDURE — 700105 HCHG RX REV CODE 258: Performed by: CLINICAL NURSE SPECIALIST

## 2021-01-31 PROCEDURE — C9254 INJECTION, LACOSAMIDE: HCPCS | Performed by: PSYCHIATRY & NEUROLOGY

## 2021-01-31 PROCEDURE — 99291 CRITICAL CARE FIRST HOUR: CPT | Performed by: INTERNAL MEDICINE

## 2021-01-31 PROCEDURE — 85027 COMPLETE CBC AUTOMATED: CPT

## 2021-01-31 PROCEDURE — 99232 SBSQ HOSP IP/OBS MODERATE 35: CPT | Performed by: PSYCHIATRY & NEUROLOGY

## 2021-01-31 RX ORDER — SODIUM CHLORIDE 1 G/1
1 TABLET ORAL EVERY 8 HOURS
Status: DISCONTINUED | OUTPATIENT
Start: 2021-01-31 | End: 2021-02-04 | Stop reason: HOSPADM

## 2021-01-31 RX ORDER — ONDANSETRON 4 MG/1
4 TABLET, ORALLY DISINTEGRATING ORAL EVERY 4 HOURS PRN
Status: DISCONTINUED | OUTPATIENT
Start: 2021-01-31 | End: 2021-02-04 | Stop reason: HOSPADM

## 2021-01-31 RX ORDER — INSULIN GLARGINE 100 [IU]/ML
15 INJECTION, SOLUTION SUBCUTANEOUS EVERY EVENING
Status: DISCONTINUED | OUTPATIENT
Start: 2021-01-31 | End: 2021-02-01

## 2021-01-31 RX ADMIN — INSULIN LISPRO 6 UNITS: 100 INJECTION, SOLUTION INTRAVENOUS; SUBCUTANEOUS at 07:57

## 2021-01-31 RX ADMIN — MINERAL OIL, PETROLATUM 1 APPLICATION: 425; 573 OINTMENT OPHTHALMIC at 14:36

## 2021-01-31 RX ADMIN — DOCUSATE SODIUM 50 MG AND SENNOSIDES 8.6 MG 2 TABLET: 8.6; 5 TABLET, FILM COATED ORAL at 05:38

## 2021-01-31 RX ADMIN — LEVETIRACETAM INJECTION 1000 MG: 10 INJECTION INTRAVENOUS at 05:28

## 2021-01-31 RX ADMIN — SODIUM CHLORIDE: 9 INJECTION, SOLUTION INTRAVENOUS at 21:44

## 2021-01-31 RX ADMIN — INSULIN LISPRO 4 UNITS: 100 INJECTION, SOLUTION INTRAVENOUS; SUBCUTANEOUS at 17:00

## 2021-01-31 RX ADMIN — INSULIN LISPRO 6 UNITS: 100 INJECTION, SOLUTION INTRAVENOUS; SUBCUTANEOUS at 16:55

## 2021-01-31 RX ADMIN — INSULIN LISPRO 4 UNITS: 100 INJECTION, SOLUTION INTRAVENOUS; SUBCUTANEOUS at 21:34

## 2021-01-31 RX ADMIN — ACETAMINOPHEN 650 MG: 325 TABLET ORAL at 21:41

## 2021-01-31 RX ADMIN — INSULIN LISPRO 7 UNITS: 100 INJECTION, SOLUTION INTRAVENOUS; SUBCUTANEOUS at 07:59

## 2021-01-31 RX ADMIN — SODIUM CHLORIDE 100 MG: 9 INJECTION, SOLUTION INTRAVENOUS at 21:33

## 2021-01-31 RX ADMIN — DEXAMETHASONE SODIUM PHOSPHATE 10 MG: 4 INJECTION, SOLUTION INTRA-ARTICULAR; INTRALESIONAL; INTRAMUSCULAR; INTRAVENOUS; SOFT TISSUE at 05:37

## 2021-01-31 RX ADMIN — DOCUSATE SODIUM 50 MG AND SENNOSIDES 8.6 MG 2 TABLET: 8.6; 5 TABLET, FILM COATED ORAL at 17:32

## 2021-01-31 RX ADMIN — SODIUM CHLORIDE: 9 INJECTION, SOLUTION INTRAVENOUS at 09:57

## 2021-01-31 RX ADMIN — INSULIN GLARGINE 15 UNITS: 100 INJECTION, SOLUTION SUBCUTANEOUS at 17:32

## 2021-01-31 RX ADMIN — Medication 1 G: at 05:38

## 2021-01-31 RX ADMIN — INSULIN LISPRO 6 UNITS: 100 INJECTION, SOLUTION INTRAVENOUS; SUBCUTANEOUS at 11:00

## 2021-01-31 RX ADMIN — DEXAMETHASONE SODIUM PHOSPHATE 10 MG: 4 INJECTION, SOLUTION INTRA-ARTICULAR; INTRALESIONAL; INTRAMUSCULAR; INTRAVENOUS; SOFT TISSUE at 23:49

## 2021-01-31 RX ADMIN — Medication 1 G: at 21:30

## 2021-01-31 RX ADMIN — INSULIN LISPRO 7 UNITS: 100 INJECTION, SOLUTION INTRAVENOUS; SUBCUTANEOUS at 11:45

## 2021-01-31 RX ADMIN — DEXAMETHASONE SODIUM PHOSPHATE 10 MG: 4 INJECTION, SOLUTION INTRA-ARTICULAR; INTRALESIONAL; INTRAMUSCULAR; INTRAVENOUS; SOFT TISSUE at 00:10

## 2021-01-31 RX ADMIN — DEXAMETHASONE SODIUM PHOSPHATE 10 MG: 4 INJECTION, SOLUTION INTRA-ARTICULAR; INTRALESIONAL; INTRAMUSCULAR; INTRAVENOUS; SOFT TISSUE at 21:31

## 2021-01-31 RX ADMIN — DEXAMETHASONE SODIUM PHOSPHATE 10 MG: 4 INJECTION, SOLUTION INTRA-ARTICULAR; INTRALESIONAL; INTRAMUSCULAR; INTRAVENOUS; SOFT TISSUE at 14:35

## 2021-01-31 RX ADMIN — OXYCODONE 5 MG: 5 TABLET ORAL at 15:26

## 2021-01-31 RX ADMIN — Medication 1 G: at 14:37

## 2021-01-31 RX ADMIN — LEVETIRACETAM INJECTION 1000 MG: 10 INJECTION INTRAVENOUS at 17:34

## 2021-01-31 RX ADMIN — SODIUM CHLORIDE 100 MG: 9 INJECTION, SOLUTION INTRAVENOUS at 11:10

## 2021-01-31 ASSESSMENT — FIBROSIS 4 INDEX: FIB4 SCORE: 0.31

## 2021-01-31 ASSESSMENT — PAIN DESCRIPTION - PAIN TYPE
TYPE: ACUTE PAIN
TYPE: ACUTE PAIN

## 2021-01-31 NOTE — PROGRESS NOTES
Neurosurgery Progress Note    Subjective:  No sz on eeg, neurology consulted    Exam:  Opens eyes spont  Wernicke's aphasia  PERRL, conjugate  Face symm, tongue midline  F/c x4 intermittently, grossly equal left and right         BP  Min: 89/52  Max: 113/68  Pulse  Av  Min: 62  Max: 153  Resp  Av.7  Min: 12  Max: 38  Temp  Av.9 °C (98.5 °F)  Min: 36 °C (96.8 °F)  Max: 37.2 °C (99 °F)  SpO2  Av.2 %  Min: 87 %  Max: 95 %    No data recorded    Recent Labs     21  03021  0442 21  0450   WBC 13.5* 26.6* 18.6*   RBC 4.86 4.33 3.72*   HEMOGLOBIN 15.3 13.7 11.5*   HEMATOCRIT 44.2 40.7 35.4*   MCV 90.9 94.0 95.2   MCH 31.5 31.6 30.9   MCHC 34.6 33.7 32.5*   RDW 39.2 41.6 41.2   PLATELETCT 313 325 273   MPV 10.7 10.4 10.6     Recent Labs     21  030212 21  0450   SODIUM 134* 137 133*   POTASSIUM 4.5 4.1 4.7   CHLORIDE 99 104 103   CO2 27 27 25   GLUCOSE 257* 189* 272*   BUN 20 13 13   CREATININE 0.59 0.47* 0.38*   CALCIUM 8.9 7.8* 7.4*     Recent Labs     21  030   INR 0.96           Intake/Output       21 07 - 21 0659 21 0700 - 21 0659      8422-7610 6210-4653 Total 5478-2246 3701-1542 Total       Intake    I.V.  1200  1200 2400  --  -- --    Volume (mL) (NS infusion) 1200 1200 2400 -- -- --    NG/GT  --  150 150  55  -- 55    Intake (mL) (Enteral Tube 21 Right nare) -- 150 150 55 -- 55    IV Piggyback  100  100 200  --  -- --    Volume (mL) (levETIRAcetam (Keppra) 1000 mg in 100 mL NaCl IV premix) 100 100 200 -- -- --    Total Intake 1300 1450 2750 55 -- 55       Output    Urine  380  750 1130  500  -- 500    Output (mL) (Urethral Catheter Temperature probe 16 Fr.)  500 -- 500    Drains  70  90 160  --  -- --    Output (mL) (Closed/Suction Drain 1 Anterior;Left Scalp Hemovac) 70 90 160 -- -- --    Stool  --  -- --  --  -- --    Number of Times Stooled 0 x -- 0 x -- -- --    Total Output  500 --  500       Net I/O      -445 -- -445            Intake/Output Summary (Last 24 hours) at 1/31/2021 0943  Last data filed at 1/31/2021 0900  Gross per 24 hour   Intake 2605 ml   Output 1725 ml   Net 880 ml            • lacosamide (VIMPAT) ivpb  100 mg Q12HRS   • insulin glargine  15 Units Q EVENING   • Pharmacy  1 Each PHARMACY TO DOSE   • senna-docusate  2 Tab BID    And   • polyethylene glycol/lytes  1 Packet QDAY PRN    And   • magnesium hydroxide  30 mL QDAY PRN    And   • bisacodyl  10 mg QDAY PRN   • acetaminophen  650 mg Q6HRS PRN   • cloNIDine  0.1 mg Q4HRS PRN   • oxyCODONE immediate release  10 mg Q3HRS PRN   • oxyCODONE immediate-release  5 mg Q3HRS PRN   • promethazine  12.5-25 mg Q4HRS PRN   • dexamethasone  10 mg Q6HRS   • Pharmacy Consult Request  1 Each PHARMACY TO DOSE   • MD ALERT...DO NOT ADMINISTER NSAIDS or ASPIRIN unless ORDERED By Neurosurgery  1 Each PRN   • dexamethasone  4 mg Once PRN   • diphenhydrAMINE  25 mg Q6HRS PRN   • scopolamine  1 Patch Q72HRS PRN   • labetalol  10 mg Q HOUR PRN   • hydrALAZINE  10 mg Q HOUR PRN   • niCARdipine infusion  0-15 mg/hr Continuous   • artificial tears  1 Application Q8HRS   • morphine injection  4 mg Q3HRS PRN   • ondansetron  4 mg Q4HRS PRN   • NS   Continuous   • LORazepam  2 mg Q5 MIN PRN   • levETIRAcetam (Keppra) IV  1,000 mg Q12HRS   • insulin lispro  0.2 Units/kg/day TID AC   • insulin lispro  3-14 Units 4X/DAY ACHS    And   • glucose  16 g Q15 MIN PRN    And   • dextrose 50%  50 mL Q15 MIN PRN   • sodium chloride  1 g Q8HRS   • ondansetron  4 mg Q4HRS PRN   • promethazine  12.5-25 mg Q4HRS PRN   • prochlorperazine  5-10 mg Q4HRS PRN       Assessment and Plan:  Hospital day #9 left parietal meningioma  POD #2, left craniotomy  Prophylactic anticoagulation: hold         Start date/time: pt ambulatory  Appreciate neurology help with management; no sz on eeg  keppra 1000 BID  Continue Dec 10Q6  Neuro checks Q1  Hyperglycemia - on lantus,  regular insulin  Bowel protocol- no BM, + flatus, will give mag citrate  Keep hemovac  MRI 1/30 shows no stroke, no venus congestion or edema next to resection cavity   Keep in icu today

## 2021-01-31 NOTE — PROGRESS NOTES
[facility] Neurology Progress Note:     Interval History: No acute events overnight. No complaints today. Unable to obtain ROS due to encephalopathy/somnolence.     Objective:   Vitals:    01/31/21 0400 01/31/21 0500 01/31/21 0600 01/31/21 0700   BP: 100/57 106/57 111/73 105/61   Pulse: 68 72 90 69   Resp: 16 14 (!) 38 (!) 26   Temp: 36.4 °C (97.6 °F)  36.7 °C (98.1 °F) 36 °C (96.8 °F)   TempSrc: Axillary   Temporal   SpO2: 93% 93% 92%    Weight: 95.3 kg (210 lb 1.6 oz)      Height:           Labs:     Lab Results   Component Value Date/Time    PROTHROMBTM 13.0 01/29/2021 03:05 AM    INR 0.96 01/29/2021 03:05 AM      Lab Results   Component Value Date/Time    WBC 18.6 (H) 01/31/2021 04:50 AM    RBC 3.72 (L) 01/31/2021 04:50 AM    HEMOGLOBIN 11.5 (L) 01/31/2021 04:50 AM    HEMATOCRIT 35.4 (L) 01/31/2021 04:50 AM    MCV 95.2 01/31/2021 04:50 AM    MCH 30.9 01/31/2021 04:50 AM    MCHC 32.5 (L) 01/31/2021 04:50 AM    MPV 10.6 01/31/2021 04:50 AM    NEUTSPOLYS 79.10 (H) 01/26/2021 04:24 AM    LYMPHOCYTES 13.20 (L) 01/26/2021 04:24 AM    MONOCYTES 7.00 01/26/2021 04:24 AM    EOSINOPHILS 0.00 01/26/2021 04:24 AM    BASOPHILS 0.10 01/26/2021 04:24 AM      Lab Results   Component Value Date/Time    SODIUM 133 (L) 01/31/2021 04:50 AM    POTASSIUM 4.7 01/31/2021 04:50 AM    CHLORIDE 103 01/31/2021 04:50 AM    CO2 25 01/31/2021 04:50 AM    GLUCOSE 272 (H) 01/31/2021 04:50 AM    BUN 13 01/31/2021 04:50 AM    CREATININE 0.38 (L) 01/31/2021 04:50 AM          Lab Results   Component Value Date/Time    ALKPHOSPHAT 46 01/29/2021 03:05 AM    ASTSGOT 7 (L) 01/29/2021 03:05 AM    ALTSGPT 11 01/29/2021 03:05 AM    TBILIRUBIN 0.5 01/29/2021 03:05 AM        Imaging/Testing:   cEEG from 1/30/21 through 1/31/21 reviewed w/ interpreting neurologists. Sharps coming from craniotomy site, encephalopathy, but no seizures.     Physical Exam:      General: 44 y/o female in bed in NAD  Cardio: Normal S1/S2. No peripheral edema.   Pulm: CTAX2. No  respiratory distress.   Skin: Warm, dry, no rashes or lesions   Psychiatric: Appropriate affect. No active psychosis.  HEENT: Atraumatic head, normal sclera and conjunctiva, moist oral mucosa. No lid lag. Surgical scar on head w/ drain.   Abdomen: Soft, non tender. No masses or hepatosplenomegaly.     Neurologic:  Mental Status:  Somnolent, arousable. Follows simple commands. Groans, no speech.   Cranial Nerves:  PERRL. EOMi. Face symmetric  Motor:  Normal muscle tone and bulk. Moves all 4 extremities to nox stimulus.  Reflexes:  Deferred  Coordination: Unable to assess  Sensation: Grimaces/withdraws to nox stim throughout  Gait/Station: Deferred    Seen and examined on 1/31/21 and compared to 1/30/21 patient now easier to arouse, follows commands      Assessment/Plan:    Marni Muñoz is a 45 y.o. female with history of migraine and meningioma s/p resection presenting to the hospital for meningioma resection and consulted for possible subclinical seizures. Patient remains altered post surgery. 24 hour EEG did not show seizure but showed epileptiform discharges arising from craniotomy site. Will add Vimpat to be cautious, however no seizures were observed. It's likely encephalopathy is due to recent surgery/seizure/prolonged post-ictal state.     Plan:  -Continue Keppra 1000mg BID.  -Vimpat 100mg BID.  -D/C cEEG  -Repeat EEG if patient worsens or fails to continue improving.  -No further recommendations at this time, please call w/ further questions or concerns.   -Plan discussed with consulting physician and patient's nurse.     Mario Ng M.D., Diplomat of the American Board of Psychiatry and Neurology  Diplomat of [facility] Epilepsy Subspecialty   Assistant Clinical Professor, [facility] Neurology Consultant

## 2021-01-31 NOTE — DIETARY
Nutrition Support Assessment: Marni Muñoz is a 45 y.o. female with admitting DX of meningioma.     Current problem list:  1. Seizure  2. S/p crani 1/29  3. Hypoxia  4. Not alert enough for po diet     Assessment:  Estimated Nutritional Needs: based on:   Height: 167.6 cm   Weight: 95.7 kg   Weight to Use in Calculations: 96 kg   Ideal Body Weight: 59.1 kg   Body mass index is 34.07 kg/m².  Pertinent Medical History: migraines, chin, steroid induced DM?    Calculation/Equation: MSJ x 1.0 = 1613 x (0.6 - 0.7) = 968 - 1129; 11-14 kcals/kg = 1056 - 1344  Total Calories / day: 1400 - 1600 (Calories / kg: 15 - 17)  Total Grams Protein / day: 77 - 89 (Grams Protein / kg IBW: 1.3 - 1.5)     Evaluation:   1. TF appropriate as evidenced by pt is not alert enough to take po diet and is s/p crani  2. Pt does not have Cortrak tube yet  3. Labs: glu 161 - 195, WBC 26.6  4. Last BM: 1/28  5. Meds/fluids: decadron  6. Considered obesity guidelines when assessing pt d/t BMI >30  7. Fibersource will not meet est pro needs at goal kcal level.  Replete can meet needs but pt with hyperglycemia r/t steroids and this impairs healing. Pt does not really need a specialized TF formula at this time + suspect she will be able to take po diet soon.  Diabetisource can meet needs       Malnutrition Risk: suspect low d/t pt denied poor po or wt loss PTA     Recommendations/Plan:  1. Once Cortrak placement confirmed begin TF with Diabetisource at 25 mL/hr and advance per TF protocol to goal rate of 55 mL/hr.  This will provide 1584 kcals, 79 gm pro and 1082 mL free water per day.    2. RD will monitor nutrition parameters.

## 2021-01-31 NOTE — PROGRESS NOTES
Cortrak Placement    Tube Team verified patient name and medical record number prior to tube placement.  Cortrak tube (109 cm, 10 Fr) placed at 80 cm in right nare.  Per Cortrak picture, tube appears to be in the stomach.  Nursing Instructions: Awaiting KUB to confirm placement before use for medications or feeding. Once placement confirmed, flush tube with 30 ml of water, and then remove and save stylet, in patient medication drawer.       2345: Cortraann verified GASTRIC per XR.

## 2021-01-31 NOTE — CARE PLAN
Problem: Communication  Goal: The ability to communicate needs accurately and effectively will improve  Outcome: PROGRESSING SLOWER THAN EXPECTED     Problem: Knowledge Deficit  Goal: Knowledge of disease process/condition, treatment plan, diagnostic tests, and medications will improve  Outcome: PROGRESSING SLOWER THAN EXPECTED  Goal: Knowledge of the prescribed therapeutic regimen will improve  Outcome: PROGRESSING SLOWER THAN EXPECTED     Problem: Psychosocial Needs:  Goal: Level of anxiety will decrease  Outcome: PROGRESSING SLOWER THAN EXPECTED     Problem: Respiratory:  Goal: Respiratory status will improve  Outcome: PROGRESSING SLOWER THAN EXPECTED     Problem: Neuro Status  Goal: Monitor neuro status and rapid identification of neuro changes  Outcome: PROGRESSING SLOWER THAN EXPECTED

## 2021-01-31 NOTE — PROGRESS NOTES
"Critical Care Progress Note    Date of admission  1/21/2021    Chief Complaint  45 y.o. female transferred to ICU 1/25 with hypoxia after intubation for surgery.    Hospital Course  \"45 y.o. female who presented 1/21/2021 with large calcified meningioma, day 5 of hospitals and there is mass effect.  Planned surgery today but not oxygenating when on ventilator. left side white out on chest xray in OR with ET tube in right main stem.  Per nursing/Dr Soto bronchoscopy in OR with adequate placement per anesthesiology, no procedure note currently.  Repeat intubation without improvement in hypoxia.  No secretions noted.  Critical care consulted by neurosurgery.  Patient now alert and oriented and on room air sating adequately.  I have ordered a repeat chest xray.  Per bedside us per myself adequate LVEF, right ventricle not dilated and fluid status adequate.  A lines upper chest with some scattered b lines lower chest.  No pleural effusion. \"    1/26 -room air, no distress, transfer out of ICU, plan for surgery in the coming days  1/30 -ayo altered moves all extremities but not following commands, question seizure placed on cEEG and Neuro consulted, MRI today, remains on Decadron  1/31 -much more awake interactive and following commands, no seizure, discontinuing cEEG today    Interval Problem Update  Reviewed last 24 hour events:   More awake, follows; drowsey; no sz  BUE 3/5  BLE 3/5  6 lpm oxy mask  T-max 99  I/O = 2.7/1.3  -110s  SR 60-70  GCS 10  WBC 26.6-18.6  Glucose 218  vimpat started  Sadia TF at goal  ? Start VTE ppx  Calcium low; f/u iCa  Resume lantus 15  keppra  PT/OT, mobilize    Review of Systems  Review of Systems   Unable to perform ROS: Mental status change        Vital Signs for last 24 hours   Temp:  [36 °C (96.8 °F)-37.2 °C (99 °F)] 36 °C (96.8 °F)  Pulse:  [] 69  Resp:  [12-38] 26  BP: ()/(52-74) 105/61  SpO2:  [87 %-97 %] 92 %    Hemodynamic parameters for last 24 hours   "     Respiratory Information for the last 24 hours       Physical Exam   Physical Exam  Vitals signs and nursing note reviewed.   Constitutional:       General: She is not in acute distress.  HENT:      Head:      Comments: Craniotomy incision dressed     Nose: No rhinorrhea.      Mouth/Throat:      Mouth: Mucous membranes are moist.   Eyes:      Extraocular Movements: Extraocular movements intact.      Pupils: Pupils are equal, round, and reactive to light.   Neck:      Musculoskeletal: Neck supple. No neck rigidity.   Cardiovascular:      Rate and Rhythm: Normal rate and regular rhythm.      Pulses: Normal pulses.   Pulmonary:      Effort: No respiratory distress.      Breath sounds: No wheezing.   Abdominal:      General: There is no distension.      Palpations: There is no mass.      Tenderness: There is no abdominal tenderness. There is no guarding.   Musculoskeletal:         General: No swelling, tenderness or deformity.   Skin:     General: Skin is warm and dry.   Neurological:      Comments: Brainstem reflexes intact. Localizes to nox stim. Moans, not following         Medications  Current Facility-Administered Medications   Medication Dose Route Frequency Provider Last Rate Last Admin   • insulin glargine (Lantus) injection  35 Units Subcutaneous Q EVENING Malik Sierra M.D.   Stopped at 01/30/21 1800   • Pharmacy Consult: Enteral tube insertion - review meds/change route/product selection  1 Each Other PHARMACY TO DOSE Malik Sierra M.D.       • senna-docusate (PERICOLACE or SENOKOT S) 8.6-50 MG per tablet 2 Tab  2 Tab Enteral Tube BID Malik Sierra M.D.   2 Tab at 01/31/21 0538    And   • polyethylene glycol/lytes (MIRALAX) PACKET 1 Packet  1 Packet Enteral Tube QDAY PRN Malik Sierra M.D.        And   • magnesium hydroxide (MILK OF MAGNESIA) suspension 30 mL  30 mL Enteral Tube QDAY PRN Malik Sierra M.D.        And   • bisacodyl (DULCOLAX) suppository 10 mg  10 mg Rectal QDAY PRN Malik ALVARADO  LUCILA Sierra       • acetaminophen (Tylenol) tablet 650 mg  650 mg Enteral Tube Q6HRS PRN Malik Sierra M.D.       • cloNIDine (CATAPRES) tablet 0.1 mg  0.1 mg Enteral Tube Q4HRS PRN Malik Sierra M.D.       • oxyCODONE immediate release (ROXICODONE) tablet 10 mg  10 mg Enteral Tube Q3HRS PRN Malik Sierra M.D.       • oxyCODONE immediate-release (ROXICODONE) tablet 5 mg  5 mg Enteral Tube Q3HRS PRN Malik Sierra M.D.       • promethazine (PHENERGAN) tablet 12.5-25 mg  12.5-25 mg Enteral Tube Q4HRS PRN Malik Sierra M.D.       • dexamethasone (DECADRON) injection 10 mg  10 mg Intravenous Q6HRS Jonojanes Qureshi, A.P.N.   10 mg at 01/31/21 0537   • Pharmacy Consult Request ...Pain Management Review 1 Each  1 Each Other PHARMACY TO DOSE Jono Qureshi, A.P.N.       • MD ALERT...DO NOT ADMINISTER NSAIDS or ASPIRIN unless ORDERED By Neurosurgery 1 Each  1 Each Other PRN Jono Qureshi, A.P.N.       • dexamethasone (DECADRON) injection 4 mg  4 mg Intravenous Once PRN Jono Qureshi, A.P.N.       • diphenhydrAMINE (BENADRYL) injection 25 mg  25 mg Intravenous Q6HRS PRN Jono Qureshi, A.P.N.       • scopolamine (TRANSDERM-SCOP) patch 1 Patch  1 Patch Transdermal Q72HRS PRN Jono Qureshi, A.P.N.       • labetalol (NORMODYNE/TRANDATE) injection 10 mg  10 mg Intravenous Q HOUR PRN Jono Qureshi, A.P.N.       • hydrALAZINE (APRESOLINE) injection 10 mg  10 mg Intravenous Q HOUR PRN Jono Qureshi, A.P.N.       • niCARdipine (CARDENE) 25 mg in  mL Infusion  0-15 mg/hr Intravenous Continuous Jono Qureshi, A.P.N.   Stopped at 01/29/21 1315   • artificial tears (EYE LUBRICANT) ophth ointment 1 Application  1 Application Both Eyes Q8HRS Jono Qureshi, A.P.N.   1 Application at 01/30/21 0520   • morphine (pf) 4 mg/mL injection 4 mg  4 mg Intravenous Q3HRS PRN Jono Qureshi, A.P.N.   4 mg at 01/30/21 2221   • ondansetron (ZOFRAN) syringe/vial injection 4 mg  4 mg Intravenous Q4HRS PRN Jono  ART Qureshi.P.N.       • NS infusion   Intravenous Continuous Jono ART Qureshi.P.N. 100 mL/hr at 01/29/21 1450 New Bag at 01/29/21 1450   • LORazepam (ATIVAN) injection 2 mg  2 mg Intravenous Q5 MIN PRN Clive Montemayor D.O.       • levETIRAcetam (Keppra) 1000 mg in 100 mL NaCl IV premix  1,000 mg Intravenous Q12HRS Luke Soto M.D.   Stopped at 01/31/21 0543   • insulin lispro (HumaLOG) injection  0.2 Units/kg/day Subcutaneous TID PADMINI Gonzalez M.D.   Stopped at 01/30/21 1700   • insulin lispro (HumaLOG) injection  3-14 Units Subcutaneous 4X/DAY KYAW Pollack M.D.   4 Units at 01/30/21 2150    And   • glucose 4 g chewable tablet 16 g  16 g Oral Q15 MIN PRN Princess Pollack M.D.        And   • dextrose 50% (D50W) injection 50 mL  50 mL Intravenous Q15 MIN PRN Princess Pollack M.D.       • sodium chloride (SALT) tablet 1 g  1 g Oral Q8HRS Malik Sierra M.D.   1 g at 01/31/21 0538   • ondansetron (ZOFRAN ODT) dispertab 4 mg  4 mg Oral Q4HRS PRN Win Scanlon M.D.       • promethazine (PHENERGAN) suppository 12.5-25 mg  12.5-25 mg Rectal Q4HRS PRN Win Scanlon M.D.       • prochlorperazine (COMPAZINE) injection 5-10 mg  5-10 mg Intravenous Q4HRS PRN Win Scanlon M.D.           Fluids    Intake/Output Summary (Last 24 hours) at 1/31/2021 0739  Last data filed at 1/31/2021 0700  Gross per 24 hour   Intake 2750 ml   Output 1415 ml   Net 1335 ml       Laboratory          Recent Labs     01/29/21  0305 01/30/21  0442 01/31/21  0450   SODIUM 134* 137 133*   POTASSIUM 4.5 4.1 4.7   CHLORIDE 99 104 103   CO2 27 27 25   BUN 20 13 13   CREATININE 0.59 0.47* 0.38*   CALCIUM 8.9 7.8* 7.4*     Recent Labs     01/29/21 0305 01/30/21 0442 01/31/21 0450   ALTSGPT 11  --   --    ASTSGOT 7*  --   --    ALKPHOSPHAT 46  --   --    TBILIRUBIN 0.5  --   --    PREALBUMIN  --   --  26.1   GLUCOSE 257* 189* 272*     Recent Labs     01/29/21 0305 01/30/21 0442 01/31/21 0450   WBC 13.5* 26.6* 18.6*   ASTSGOT 7*  --   --     ALTSGPT 11  --   --    ALKPHOSPHAT 46  --   --    TBILIRUBIN 0.5  --   --      Recent Labs     01/29/21  0305 01/30/21  0442 01/31/21  0450   RBC 4.86 4.33 3.72*   HEMOGLOBIN 15.3 13.7 11.5*   HEMATOCRIT 44.2 40.7 35.4*   PLATELETCT 313 325 273   PROTHROMBTM 13.0  --   --    INR 0.96  --   --        Imaging  X-Ray:  I have personally reviewed the images and compared with prior images.    Assessment/Plan  * Meningioma (HCC)- (present on admission)  Assessment & Plan  S/p resection with Dr. Soto today  Neurochecks and VS per protocol  SBP < 150      Seizure (HCC)  Assessment & Plan  GTC ~ 1 min  Abated with Lorazepam  Loaded with Keppra increased  Stat EEG to evaluate for subclinical seizures.    Hypoxia  Assessment & Plan  S/p seizure some desats  Protecting airway  Ween NRB      Diabetes mellitus (HCC)  Assessment & Plan  Poorly controlled, likely hemoglobin 9.7  Exacerbated by current steroid use  Increase Lantus, continue SSI    Obesity (BMI 30.0-34.9)  Assessment & Plan  Likely contributory to pulmonary risk for surgery  No history of albina diagnosis  Possible underlying pulmonary htn  ECHO ordered       Update:  Neuro status improving, awake and interactive today, no seizure  Stopping continuous EEG today, ongoing antibiotics per neurology  Glycemic control, resume Lantus  Continue enteral nutrition  PT/OT/SLP      I have performed a physical exam and reviewed and updated ROS and Plan today (1/31/2021). In review of yesterday's note (1/30/2021), there are no changes except as documented above.     The patient remains critically ill.  Critical care time = 32 minutes in directly providing and coordinating critical care and extensive data review.  No time overlap and excludes procedures.

## 2021-01-31 NOTE — CARE PLAN
Problem: Respiratory:  Goal: Respiratory status will improve  Outcome: NOT MET     Problem: Safety  Goal: Will remain free from injury  Outcome: PROGRESSING AS EXPECTED  Goal: Will remain free from falls  Outcome: PROGRESSING AS EXPECTED     Problem: Infection  Goal: Will remain free from infection  Outcome: PROGRESSING AS EXPECTED     Problem: Venous Thromboembolism (VTW)/Deep Vein Thrombosis (DVT) Prevention:  Goal: Patient will participate in Venous Thrombosis (VTE)/Deep Vein Thrombosis (DVT)Prevention Measures  Outcome: PROGRESSING AS EXPECTED     Problem: Bowel/Gastric:  Goal: Normal bowel function is maintained or improved  Outcome: PROGRESSING AS EXPECTED  Goal: Will not experience complications related to bowel motility  Outcome: PROGRESSING AS EXPECTED     Problem: Discharge Barriers/Planning  Goal: Patient's continuum of care needs will be met  Outcome: PROGRESSING AS EXPECTED     Problem: Pain Management  Goal: Pain level will decrease to patient's comfort goal  Outcome: PROGRESSING AS EXPECTED     Problem: Psychosocial Needs:  Goal: Level of anxiety will decrease  Outcome: PROGRESSING AS EXPECTED     Problem: Craniotomy surgery  Goal: Post op care of the craniotomy patient  Outcome: PROGRESSING AS EXPECTED     Problem: Neuro Status  Goal: Monitor neuro status and rapid identification of neuro changes  Outcome: PROGRESSING AS EXPECTED     Problem: Skin Integrity  Goal: Risk for impaired skin integrity will decrease  Outcome: PROGRESSING AS EXPECTED     Problem: Communication  Goal: The ability to communicate needs accurately and effectively will improve  Outcome: PROGRESSING SLOWER THAN EXPECTED     Problem: Knowledge Deficit  Goal: Knowledge of disease process/condition, treatment plan, diagnostic tests, and medications will improve  Outcome: PROGRESSING SLOWER THAN EXPECTED  Goal: Knowledge of the prescribed therapeutic regimen will improve  Outcome: PROGRESSING SLOWER THAN EXPECTED     Problem:  Mobility  Goal: Risk for activity intolerance will decrease  Outcome: PROGRESSING SLOWER THAN EXPECTED

## 2021-02-01 LAB
ANION GAP SERPL CALC-SCNC: 8 MMOL/L (ref 7–16)
BUN SERPL-MCNC: 17 MG/DL (ref 8–22)
CA-I SERPL-SCNC: 1.1 MMOL/L (ref 1.1–1.3)
CALCIUM SERPL-MCNC: 7.9 MG/DL (ref 8.5–10.5)
CHLORIDE SERPL-SCNC: 100 MMOL/L (ref 96–112)
CO2 SERPL-SCNC: 24 MMOL/L (ref 20–33)
CREAT SERPL-MCNC: 0.4 MG/DL (ref 0.5–1.4)
ERYTHROCYTE [DISTWIDTH] IN BLOOD BY AUTOMATED COUNT: 40.3 FL (ref 35.9–50)
GLUCOSE BLD-MCNC: 284 MG/DL (ref 65–99)
GLUCOSE BLD-MCNC: 292 MG/DL (ref 65–99)
GLUCOSE BLD-MCNC: 320 MG/DL (ref 65–99)
GLUCOSE BLD-MCNC: 350 MG/DL (ref 65–99)
GLUCOSE BLD-MCNC: 396 MG/DL (ref 65–99)
GLUCOSE BLD-MCNC: 408 MG/DL (ref 65–99)
GLUCOSE SERPL-MCNC: 365 MG/DL (ref 65–99)
HCT VFR BLD AUTO: 36.5 % (ref 37–47)
HGB BLD-MCNC: 12.2 G/DL (ref 12–16)
MCH RBC QN AUTO: 31.8 PG (ref 27–33)
MCHC RBC AUTO-ENTMCNC: 33.4 G/DL (ref 33.6–35)
MCV RBC AUTO: 95.1 FL (ref 81.4–97.8)
PLATELET # BLD AUTO: 271 K/UL (ref 164–446)
PMV BLD AUTO: 10.6 FL (ref 9–12.9)
POTASSIUM SERPL-SCNC: 4.5 MMOL/L (ref 3.6–5.5)
RBC # BLD AUTO: 3.84 M/UL (ref 4.2–5.4)
SODIUM SERPL-SCNC: 132 MMOL/L (ref 135–145)
WBC # BLD AUTO: 12.9 K/UL (ref 4.8–10.8)

## 2021-02-01 PROCEDURE — 700111 HCHG RX REV CODE 636 W/ 250 OVERRIDE (IP): Performed by: CLINICAL NURSE SPECIALIST

## 2021-02-01 PROCEDURE — 700102 HCHG RX REV CODE 250 W/ 637 OVERRIDE(OP): Performed by: INTERNAL MEDICINE

## 2021-02-01 PROCEDURE — 80048 BASIC METABOLIC PNL TOTAL CA: CPT

## 2021-02-01 PROCEDURE — 700102 HCHG RX REV CODE 250 W/ 637 OVERRIDE(OP): Performed by: CLINICAL NURSE SPECIALIST

## 2021-02-01 PROCEDURE — 700105 HCHG RX REV CODE 258: Performed by: PSYCHIATRY & NEUROLOGY

## 2021-02-01 PROCEDURE — A9270 NON-COVERED ITEM OR SERVICE: HCPCS | Performed by: INTERNAL MEDICINE

## 2021-02-01 PROCEDURE — 92610 EVALUATE SWALLOWING FUNCTION: CPT

## 2021-02-01 PROCEDURE — 700111 HCHG RX REV CODE 636 W/ 250 OVERRIDE (IP): Performed by: NEUROLOGICAL SURGERY

## 2021-02-01 PROCEDURE — 85027 COMPLETE CBC AUTOMATED: CPT

## 2021-02-01 PROCEDURE — A9270 NON-COVERED ITEM OR SERVICE: HCPCS | Performed by: CLINICAL NURSE SPECIALIST

## 2021-02-01 PROCEDURE — 99233 SBSQ HOSP IP/OBS HIGH 50: CPT | Performed by: INTERNAL MEDICINE

## 2021-02-01 PROCEDURE — 97161 PT EVAL LOW COMPLEX 20 MIN: CPT

## 2021-02-01 PROCEDURE — 82330 ASSAY OF CALCIUM: CPT

## 2021-02-01 PROCEDURE — 97166 OT EVAL MOD COMPLEX 45 MIN: CPT

## 2021-02-01 PROCEDURE — 700111 HCHG RX REV CODE 636 W/ 250 OVERRIDE (IP): Performed by: PSYCHIATRY & NEUROLOGY

## 2021-02-01 PROCEDURE — 770022 HCHG ROOM/CARE - ICU (200)

## 2021-02-01 PROCEDURE — 700101 HCHG RX REV CODE 250: Performed by: INTERNAL MEDICINE

## 2021-02-01 PROCEDURE — C9254 INJECTION, LACOSAMIDE: HCPCS | Performed by: PSYCHIATRY & NEUROLOGY

## 2021-02-01 PROCEDURE — 82962 GLUCOSE BLOOD TEST: CPT

## 2021-02-01 RX ORDER — INSULIN GLARGINE 100 [IU]/ML
20 INJECTION, SOLUTION SUBCUTANEOUS EVERY EVENING
Status: DISCONTINUED | OUTPATIENT
Start: 2021-02-01 | End: 2021-02-02

## 2021-02-01 RX ORDER — DEXAMETHASONE SODIUM PHOSPHATE 4 MG/ML
10 INJECTION, SOLUTION INTRA-ARTICULAR; INTRALESIONAL; INTRAMUSCULAR; INTRAVENOUS; SOFT TISSUE EVERY 8 HOURS
Status: DISCONTINUED | OUTPATIENT
Start: 2021-02-01 | End: 2021-02-02

## 2021-02-01 RX ORDER — INSULIN GLARGINE 100 [IU]/ML
5 INJECTION, SOLUTION SUBCUTANEOUS ONCE
Status: COMPLETED | OUTPATIENT
Start: 2021-02-01 | End: 2021-02-01

## 2021-02-01 RX ORDER — DEXTROSE MONOHYDRATE 25 G/50ML
50 INJECTION, SOLUTION INTRAVENOUS
Status: DISCONTINUED | OUTPATIENT
Start: 2021-02-01 | End: 2021-02-04 | Stop reason: HOSPADM

## 2021-02-01 RX ORDER — DEXTROSE MONOHYDRATE 25 G/50ML
50 INJECTION, SOLUTION INTRAVENOUS
Status: DISCONTINUED | OUTPATIENT
Start: 2021-02-01 | End: 2021-02-01

## 2021-02-01 RX ORDER — FLUDROCORTISONE ACETATE 0.1 MG/1
0.1 TABLET ORAL
Status: DISCONTINUED | OUTPATIENT
Start: 2021-02-01 | End: 2021-02-04 | Stop reason: HOSPADM

## 2021-02-01 RX ADMIN — INSULIN HUMAN 10 UNITS: 100 INJECTION, SOLUTION PARENTERAL at 18:19

## 2021-02-01 RX ADMIN — Medication 1 G: at 23:14

## 2021-02-01 RX ADMIN — FLUDROCORTISONE ACETATE 0.1 MG: 0.1 TABLET ORAL at 09:32

## 2021-02-01 RX ADMIN — SODIUM CHLORIDE 100 MG: 9 INJECTION, SOLUTION INTRAVENOUS at 20:28

## 2021-02-01 RX ADMIN — DOCUSATE SODIUM 50 MG AND SENNOSIDES 8.6 MG 2 TABLET: 8.6; 5 TABLET, FILM COATED ORAL at 05:17

## 2021-02-01 RX ADMIN — POLYETHYLENE GLYCOL 3350 1 PACKET: 17 POWDER, FOR SOLUTION ORAL at 18:10

## 2021-02-01 RX ADMIN — DEXAMETHASONE SODIUM PHOSPHATE 10 MG: 4 INJECTION, SOLUTION INTRA-ARTICULAR; INTRALESIONAL; INTRAMUSCULAR; INTRAVENOUS; SOFT TISSUE at 05:17

## 2021-02-01 RX ADMIN — DEXAMETHASONE SODIUM PHOSPHATE 10 MG: 4 INJECTION, SOLUTION INTRA-ARTICULAR; INTRALESIONAL; INTRAMUSCULAR; INTRAVENOUS; SOFT TISSUE at 16:01

## 2021-02-01 RX ADMIN — ACETAMINOPHEN 650 MG: 325 TABLET ORAL at 09:33

## 2021-02-01 RX ADMIN — FLUDROCORTISONE ACETATE 0.1 MG: 0.1 TABLET ORAL at 20:28

## 2021-02-01 RX ADMIN — Medication 1 G: at 16:00

## 2021-02-01 RX ADMIN — SODIUM CHLORIDE 100 MG: 9 INJECTION, SOLUTION INTRAVENOUS at 05:18

## 2021-02-01 RX ADMIN — INSULIN GLARGINE 5 UNITS: 100 INJECTION, SOLUTION SUBCUTANEOUS at 09:30

## 2021-02-01 RX ADMIN — DEXAMETHASONE SODIUM PHOSPHATE 10 MG: 4 INJECTION, SOLUTION INTRA-ARTICULAR; INTRALESIONAL; INTRAMUSCULAR; INTRAVENOUS; SOFT TISSUE at 23:14

## 2021-02-01 RX ADMIN — Medication 1 G: at 05:17

## 2021-02-01 RX ADMIN — INSULIN GLARGINE 20 UNITS: 100 INJECTION, SOLUTION SUBCUTANEOUS at 18:21

## 2021-02-01 RX ADMIN — INSULIN HUMAN 12 UNITS: 100 INJECTION, SOLUTION PARENTERAL at 12:52

## 2021-02-01 RX ADMIN — LEVETIRACETAM INJECTION 1000 MG: 10 INJECTION INTRAVENOUS at 18:10

## 2021-02-01 RX ADMIN — LEVETIRACETAM INJECTION 1000 MG: 10 INJECTION INTRAVENOUS at 05:17

## 2021-02-01 RX ADMIN — DOCUSATE SODIUM 50 MG AND SENNOSIDES 8.6 MG 2 TABLET: 8.6; 5 TABLET, FILM COATED ORAL at 18:10

## 2021-02-01 RX ADMIN — INSULIN LISPRO 6 UNITS: 100 INJECTION, SOLUTION INTRAVENOUS; SUBCUTANEOUS at 07:01

## 2021-02-01 RX ADMIN — INSULIN HUMAN 7 UNITS: 100 INJECTION, SOLUTION PARENTERAL at 09:29

## 2021-02-01 ASSESSMENT — ENCOUNTER SYMPTOMS
BRUISES/BLEEDS EASILY: 0
FEVER: 0
SENSORY CHANGE: 0
SEIZURES: 0
VOMITING: 0
SHORTNESS OF BREATH: 0
PALPITATIONS: 0
NERVOUS/ANXIOUS: 0
DIARRHEA: 0
BLURRED VISION: 0
NAUSEA: 0
ABDOMINAL PAIN: 0
COUGH: 0
SPUTUM PRODUCTION: 0
HEADACHES: 1
CHILLS: 0
FOCAL WEAKNESS: 0
BACK PAIN: 0
SPEECH CHANGE: 0
DOUBLE VISION: 0
SORE THROAT: 0

## 2021-02-01 ASSESSMENT — COGNITIVE AND FUNCTIONAL STATUS - GENERAL
STANDING UP FROM CHAIR USING ARMS: A LITTLE
CLIMB 3 TO 5 STEPS WITH RAILING: A LITTLE
MOVING FROM LYING ON BACK TO SITTING ON SIDE OF FLAT BED: A LITTLE
DAILY ACTIVITIY SCORE: 21
TOILETING: A LITTLE
SUGGESTED CMS G CODE MODIFIER MOBILITY: CK
SUGGESTED CMS G CODE MODIFIER DAILY ACTIVITY: CJ
DRESSING REGULAR LOWER BODY CLOTHING: A LITTLE
WALKING IN HOSPITAL ROOM: A LITTLE
TURNING FROM BACK TO SIDE WHILE IN FLAT BAD: A LITTLE
MOVING TO AND FROM BED TO CHAIR: A LITTLE
MOBILITY SCORE: 18
HELP NEEDED FOR BATHING: A LITTLE

## 2021-02-01 ASSESSMENT — ACTIVITIES OF DAILY LIVING (ADL): TOILETING: INDEPENDENT

## 2021-02-01 ASSESSMENT — GAIT ASSESSMENTS
GAIT LEVEL OF ASSIST: MINIMAL ASSIST
DEVIATION: BRADYKINETIC;INCREASED BASE OF SUPPORT
DISTANCE (FEET): 180

## 2021-02-01 NOTE — THERAPY
Physical Therapy   Initial Evaluation     Patient Name: Marni Muñoz  Age:  45 y.o., Sex:  female  Medical Record #: [id number]  Today's Date: 2/1/2021     Precautions: Fall Risk    Assessment  Patient is a 45 y.o. female admitted s/p L craniotomy for meningioma resection on 1/29, then with concern for subclinical seizures. Pt seen for PT evaluation at this time. Pt motivated to participate, required min A for safety throughout and ambulated with slow yocasta and short step lengths. Recommend PMR consult, at this time pt could benefit from postacute intensive therapy as she is very motivated and was previously active and independent. Pt does however have potential to progress to return home with friend during acute stay. Will follow.     Plan  Recommend Physical Therapy 4 times per week until therapy goals are met for the following treatments:  Bed Mobility, Gait Training, Neuro Re-Education / Balance, Self Care/Home Evaluation, Stair Training, Therapeutic Activities and Therapeutic Exercises  DC Equipment Recommendations: Unable to determine at this time  Discharge Recommendations: Recommend post-acute placement for additional physical therapy services prior to discharge home (recommend PMR consult. 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 no overt LOB, does reach out for objects to stabilize. hands-on assist for safety d/t first time ambulating   Bed Mobility    Supine to Sit Minimal Assist   Scooting Supervised   Functional Mobility   Sit to Stand Minimal Assist   Bed, Chair, WC Transfer Minimal Assist   Short Term Goals    Short Term Goal # 1 pt will perform supine <> sit without bed features with SPV in 6 visits to be able to get in/out of bed at home   Short Term Goal # 2 pt will perform all functional xfrs with SPV in 6 visits for improved independence   Short Term Goal # 3 pt will ambulate 500ft without AD with SPV in 6 visits to access home and community

## 2021-02-01 NOTE — PROGRESS NOTES
Neurosurgery Progress Note    Subjective:  Doing better, no sig HA  dobhoff  No sz activity    Exam:  Awake, ox 3, clear, blunt speech  PERRL, conjugate, EOMs intact  Face symm, tongue midline  Str 5/5, no drift  Inc c/d w/ hvac 65 bloody x8 hrs        BP  Min: 98/69  Max: 118/66  Pulse  Av.7  Min: 58  Max: 88  Resp  Av.3  Min: 10  Max: 68  Temp  Av.4 °C (97.5 °F)  Min: 36.2 °C (97.1 °F)  Max: 36.9 °C (98.4 °F)  SpO2  Av.8 %  Min: 92 %  Max: 96 %    No data recorded    Recent Labs     21  0520   WBC 26.6* 18.6* 12.9*   RBC 4.33 3.72* 3.84*   HEMOGLOBIN 13.7 11.5* 12.2   HEMATOCRIT 40.7 35.4* 36.5*   MCV 94.0 95.2 95.1   MCH 31.6 30.9 31.8   MCHC 33.7 32.5* 33.4*   RDW 41.6 41.2 40.3   PLATELETCT 325 273 271   MPV 10.4 10.6 10.6     Recent Labs     21  0520   SODIUM 137 133* 132*   POTASSIUM 4.1 4.7 4.5   CHLORIDE 104 103 100   CO2 27 25 24   GLUCOSE 189* 272* 365*   BUN 13 13 17   CREATININE 0.47* 0.38* 0.40*   CALCIUM 7.8* 7.4* 7.9*               Intake/Output       21 - 21 0659 21 - 21 0659      1534-6194 0287-0391 Total 3352-0521 2043-5574 Total       Intake    I.V.  --  2400 2400  --  -- --    Volume (mL) (NS infusion) -- 2400 2400 -- -- --    NG/GT  605  660 1265  --  -- --    Intake (mL) (Enteral Tube 21 Right nare)  -- -- --    IV Piggyback  --  520 520  --  -- --    Volume (mL) (lacosamide (VIMPAT) 100 mg in  mL ivpb) -- 420 420 -- -- --    Volume (mL) (levETIRAcetam (Keppra) 1000 mg in 100 mL NaCl IV premix) -- 100 100 -- -- --    Enteral  70  -- 70  --  -- --    Free Water / Tube Flush 70 -- 70 -- -- --    Total Intake 675 3580 4255 -- -- --       Output    Urine  1080  1370 2450  --  -- --    Output (mL) (Urethral Catheter Temperature probe 16 Fr.) 1080 1370 2450 -- -- --    Drains  60  65 125  --  -- --    Output (mL) (Closed/Suction Drain 1 Anterior;Left  Scalp Hemovac) 60 65 125 -- -- --    Stool  --  -- --  --  -- --    Number of Times Stooled -- 0 x 0 x -- -- --    Total Output 1140 1435 2575 -- -- --       Net I/O     -465 2145 1680 -- -- --            Intake/Output Summary (Last 24 hours) at 2/1/2021 0738  Last data filed at 2/1/2021 0618  Gross per 24 hour   Intake 4255 ml   Output 2450 ml   Net 1805 ml            • lacosamide (VIMPAT) ivpb  100 mg Q12HRS   • insulin glargine  15 Units Q EVENING   • ondansetron  4 mg Q4HRS PRN   • sodium chloride  1 g Q8HRS   • Pharmacy  1 Each PHARMACY TO DOSE   • senna-docusate  2 Tab BID    And   • polyethylene glycol/lytes  1 Packet QDAY PRN    And   • magnesium hydroxide  30 mL QDAY PRN    And   • bisacodyl  10 mg QDAY PRN   • acetaminophen  650 mg Q6HRS PRN   • cloNIDine  0.1 mg Q4HRS PRN   • oxyCODONE immediate release  10 mg Q3HRS PRN   • oxyCODONE immediate-release  5 mg Q3HRS PRN   • promethazine  12.5-25 mg Q4HRS PRN   • dexamethasone  10 mg Q6HRS   • Pharmacy Consult Request  1 Each PHARMACY TO DOSE   • MD ALERT...DO NOT ADMINISTER NSAIDS or ASPIRIN unless ORDERED By Neurosurgery  1 Each PRN   • dexamethasone  4 mg Once PRN   • diphenhydrAMINE  25 mg Q6HRS PRN   • scopolamine  1 Patch Q72HRS PRN   • labetalol  10 mg Q HOUR PRN   • hydrALAZINE  10 mg Q HOUR PRN   • artificial tears  1 Application Q8HRS   • morphine injection  4 mg Q3HRS PRN   • ondansetron  4 mg Q4HRS PRN   • NS   Continuous   • LORazepam  2 mg Q5 MIN PRN   • levETIRAcetam (Keppra) IV  1,000 mg Q12HRS   • insulin lispro  0.2 Units/kg/day TID AC   • insulin lispro  3-14 Units 4X/DAY ACHS    And   • dextrose 50%  50 mL Q15 MIN PRN   • promethazine  12.5-25 mg Q4HRS PRN   • prochlorperazine  5-10 mg Q4HRS PRN       Assessment and Plan:  Hospital day #10 left parietal meningioma  POD #3, left craniotomy  Prophylactic anticoagulation: hold         Start date/time: pt ambulatory    Appreciate neurology help with management; no sz on eeg  MRI 1/30 shows no  stroke, no venus congestion or edema next to resection cavity   keppra 1000 BID, vimpat  decrease Dec to 10Q8 today and wean off slowly over 2 weeks  Neuro checks Q2  Hyperglycemia - on lantus, regular insulin  hyponatremia- on salt tabs, start florinef  Bowel protocol  D/c hemovac  Pt/ot/mobilize  Keep in icu today    ATTENDING ADDENDUM:  Patient seen independently and agree with above note  Exam now nonfocal, speech fluent

## 2021-02-01 NOTE — THERAPY
"Speech Language Pathology   Clinical Swallow Evaluation     Patient Name: Marni Muñoz  AGE:  45 y.o., SEX:  female  Medical Record #: [id number]  Today's Date: 2/1/2021     Precautions  Precautions: (P) Fall Risk    Assessment    Patient is 45 y.o. female who presented 1/21/2021 with large calcified meningioma. Transferred to ICU 1/25 with hypoxia after intubation for surgery. Per CXR 1/26 \"Suspect subtle infiltrate at the left lung base.\" No previous SLP notes.    The patient was seen on this date for a clinical swallow evaluation. An oral mechanism exam was completed and found to be unremarkable. Vocal quality is clear, hyolaryngeal excursion was complete and timely and volitional cough was strong. The patient consumed trials as listed below. In summary the patient consumed all textures with no overt s/sx of aspiration. Mastication was functional and swallow trigger was timely across all textures. Recommend initiation of regular textured foods with thin liquids.     Plan    Recommend Speech Therapy for Evaluation only for the following treatments:  Dysphagia Training.    Discharge Recommendations: (P) Anticipate that the patient will have no further speech therapy needs after discharge from the hospital    Objective       02/01/21 1105   Oral Motor Eval    Is Patient Able to Complete Oral Motor Eval Yes, Within Normal Limits   Laryngeal Function   Voice Quality Within Functional Limits   Volutional Cough Within Functional Limits   Excursion Upon Swallow Complete   Oral Food Presentation   Single Swallow Thin (0) Within Functional Limits   Serial Swallow Thin (0) Within Functional Limits   Liquidised (3) Within Functional Limits   Minced & Moist (5) - (Dysphagia II) Within Functional Limits   Soft & Bite-Sized (6) - (Dysphagia III) Within Functional Limits   Regular (7) Within Functional Limits   Self Feeding Independent   Tracheostomy   Tracheostomy  No   Dysphagia Strategies / Recommendations   Strategies / " Interventions Recommended (Yes / No) Yes   Compensatory Strategies None   Diet / Liquid Recommendation Thin (0);Regular (7)   Medication Administration  Whole with Liquid Wash   Therapy Interventions Dysphagia Therapy By Speech Language Pathologist   Dysphagia Rating   Nutritional Liquid Intake Rating Scale Non thickened beverages   Nutritional Food Intake Rating Scale Total oral diet with no restrictions

## 2021-02-01 NOTE — THERAPY
Occupational Therapy   Initial Evaluation     Patient Name: Marni Muñoz  Age:  45 y.o., Sex:  female  Medical Record #: [id number]  Today's Date: 2/1/2021     Precautions  Precautions: Fall Risk    Assessment  Patient is 45 y.o. female with a diagnosis of Meningioma. Pt currently limited by decreased functional mobility, activity tolerance, balance, and pain which are affecting pt's ability to complete ADLs/IADLs at baseline. Pt would benefit from OT services in the acute care setting to maximize functional recovery.      Plan    Recommend Occupational Therapy 3 times per week until therapy goals are met for the following treatments:  Self Care/Activities of Daily Living and Therapeutic Activities.       Discharge Recommendations: (P) Anticipate that the patient will have no further occupational therapy needs after discharge from the hospital        02/01/21 1708   Prior Living Situation   Prior Services Home-Independent   Housing / Facility 1 Story House   Steps Into Home 0   Steps In Home 0   Equipment Owned None   Lives with - Patient's Self Care Capacity Alone and Able to Care For Self  (states friend will be staying with her on D/C)   Prior Level of ADL Function   Self Feeding Independent   Grooming / Hygiene Independent   Bathing Independent   Dressing Independent   Toileting Independent   ADL Assessment   Grooming Supervision   Upper Body Dressing Supervision   Lower Body Dressing Minimal Assist   Functional Mobility   Sit to Stand Minimal Assist   Bed, Chair, Wheelchair Transfer Minimal Assist   Short Term Goals   Short Term Goal # 1 supervised with LB dressing   Short Term Goal # 2 supervised with ADL txfs

## 2021-02-01 NOTE — PROGRESS NOTES
"Critical Care Progress Note    Date of admission  1/21/2021    Chief Complaint  45 y.o. female transferred to ICU 1/25 with hypoxia after intubation for surgery.    Hospital Course  \"45 y.o. female who presented 1/21/2021 with large calcified meningioma, day 5 of [facility] and there is mass effect.  Planned surgery today but not oxygenating when on ventilator. left side white out on chest xray in OR with ET tube in right main stem.  Per nursing/Dr Soto bronchoscopy in OR with adequate placement per anesthesiology, no procedure note currently.  Repeat intubation without improvement in hypoxia.  No secretions noted.  Critical care consulted by neurosurgery.  Patient now alert and oriented and on room air sating adequately.  I have ordered a repeat chest xray.  Per bedside us per myself adequate LVEF, right ventricle not dilated and fluid status adequate.  A lines upper chest with some scattered b lines lower chest.  No pleural effusion. \"    1/26 -room air, no distress, transfer out of ICU, plan for surgery in the coming days  1/30 -ayo altered moves all extremities but not following commands, question seizure placed on cEEG and Neuro consulted, MRI today, remains on Decadron  1/31 -much more awake interactive and following commands, no seizure, discontinuing cEEG today  2/1 -    Interval Problem Update  Reviewed last 24 hour events:    A&O x4  No Sz  HA better  Aphasia improved  SR 60-70s  SBp 100-110s (Goal < 140)  UO adequate  /hr  TF goal 55  4 -> 1 lpm NC O2  IS 1250  Tm 98.4  Decadron 10 Q6H  Salt tablets 1 gram  Na 132  Keppra/Vimpat  SSI   Lantus 15  Sequentials      Keep in ICU  Drop neuro checks to Q2  NS pulling drain  Reduce Decadron to Q8H  Mobilize  Swallow eval  PT/OT  Pull Natali  Heplock IVF  Up Lantus & large SSI  Flourinef       YESTERDAY   More awake, follows; drowsey; no sz  BUE 3/5  BLE 3/5  6 lpm oxy mask  T-max 99  I/O = 2.7/1.3  -110s  SR 60-70  GCS 10  WBC " 26.6-18.6  Glucose 218  vimpat started  Sadia TF at goal  ? Start VTE ppx  Calcium low; f/u iCa  Resume lantus 15  keppra  PT/OT, mobilize    Review of Systems  Review of Systems   Unable to perform ROS: Mental status change   Constitutional: Positive for malaise/fatigue (Better). Negative for chills and fever.   HENT: Negative for sore throat.    Eyes: Negative for blurred vision and double vision.   Respiratory: Negative for cough, sputum production and shortness of breath.    Cardiovascular: Negative for chest pain and palpitations.   Gastrointestinal: Negative for abdominal pain, diarrhea, nausea and vomiting.   Genitourinary: Negative.    Musculoskeletal: Negative for back pain.   Neurological: Positive for headaches (Better). Negative for sensory change, speech change, focal weakness and seizures.   Endo/Heme/Allergies: Does not bruise/bleed easily.   Psychiatric/Behavioral: The patient is not nervous/anxious.         Vital Signs for last 24 hours   Temp:  [36.2 °C (97.1 °F)-36.9 °C (98.4 °F)] 36.2 °C (97.1 °F)  Pulse:  [58-88] 85  Resp:  [10-68] 13  BP: ()/(55-70) 114/68  SpO2:  [92 %-96 %] 93 %    Hemodynamic parameters for last 24 hours       Respiratory Information for the last 24 hours       Physical Exam   Physical Exam  Vitals signs reviewed.   Constitutional:       General: She is not in acute distress.     Appearance: She is obese. She is not ill-appearing or toxic-appearing.   HENT:      Head:      Comments: Craniotomy incision dressed, drain pulled 2/1     Nose: No rhinorrhea.      Mouth/Throat:      Mouth: Mucous membranes are moist.   Eyes:      General: No scleral icterus.     Extraocular Movements: Extraocular movements intact.      Pupils: Pupils are equal, round, and reactive to light.   Neck:      Musculoskeletal: Neck supple. No neck rigidity.   Cardiovascular:      Rate and Rhythm: Normal rate and regular rhythm.  No extrasystoles are present.     Pulses: Normal pulses.      Heart  sounds: No murmur. No gallop.       Comments: SR  Pulmonary:      Effort: No respiratory distress.      Breath sounds: No wheezing.   Abdominal:      General: There is no distension.      Palpations: There is no mass.      Tenderness: There is no abdominal tenderness. There is no right CVA tenderness, left CVA tenderness or guarding.   Musculoskeletal:         General: No swelling, tenderness or deformity.      Right lower leg: No edema.      Left lower leg: No edema.   Lymphadenopathy:      Cervical: No cervical adenopathy.   Skin:     General: Skin is warm and dry.      Capillary Refill: Capillary refill takes less than 2 seconds.      Coloration: Skin is not cyanotic.      Findings: No lesion.      Nails: There is no clubbing.     Neurological:      General: No focal deficit present.      Mental Status: She is alert and oriented to person, place, and time. Mental status is at baseline.      GCS: GCS eye subscore is 4. GCS verbal subscore is 5. GCS motor subscore is 6.      Cranial Nerves: Cranial nerves are intact.      Sensory: Sensation is intact.      Motor: Motor function is intact.      Comments: Follows well, occasionally confused about situation improved   Psychiatric:         Mood and Affect: Mood normal.         Behavior: Behavior normal.         Thought Content: Thought content normal.         Medications  Current Facility-Administered Medications   Medication Dose Route Frequency Provider Last Rate Last Admin   • dexamethasone (DECADRON) injection 10 mg  10 mg Intravenous Q8HRS Jono Qureshi, A.P.N.       • fludrocortisone (FLORINEF) tablet 0.1 mg  0.1 mg Enteral Tube BID Jono Qureshi, A.P.N.       • insulin glargine (Lantus) injection  20 Units Subcutaneous Q EVENING Rene Doll M.D.       • insulin regular (HumuLIN R,NovoLIN R) injection  3-14 Units Subcutaneous Q6HRS Rene Doll M.D.   7 Units at 02/01/21 0929    And   • glucose 4 g chewable tablet 16 g  16 g Oral Q15 MIN PRN  Rene Doll M.D.        And   • dextrose 50% (D50W) injection 50 mL  50 mL Intravenous Q15 MIN PRN Rene Doll M.D.       • lacosamide (VIMPAT) 100 mg in  mL ivpb  100 mg Intravenous Q12HRS Mario Ng M.D.   Stopped at 02/01/21 0618   • ondansetron (ZOFRAN ODT) dispertab 4 mg  4 mg Enteral Tube Q4HRS PRN Malik Sierra M.D.       • sodium chloride (SALT) tablet 1 g  1 g Enteral Tube Q8HRS Malik Sierra M.D.   1 g at 02/01/21 0517   • Pharmacy Consult: Enteral tube insertion - review meds/change route/product selection  1 Each Other PHARMACY TO DOSE Malik Sierra M.D.       • senna-docusate (PERICOLACE or SENOKOT S) 8.6-50 MG per tablet 2 Tab  2 Tab Enteral Tube BID Malik Sierra M.D.   2 Tab at 02/01/21 0517    And   • polyethylene glycol/lytes (MIRALAX) PACKET 1 Packet  1 Packet Enteral Tube QDAY PRN Malik Sierra M.D.        And   • magnesium hydroxide (MILK OF MAGNESIA) suspension 30 mL  30 mL Enteral Tube QDAY PRN Malik Sierra M.D.        And   • bisacodyl (DULCOLAX) suppository 10 mg  10 mg Rectal QDAY PRN Malik Sierra M.D.       • acetaminophen (Tylenol) tablet 650 mg  650 mg Enteral Tube Q6HRS PRN Malik Sierra M.D.   650 mg at 01/31/21 2141   • cloNIDine (CATAPRES) tablet 0.1 mg  0.1 mg Enteral Tube Q4HRS PRN Malik Sierra M.D.       • oxyCODONE immediate release (ROXICODONE) tablet 10 mg  10 mg Enteral Tube Q3HRS PRN Malik Sierra M.D.       • oxyCODONE immediate-release (ROXICODONE) tablet 5 mg  5 mg Enteral Tube Q3HRS PRN Malik Sierra M.D.   5 mg at 01/31/21 1526   • promethazine (PHENERGAN) tablet 12.5-25 mg  12.5-25 mg Enteral Tube Q4HRS PRN aMlik Sierra M.D.       • Pharmacy Consult Request ...Pain Management Review 1 Each  1 Each Other PHARMACY TO DOSE LATASHA [place]       • MD ALERT...DO NOT ADMINISTER NSAIDS or ASPIRIN unless ORDERED By Neurosurgery 1 Each  1 Each Other PRN MAGALY Glasgow.       • dexamethasone (DECADRON)  injection 4 mg  4 mg Intravenous Once PRN Jono Qureshi, A.P.N.   Stopped at 01/31/21 1430   • diphenhydrAMINE (BENADRYL) injection 25 mg  25 mg Intravenous Q6HRS PRN Jono Qureshi, A.P.N.       • scopolamine (TRANSDERM-SCOP) patch 1 Patch  1 Patch Transdermal Q72HRS PRN Jono Qureshi, A.P.N.       • labetalol (NORMODYNE/TRANDATE) injection 10 mg  10 mg Intravenous Q HOUR PRN Jono Qureshi, A.P.N.       • hydrALAZINE (APRESOLINE) injection 10 mg  10 mg Intravenous Q HOUR PRN Jono Qureshi, A.P.N.       • morphine (pf) 4 mg/mL injection 4 mg  4 mg Intravenous Q3HRS PRN Jono Gilmoreard, A.P.N.   4 mg at 01/30/21 2221   • ondansetron (ZOFRAN) syringe/vial injection 4 mg  4 mg Intravenous Q4HRS PRN Jono Gilmoreard, A.P.N.       • LORazepam (ATIVAN) injection 2 mg  2 mg Intravenous Q5 MIN PRN Clive Montemayor D.O.       • levETIRAcetam (Keppra) 1000 mg in 100 mL NaCl IV premix  1,000 mg Intravenous Q12HRS Luke Soto M.D.   Stopped at 02/01/21 0532   • promethazine (PHENERGAN) suppository 12.5-25 mg  12.5-25 mg Rectal Q4HRS PRN Win Scanlon M.D.       • prochlorperazine (COMPAZINE) injection 5-10 mg  5-10 mg Intravenous Q4HRS PRN Win Scanlon M.D.           Fluids    Intake/Output Summary (Last 24 hours) at 2/1/2021 0932  Last data filed at 2/1/2021 0618  Gross per 24 hour   Intake 4115 ml   Output 2075 ml   Net 2040 ml       Laboratory          Recent Labs     01/30/21  0442 01/31/21  0450 02/01/21  0520   SODIUM 137 133* 132*   POTASSIUM 4.1 4.7 4.5   CHLORIDE 104 103 100   CO2 27 25 24   BUN 13 13 17   CREATININE 0.47* 0.38* 0.40*   CALCIUM 7.8* 7.4* 7.9*     Recent Labs     01/30/21 0442 01/31/21 0450 02/01/21  0520   PREALBUMIN  --  26.1  --    GLUCOSE 189* 272* 365*     Recent Labs     01/30/21 0442 01/31/21 0450 02/01/21  0520   WBC 26.6* 18.6* 12.9*     Recent Labs     01/30/21 0442 01/31/21 0450 02/01/21  0520   RBC 4.33 3.72* 3.84*   HEMOGLOBIN 13.7 11.5* 12.2   HEMATOCRIT 40.7 35.4*  36.5*   PLATELETCT 325 582 002       Imaging  X-Ray:  I have personally reviewed the images and compared with prior images.  EKG:  I have personally reviewed the images and compared with prior images.  CT:    Reviewed  Echo:   Reviewed    Assessment/Plan  * Meningioma (HCC)- (present on admission)  Assessment & Plan  S/p resection with Dr. Soto 1/29  Neurochecks and VS per protocol  SBP < 150  Maintain euvolemia and normal sodium/blood sugars  Wean Decadron  Ongoing seizure treatment  Neurochecks every 2 hours      Seizure (HCC)  Assessment & Plan  GTC ~ 1 min, no recurrence  Abated with Lorazepam, redose as needed  Continue Keppra   Vimpat added  Continuous EEG discontinued  Optimize electrolytes    Hypoxia  Assessment & Plan  S/p seizure some desats, improved  Protecting airway  2 protocols  Symptom spirometry  Mobilize when clinically safe      Diabetes mellitus (HCC)  Assessment & Plan  He will poorly controlled, likely hemoglobin 9.7  Exacerbated by current steroid use, reduce steroids if okay with neurosurgery -> taper over 2 weeks  Increase Lantus in, c crease to high sliding scale every 6 or AC at bedtime    Obesity (BMI 30.0-34.9)  Assessment & Plan  Likely contributory to pulmonary risk for surgery  No history of albina diagnosis, monitoring  Possible underlying pulmonary htn, no regurgitant jet noted on echo, RVSP not estimated  ECHO normal       Update:  Neuro status improved again, no seizure, continue Keppra/Vimpat  Keep in ICU 1 more day per neurosurgery  Blood sugar running high, adjusting Lantus and sliding scale  Reviewed Decadron use with NS, will decrease to 3 times daily  Follow evaluation plan for today  Continue enteral nutrition now  PT/OT ordered      I have performed a physical exam and reviewed and updated ROS and Plan today (2/1/2021). In review of yesterday's note (1/31/2021), there are no changes except as documented above.     Case reviewed at length with RN, RT, charge RN, clinical  pharmacist, neurosurgery and patient

## 2021-02-02 LAB
ANION GAP SERPL CALC-SCNC: 8 MMOL/L (ref 7–16)
BUN SERPL-MCNC: 16 MG/DL (ref 8–22)
CALCIUM SERPL-MCNC: 8.4 MG/DL (ref 8.5–10.5)
CHLORIDE SERPL-SCNC: 98 MMOL/L (ref 96–112)
CO2 SERPL-SCNC: 26 MMOL/L (ref 20–33)
CREAT SERPL-MCNC: 0.44 MG/DL (ref 0.5–1.4)
GLUCOSE BLD-MCNC: 228 MG/DL (ref 65–99)
GLUCOSE BLD-MCNC: 283 MG/DL (ref 65–99)
GLUCOSE BLD-MCNC: 337 MG/DL (ref 65–99)
GLUCOSE BLD-MCNC: 432 MG/DL (ref 65–99)
GLUCOSE SERPL-MCNC: 218 MG/DL (ref 65–99)
POTASSIUM SERPL-SCNC: 4.3 MMOL/L (ref 3.6–5.5)
SODIUM SERPL-SCNC: 132 MMOL/L (ref 135–145)

## 2021-02-02 PROCEDURE — 700102 HCHG RX REV CODE 250 W/ 637 OVERRIDE(OP): Performed by: INTERNAL MEDICINE

## 2021-02-02 PROCEDURE — 700111 HCHG RX REV CODE 636 W/ 250 OVERRIDE (IP): Performed by: NEUROLOGICAL SURGERY

## 2021-02-02 PROCEDURE — 96372 THER/PROPH/DIAG INJ SC/IM: CPT

## 2021-02-02 PROCEDURE — A9270 NON-COVERED ITEM OR SERVICE: HCPCS | Performed by: HOSPITALIST

## 2021-02-02 PROCEDURE — 80048 BASIC METABOLIC PNL TOTAL CA: CPT

## 2021-02-02 PROCEDURE — 82962 GLUCOSE BLOOD TEST: CPT

## 2021-02-02 PROCEDURE — 99233 SBSQ HOSP IP/OBS HIGH 50: CPT | Performed by: INTERNAL MEDICINE

## 2021-02-02 PROCEDURE — 99233 SBSQ HOSP IP/OBS HIGH 50: CPT | Performed by: HOSPITALIST

## 2021-02-02 PROCEDURE — A9270 NON-COVERED ITEM OR SERVICE: HCPCS | Performed by: INTERNAL MEDICINE

## 2021-02-02 PROCEDURE — 700111 HCHG RX REV CODE 636 W/ 250 OVERRIDE (IP): Performed by: CLINICAL NURSE SPECIALIST

## 2021-02-02 PROCEDURE — A9270 NON-COVERED ITEM OR SERVICE: HCPCS | Performed by: CLINICAL NURSE SPECIALIST

## 2021-02-02 PROCEDURE — 96376 TX/PRO/DX INJ SAME DRUG ADON: CPT

## 2021-02-02 PROCEDURE — 770001 HCHG ROOM/CARE - MED/SURG/GYN PRIV*

## 2021-02-02 PROCEDURE — 700102 HCHG RX REV CODE 250 W/ 637 OVERRIDE(OP): Performed by: CLINICAL NURSE SPECIALIST

## 2021-02-02 PROCEDURE — 700102 HCHG RX REV CODE 250 W/ 637 OVERRIDE(OP): Performed by: HOSPITALIST

## 2021-02-02 RX ORDER — DEXAMETHASONE 4 MG/1
4 TABLET ORAL EVERY 12 HOURS
Status: DISCONTINUED | OUTPATIENT
Start: 2021-02-09 | End: 2021-02-04 | Stop reason: HOSPADM

## 2021-02-02 RX ORDER — LEVETIRACETAM 500 MG/1
1000 TABLET ORAL 2 TIMES DAILY
Status: DISCONTINUED | OUTPATIENT
Start: 2021-02-02 | End: 2021-02-04 | Stop reason: HOSPADM

## 2021-02-02 RX ORDER — INSULIN GLARGINE 100 [IU]/ML
25 INJECTION, SOLUTION SUBCUTANEOUS EVERY EVENING
Status: DISCONTINUED | OUTPATIENT
Start: 2021-02-02 | End: 2021-02-03

## 2021-02-02 RX ORDER — DEXAMETHASONE 1 MG
1 TABLET ORAL EVERY 12 HOURS
Status: DISCONTINUED | OUTPATIENT
Start: 2021-02-13 | End: 2021-02-04 | Stop reason: HOSPADM

## 2021-02-02 RX ORDER — DEXAMETHASONE 4 MG/1
8 TABLET ORAL EVERY 12 HOURS
Status: DISCONTINUED | OUTPATIENT
Start: 2021-02-05 | End: 2021-02-04 | Stop reason: HOSPADM

## 2021-02-02 RX ORDER — DEXAMETHASONE 1 MG
2 TABLET ORAL EVERY 12 HOURS
Status: DISCONTINUED | OUTPATIENT
Start: 2021-02-11 | End: 2021-02-04 | Stop reason: HOSPADM

## 2021-02-02 RX ORDER — DEXAMETHASONE 6 MG/1
6 TABLET ORAL EVERY 12 HOURS
Status: DISCONTINUED | OUTPATIENT
Start: 2021-02-07 | End: 2021-02-04 | Stop reason: HOSPADM

## 2021-02-02 RX ORDER — DEXAMETHASONE 4 MG/1
8 TABLET ORAL EVERY 8 HOURS
Status: DISCONTINUED | OUTPATIENT
Start: 2021-02-03 | End: 2021-02-04 | Stop reason: HOSPADM

## 2021-02-02 RX ORDER — LACOSAMIDE 100 MG/1
100 TABLET ORAL 2 TIMES DAILY
Status: DISCONTINUED | OUTPATIENT
Start: 2021-02-02 | End: 2021-02-04 | Stop reason: HOSPADM

## 2021-02-02 RX ADMIN — Medication 1 G: at 22:00

## 2021-02-02 RX ADMIN — METFORMIN HYDROCHLORIDE 500 MG: 500 TABLET ORAL at 17:50

## 2021-02-02 RX ADMIN — DEXAMETHASONE 10 MG: 4 TABLET ORAL at 13:41

## 2021-02-02 RX ADMIN — FLUDROCORTISONE ACETATE 0.1 MG: 0.1 TABLET ORAL at 22:00

## 2021-02-02 RX ADMIN — ACETAMINOPHEN 650 MG: 325 TABLET ORAL at 09:48

## 2021-02-02 RX ADMIN — METFORMIN HYDROCHLORIDE 500 MG: 500 TABLET ORAL at 13:44

## 2021-02-02 RX ADMIN — DOCUSATE SODIUM 50 MG AND SENNOSIDES 8.6 MG 2 TABLET: 8.6; 5 TABLET, FILM COATED ORAL at 17:49

## 2021-02-02 RX ADMIN — FLUDROCORTISONE ACETATE 0.1 MG: 0.1 TABLET ORAL at 08:45

## 2021-02-02 RX ADMIN — Medication 1 G: at 06:15

## 2021-02-02 RX ADMIN — LEVETIRACETAM 1000 MG: 500 TABLET ORAL at 17:49

## 2021-02-02 RX ADMIN — LEVETIRACETAM INJECTION 1000 MG: 10 INJECTION INTRAVENOUS at 06:15

## 2021-02-02 RX ADMIN — DEXAMETHASONE SODIUM PHOSPHATE 10 MG: 4 INJECTION, SOLUTION INTRA-ARTICULAR; INTRALESIONAL; INTRAMUSCULAR; INTRAVENOUS; SOFT TISSUE at 06:15

## 2021-02-02 RX ADMIN — ACETAMINOPHEN 650 MG: 325 TABLET ORAL at 20:05

## 2021-02-02 RX ADMIN — INSULIN GLARGINE 25 UNITS: 100 INJECTION, SOLUTION SUBCUTANEOUS at 18:02

## 2021-02-02 RX ADMIN — Medication 1 G: at 13:41

## 2021-02-02 RX ADMIN — DEXAMETHASONE 10 MG: 4 TABLET ORAL at 22:00

## 2021-02-02 RX ADMIN — DOCUSATE SODIUM 50 MG AND SENNOSIDES 8.6 MG 2 TABLET: 8.6; 5 TABLET, FILM COATED ORAL at 06:16

## 2021-02-02 RX ADMIN — LACOSAMIDE 100 MG: 100 TABLET, FILM COATED ORAL at 08:45

## 2021-02-02 RX ADMIN — LACOSAMIDE 100 MG: 100 TABLET, FILM COATED ORAL at 17:49

## 2021-02-02 ASSESSMENT — ENCOUNTER SYMPTOMS
SHORTNESS OF BREATH: 0
COUGH: 0
SORE THROAT: 0
BLURRED VISION: 0
HEADACHES: 1
SPUTUM PRODUCTION: 0
BRUISES/BLEEDS EASILY: 0
SEIZURES: 0
NAUSEA: 0
FOCAL WEAKNESS: 0
NERVOUS/ANXIOUS: 0
SENSORY CHANGE: 0
DIARRHEA: 0
DOUBLE VISION: 0
SPEECH CHANGE: 0
ABDOMINAL PAIN: 0
VOMITING: 0
PALPITATIONS: 0
FEVER: 0
BACK PAIN: 0
CHILLS: 0

## 2021-02-02 ASSESSMENT — PAIN DESCRIPTION - PAIN TYPE
TYPE: ACUTE PAIN

## 2021-02-02 ASSESSMENT — PATIENT HEALTH QUESTIONNAIRE - PHQ9
SUM OF ALL RESPONSES TO PHQ9 QUESTIONS 1 AND 2: 0
2. FEELING DOWN, DEPRESSED, IRRITABLE, OR HOPELESS: NOT AT ALL
1. LITTLE INTEREST OR PLEASURE IN DOING THINGS: NOT AT ALL

## 2021-02-02 NOTE — DIETARY
Nutrition services: Tube feeding discontinued and Diabetici diet started.  Pt to be seen daily by nutrition representative for meal preferences.

## 2021-02-02 NOTE — DISCHARGE PLANNING
Anticipated Discharge Disposition: TBD    Action: Chart reviewed. PMR was consulted today, pending evaluation. Therapies have worked with pt. No additional post acute referrals or needs at this time.     Barriers to Discharge: Unknown dc needs at this time    Plan: Continue to assist with social/dc needs

## 2021-02-02 NOTE — PROGRESS NOTES
Neurosurgery Progress Note    Subjective:  Doing well, no HA  No sz activity  Islas still in, eating, flatus    Exam:  Awake, ox 3, clear  PERRL, conjugate, EOMs intact  Face symm, tongue midline  Str 5/5, no drift  Inc c/d w/ staples        BP  Min: 101/56  Max: 129/78  Pulse  Av.4  Min: 55  Max: 85  Resp  Av.8  Min: 12  Max: 34  Temp  Av.4 °C (97.6 °F)  Min: 36.1 °C (97 °F)  Max: 37 °C (98.6 °F)  SpO2  Av.8 %  Min: 89 %  Max: 98 %    No data recorded    Recent Labs     21  04521  0520   WBC 18.6* 12.9*   RBC 3.72* 3.84*   HEMOGLOBIN 11.5* 12.2   HEMATOCRIT 35.4* 36.5*   MCV 95.2 95.1   MCH 30.9 31.8   MCHC 32.5* 33.4*   RDW 41.2 40.3   PLATELETCT 273 271   MPV 10.6 10.6     Recent Labs     21  04521  0520   SODIUM 133* 132*   POTASSIUM 4.7 4.5   CHLORIDE 103 100   CO2 25 24   GLUCOSE 272* 365*   BUN 13 17   CREATININE 0.38* 0.40*   CALCIUM 7.4* 7.9*               Intake/Output       21 07 - 21 0659 21 - 2159       Total  Total       Intake    P.O.  550  300 850  --  -- --    P.O. 550 300 850 -- -- --    Other  30  -- 30  --  -- --    Medications (PO/Enteral Liquids) 30 -- 30 -- -- --    NG/GT  110  -- 110  --  -- --    Intake (mL) ([REMOVED] Enteral Tube 21 Right nare) 110 -- 110 -- -- --    IV Piggyback  0  -- 0  --  -- --    Volume (mL) (lacosamide (VIMPAT) 100 mg in  mL ivpb) 0 -- 0 -- -- --    Total Intake 690 300 990 -- -- --       Output    Urine  2100  1250 3350  --  -- --    Output (mL) (Urethral Catheter Temperature probe 16 Fr.) 2100 1250 3350 -- -- --    Stool  --  -- --  --  -- --    Number of Times Stooled 0 x -- 0 x -- -- --    Total Output 2100 1250 3350 -- -- --       Net I/O     -1410 -950 -2360 -- -- --            Intake/Output Summary (Last 24 hours) at 2021 0752  Last data filed at 2021 0600  Gross per 24 hour   Intake 990 ml   Output 3350 ml   Net -2360 ml             • dexamethasone  10 mg Q8HRS   • fludrocortisone  0.1 mg BID   • insulin glargine  20 Units Q EVENING   • insulin regular  3-14 Units 4X/DAY ACHS    And   • glucose  16 g Q15 MIN PRN    And   • dextrose 50%  50 mL Q15 MIN PRN   • lacosamide (VIMPAT) ivpb  100 mg Q12HRS   • ondansetron  4 mg Q4HRS PRN   • sodium chloride  1 g Q8HRS   • Pharmacy  1 Each PHARMACY TO DOSE   • senna-docusate  2 Tab BID    And   • polyethylene glycol/lytes  1 Packet QDAY PRN    And   • magnesium hydroxide  30 mL QDAY PRN    And   • bisacodyl  10 mg QDAY PRN   • acetaminophen  650 mg Q6HRS PRN   • cloNIDine  0.1 mg Q4HRS PRN   • oxyCODONE immediate release  10 mg Q3HRS PRN   • oxyCODONE immediate-release  5 mg Q3HRS PRN   • promethazine  12.5-25 mg Q4HRS PRN   • Pharmacy Consult Request  1 Each PHARMACY TO DOSE   • MD ALERT...DO NOT ADMINISTER NSAIDS or ASPIRIN unless ORDERED By Neurosurgery  1 Each PRN   • dexamethasone  4 mg Once PRN   • diphenhydrAMINE  25 mg Q6HRS PRN   • scopolamine  1 Patch Q72HRS PRN   • labetalol  10 mg Q HOUR PRN   • hydrALAZINE  10 mg Q HOUR PRN   • morphine injection  4 mg Q3HRS PRN   • ondansetron  4 mg Q4HRS PRN   • LORazepam  2 mg Q5 MIN PRN   • levETIRAcetam (Keppra) IV  1,000 mg Q12HRS   • promethazine  12.5-25 mg Q4HRS PRN   • prochlorperazine  5-10 mg Q4HRS PRN       Assessment and Plan:  Hospital day #11 left parietal meningioma  POD #4, left craniotomy  Prophylactic anticoagulation: hold         Start date/time: pt ambulatory    Neuro as above- sig improvement  Appreciate neurology help with management; no sz on eeg  MRI 1/30 shows no stroke, no venus congestion or edema next to resection cavity   keppra 1000 BID, vimpat- per neurology   Dec to 10Q8 and wean off slowly over 2 weeks  Neuro checks Q4  Hyperglycemia- improved  - on lantus, regular insulin  hyponatremia- on salt tabs,  florinef-- labs pending  Bowel protocol  Reg diet  Pt/ot/mobilize  Transfer to floor

## 2021-02-02 NOTE — CARE PLAN
Problem: Communication  Goal: The ability to communicate needs accurately and effectively will improve  Outcome: PROGRESSING AS EXPECTED     Problem: Safety  Goal: Will remain free from injury  Outcome: PROGRESSING AS EXPECTED  Goal: Will remain free from falls  Outcome: PROGRESSING AS EXPECTED     Problem: Infection  Goal: Will remain free from infection  Outcome: PROGRESSING AS EXPECTED     Problem: Venous Thromboembolism (VTW)/Deep Vein Thrombosis (DVT) Prevention:  Goal: Patient will participate in Venous Thrombosis (VTE)/Deep Vein Thrombosis (DVT)Prevention Measures  Outcome: PROGRESSING AS EXPECTED     Problem: Bowel/Gastric:  Goal: Normal bowel function is maintained or improved  Outcome: PROGRESSING AS EXPECTED  Goal: Will not experience complications related to bowel motility  Outcome: PROGRESSING AS EXPECTED     Problem: Knowledge Deficit  Goal: Knowledge of disease process/condition, treatment plan, diagnostic tests, and medications will improve  Outcome: PROGRESSING AS EXPECTED  Goal: Knowledge of the prescribed therapeutic regimen will improve  Outcome: PROGRESSING AS EXPECTED     Problem: Discharge Barriers/Planning  Goal: Patient's continuum of care needs will be met  Outcome: PROGRESSING AS EXPECTED     Problem: Pain Management  Goal: Pain level will decrease to patient's comfort goal  Outcome: PROGRESSING AS EXPECTED     Problem: Psychosocial Needs:  Goal: Level of anxiety will decrease  Outcome: PROGRESSING AS EXPECTED     Problem: Respiratory:  Goal: Respiratory status will improve  Outcome: PROGRESSING AS EXPECTED     Problem: Craniotomy surgery  Goal: Post op care of the craniotomy patient  Outcome: PROGRESSING AS EXPECTED     Problem: Neuro Status  Goal: Monitor neuro status and rapid identification of neuro changes  Outcome: PROGRESSING AS EXPECTED     Problem: Mobility  Goal: Risk for activity intolerance will decrease  Outcome: PROGRESSING AS EXPECTED     Problem: Skin Integrity  Goal:  Risk for impaired skin integrity will decrease  Outcome: PROGRESSING AS EXPECTED

## 2021-02-02 NOTE — PROGRESS NOTES
Hospital Medicine Daily Progress Note    Date of Service  2/2/2021    Chief Complaint  45 y.o. female admitted 1/21/2021 with headache    Hospital Course  45-year-old female presented with headache noted to have meningioma on CT was evaluated by neurosurgery and underwent craniotomy for resection on 1/29/2021.  She had hypoxia postoperatively and confusion with concern for subclinical seizures.  She was evaluated by neurology and critical care.  She has been maintained on Keppra and Vimpat and Decadron.  She had hypoglycemia and has been started on steroids.      Interval Problem Update    Patient currently on room air  Tolerating diet  Complains of mild headache  She is afebrile  Blood pressure stable    Consultants/Specialty  Neurology  Neurosurgery  Critical care    Code Status  Full Code    Disposition  To be determined    Review of Systems  Review of Systems   Constitutional: Negative for chills and fever.   Respiratory: Negative for cough and shortness of breath.    Cardiovascular: Negative for chest pain.   Neurological: Positive for headaches.   All other systems reviewed and are negative.       Physical Exam  Temp:  [36.1 °C (97 °F)-37 °C (98.6 °F)] 37 °C (98.6 °F)  Pulse:  [67-85] 80  Resp:  [11-34] 11  BP: (101-125)/(56-72) 125/68  SpO2:  [89 %-96 %] 93 %    Physical Exam  Vitals signs and nursing note reviewed.   Constitutional:       General: She is not in acute distress.  HENT:      Head: Normocephalic.      Comments: Surgical wound with staples in place no drainage     Nose: Nose normal. No rhinorrhea.      Mouth/Throat:      Pharynx: No oropharyngeal exudate or posterior oropharyngeal erythema.   Eyes:      General: No scleral icterus.        Right eye: No discharge.         Left eye: No discharge.   Neck:      Musculoskeletal: Neck supple. No neck rigidity.   Cardiovascular:      Rate and Rhythm: Normal rate and regular rhythm.      Heart sounds: Normal heart sounds. No murmur. No friction rub. No  gallop.    Pulmonary:      Effort: Pulmonary effort is normal. No respiratory distress.      Breath sounds: Normal breath sounds. No stridor. No wheezing, rhonchi or rales.   Chest:      Chest wall: No tenderness.   Abdominal:      General: Bowel sounds are normal. There is no distension.      Palpations: Abdomen is soft. There is no mass.      Tenderness: There is no abdominal tenderness. There is no rebound.   Musculoskeletal:         General: No swelling or tenderness.   Skin:     General: Skin is warm and dry.      Coloration: Skin is not cyanotic or jaundiced.      Nails: There is no clubbing.     Neurological:      General: No focal deficit present.      Mental Status: She is alert and oriented to person, place, and time.      Cranial Nerves: No cranial nerve deficit.      Motor: No weakness.   Psychiatric:         Mood and Affect: Mood normal.         Behavior: Behavior normal.         Fluids    Intake/Output Summary (Last 24 hours) at 2/2/2021 1035  Last data filed at 2/2/2021 0900  Gross per 24 hour   Intake 850 ml   Output 2825 ml   Net -1975 ml       Laboratory  Recent Labs     01/31/21  0450 02/01/21  0520   WBC 18.6* 12.9*   RBC 3.72* 3.84*   HEMOGLOBIN 11.5* 12.2   HEMATOCRIT 35.4* 36.5*   MCV 95.2 95.1   MCH 30.9 31.8   MCHC 32.5* 33.4*   RDW 41.2 40.3   PLATELETCT 273 271   MPV 10.6 10.6     Recent Labs     01/31/21  0450 02/01/21  0520 02/02/21  0423   SODIUM 133* 132* 132*   POTASSIUM 4.7 4.5 4.3   CHLORIDE 103 100 98   CO2 25 24 26   GLUCOSE 272* 365* 218*   BUN 13 17 16   CREATININE 0.38* 0.40* 0.44*   CALCIUM 7.4* 7.9* 8.4*                   Imaging  DX-ABDOMEN FOR TUBE PLACEMENT   Final Result         1.  Nonspecific bowel gas pattern.   2.  Dobbhoff tube tip terminates overlying the expected location of the gastric antrum   3.  Bibasilar atelectasis or infiltrate.      MR-BRAIN-WITH & W/O   Final Result      1.  Status post resection of LEFT parietal extra-axial mass   2.  Small amount of  blood in the surgical resection bed without significant mass effect   3.  Changes in the high LEFT parietal lobe adjacent to the surgical resection bed could be related to ischemia or recent seizure activity   4.  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 malformations as previously noted      CT-HEAD W/O   Final Result      1.  Status post left parietal craniotomy/cranioplasty.   2.  Resection of left frontoparietal parafalcine meningioma.   3.  Pneumocephalus.   4.  Mixed blood products present in the left frontoparietal cavity. No significant mass effect or midline shift.      MR-VENOGRAM (MRV) HEAD   Final Result      Occlusion of the vertex mid superior sagittal sinus secondary to previously detailed parafalcine meningioma.      DX-CHEST-PORTABLE (1 VIEW)   Final Result         1.  Suspect subtle infiltrate at the left lung base.   2.  Cardiomegaly      EC-ECHOCARDIOGRAM COMPLETE W/O CONT   Final Result      DX-CHEST-LIMITED (1 VIEW)   Final Result      1.  Interval extubation.      2.  Marked interval improvement in left lung aeration with only mild residual left basilar atelectasis.      3.  Right perihilar atelectasis.      DX-CHEST-LIMITED (1 VIEW)   Final Result      1.  Endotracheal tube in right mainstem bronchus.      2.  Complete atelectatic collapse of the left lung. Recommend pulling back endotracheal tube into the trachea.      3. Slightly aberrant course of esophageal thermometer which overlies the left hemithorax. Recommend repositioning.      1.  An Emergent Document Only message has been documented for NURIS CROFT in the [employer]  Critical Result system on 1/25/2021 10:26 AM, Message ID [id number].      MR-BRAIN-WITH & W/O   Final Result      1.  43 x 42 x 44 mm left parietal parafalcine vertex meningioma with involvement of the superior sagittal sinus and the parietal calvarium. There is prominent mass effect on the left parietal lobe with mild  vasogenic edema.   2.  7 mm intra-axial lesion in the right frontoparietal region with blooming artifact on GRE and a couple additional tiny foci of blooming artifact in the right temporal lobe and left parietotemporal region. These probably represent small cavernous    malformations however follow-up is suggested or recommend comparison with old outside prior brain MRI studies.   3.  No acute infarct or intracranial hemorrhage.      DX-CHEST-PORTABLE (1 VIEW)   Final Result         1.  Basilar atelectasis versus early infiltrate      CT-HEAD W/O   Final Result         1.  Large calcified extra-axial mass at the left apex, appearance favors meningioma, there is significant mass effect on the left parietal lobe with surrounding mild vasogenic edema. Recommend follow-up characterization with MRI of the brain without and    with contrast.      These findings were discussed with the patient's clinician, Chang Haq, on 1/21/2021 10:24 PM.           Assessment/Plan  * Meningioma (HCC)- (present on admission)  Assessment & Plan  Status post craniotomy and resection on 1/29/2021  On Decadron taper per neurosurgery  Continue Keppra and Vimpat previously evaluated by neurology      Seizure (HCC)  Assessment & Plan  Stable on Keppra and Vimpat  Previously evaluated by neurology    Hypoxia  Assessment & Plan  Resolved    Diabetes mellitus (HCC)  Assessment & Plan  Newly diagnosed with hyperglycemia    Increase Lantus continue sliding-scale insulin monitor CBGs  Start Metformin  Diabetic teaching    Hyponatremia  Assessment & Plan  Stable continue Florinef and salt tablets and monitor    Obesity (BMI 30.0-34.9)  Assessment & Plan  Body mass index is 34.69 kg/m².          Leukocytosis  Assessment & Plan  Likely reactive secondary to steroids    Elevated blood sugar  Assessment & Plan  HbA1c 9.7  Likely undiagnosed diabetes exacerbated by steroid use  Continue Lantus will increase dose to 25 units and continue high sliding  scale insulin  Will start on Metformin  Diabetic teaching  Recent Labs     01/31/21  1116 01/31/21  1653 01/31/21  2128 02/01/21  0658 02/01/21  0922 02/01/21  1248 02/01/21  1815 02/01/21  2024 02/01/21  2313 02/02/21  0614   POCGLUCOSE 276* 208* 221* 320* 284* 396* 350* 408* 292* 228*           VTE prophylaxis: SCD

## 2021-02-02 NOTE — CARE PLAN
Problem: Communication  Goal: The ability to communicate needs accurately and effectively will improve  Outcome: PROGRESSING AS EXPECTED     Problem: Safety  Goal: Will remain free from injury  Outcome: PROGRESSING AS EXPECTED  Goal: Will remain free from falls  Outcome: PROGRESSING AS EXPECTED     Problem: Infection  Goal: Will remain free from infection  Outcome: PROGRESSING AS EXPECTED     Problem: Venous Thromboembolism (VTW)/Deep Vein Thrombosis (DVT) Prevention:  Goal: Patient will participate in Venous Thrombosis (VTE)/Deep Vein Thrombosis (DVT)Prevention Measures  Outcome: PROGRESSING AS EXPECTED     Problem: Knowledge Deficit  Goal: Knowledge of disease process/condition, treatment plan, diagnostic tests, and medications will improve  Outcome: PROGRESSING AS EXPECTED  Goal: Knowledge of the prescribed therapeutic regimen will improve  Outcome: PROGRESSING AS EXPECTED     Problem: Discharge Barriers/Planning  Goal: Patient's continuum of care needs will be met  Outcome: PROGRESSING AS EXPECTED     Problem: Pain Management  Goal: Pain level will decrease to patient's comfort goal  Outcome: PROGRESSING AS EXPECTED     Problem: Psychosocial Needs:  Goal: Level of anxiety will decrease  Outcome: PROGRESSING AS EXPECTED     Problem: Respiratory:  Goal: Respiratory status will improve  Outcome: PROGRESSING AS EXPECTED     Problem: Craniotomy surgery  Goal: Post op care of the craniotomy patient  Outcome: PROGRESSING AS EXPECTED     Problem: Neuro Status  Goal: Monitor neuro status and rapid identification of neuro changes  Outcome: PROGRESSING AS EXPECTED     Problem: Mobility  Goal: Risk for activity intolerance will decrease  Outcome: PROGRESSING AS EXPECTED     Problem: Skin Integrity  Goal: Risk for impaired skin integrity will decrease  Outcome: PROGRESSING AS EXPECTED

## 2021-02-02 NOTE — PROGRESS NOTES
"Critical Care Progress Note    Date of admission  1/21/2021    Chief Complaint  45 y.o. female transferred to ICU 1/25 with hypoxia after intubation for surgery.    Hospital Course  \"45 y.o. female who presented 1/21/2021 with large calcified meningioma, day 5 of [facility] and there is mass effect.  Planned surgery today but not oxygenating when on ventilator. left side white out on chest xray in OR with ET tube in right main stem.  Per nursing/Dr Soto bronchoscopy in OR with adequate placement per anesthesiology, no procedure note currently.  Repeat intubation without improvement in hypoxia.  No secretions noted.  Critical care consulted by neurosurgery.  Patient now alert and oriented and on room air sating adequately.  I have ordered a repeat chest xray.  Per bedside us per myself adequate LVEF, right ventricle not dilated and fluid status adequate.  A lines upper chest with some scattered b lines lower chest.  No pleural effusion. \"    1/26 -room air, no distress, transfer out of ICU, plan for surgery in the coming days  1/30 -ayo altered moves all extremities but not following commands, question seizure placed on cEEG and Neuro consulted, MRI today, remains on Decadron  1/31 -much more awake interactive and following commands, no seizure, discontinuing cEEG today  2/1 - LOC better, room air, no Sz, BS high, reduce decadron  2/2 - LOC better again, no Sz, BS better, Decadron    Interval Problem Update  Reviewed last 24 hour events:    POD#4  Drains pulled  A&O x4  SR 70s  SBp 110s  UO adequate  Tm 98.1  2 lpm NC  IS 1250  Passed Swallow eval, advance diet   Keppra  Vimpat  Decadron 10 tid  SSI  Na 132  Salt tabs 1 g 3 times daily      Wean Decadron  Mobilize  Therapies  Okay to neurosurgical floor  Transition case to medical team per request from neurosurgery       YESTERDAY  A&O x4  No Sz  HA better  Aphasia improved  SR 60-70s  SBp 100-110s (Goal < 140)  UO adequate  /hr  TF goal 55  4 -> 1 lpm " NC O2  IS 1250  Tm 98.4  Decadron 10 Q6H  Salt tablets 1 gram  Na 132  Keppra/Vimpat  SSI   Lantus 15  Sequentials      Keep in ICU  Drop neuro checks to Q2  NS pulling drain  Reduce Decadron to Q8H  Mobilize  Swallow eval  PT/OT  Pull Natali  Heplock IVF  Up Lantus & large SSI  Flourinef    Review of Systems  Review of Systems   Unable to perform ROS: Mental status change   Constitutional: Positive for malaise/fatigue (resolving). Negative for chills and fever.   HENT: Negative for congestion and sore throat.    Eyes: Negative for blurred vision and double vision.   Respiratory: Negative for cough, sputum production and shortness of breath.    Cardiovascular: Negative for chest pain and palpitations.   Gastrointestinal: Negative for abdominal pain, diarrhea, nausea and vomiting.   Genitourinary: Negative.    Musculoskeletal: Negative for back pain.   Neurological: Positive for headaches (Better again). Negative for sensory change, speech change, focal weakness and seizures.   Endo/Heme/Allergies: Does not bruise/bleed easily.   Psychiatric/Behavioral: The patient is not nervous/anxious.         Vital Signs for last 24 hours   Temp:  [36.1 °C (97 °F)-37 °C (98.6 °F)] 37 °C (98.6 °F)  Pulse:  [67-84] 80  Resp:  [11-34] 11  BP: (101-125)/(56-72) 125/68  SpO2:  [89 %-96 %] 93 %    Hemodynamic parameters for last 24 hours       Respiratory Information for the last 24 hours       Physical Exam   Physical Exam  Vitals signs reviewed.   Constitutional:       General: She is not in acute distress.     Appearance: She is obese. She is not ill-appearing or toxic-appearing.   HENT:      Head: Normocephalic.      Comments: Craniotomy incision dressed, drain pulled 2/1     Nose: No rhinorrhea.      Mouth/Throat:      Mouth: Mucous membranes are moist.   Eyes:      General: No scleral icterus.     Extraocular Movements: Extraocular movements intact.      Pupils: Pupils are equal, round, and reactive to light.   Neck:       Musculoskeletal: Neck supple. No neck rigidity.   Cardiovascular:      Rate and Rhythm: Normal rate and regular rhythm.  No extrasystoles are present.     Pulses: Normal pulses.      Heart sounds: No murmur. No gallop.       Comments: SR  Pulmonary:      Effort: No respiratory distress.      Breath sounds: No wheezing, rhonchi or rales.   Abdominal:      General: There is no distension.      Palpations: There is no mass.      Tenderness: There is no abdominal tenderness. There is no right CVA tenderness, left CVA tenderness or guarding.   Musculoskeletal:         General: No swelling, tenderness or deformity.      Right lower leg: No edema.      Left lower leg: No edema.   Lymphadenopathy:      Cervical: No cervical adenopathy.   Skin:     General: Skin is warm and dry.      Capillary Refill: Capillary refill takes less than 2 seconds.      Coloration: Skin is not cyanotic.      Findings: No lesion.      Nails: There is no clubbing.     Neurological:      General: No focal deficit present.      Mental Status: She is alert and oriented to person, place, and time. Mental status is at baseline.      GCS: GCS eye subscore is 4. GCS verbal subscore is 5. GCS motor subscore is 6.      Cranial Nerves: Cranial nerves are intact.      Sensory: Sensation is intact.      Motor: Motor function is intact.      Comments: Follows well, occasionally confused about situation improved   Psychiatric:         Mood and Affect: Mood normal.         Behavior: Behavior normal.         Thought Content: Thought content normal.         Medications  Current Facility-Administered Medications   Medication Dose Route Frequency Provider Last Rate Last Admin   • dexamethasone (DECADRON) tablet 10 mg  10 mg Oral Q8HRS Jono Qureshi, A.P.N.        Followed by   • [START ON 2/3/2021] dexamethasone (DECADRON) tablet 8 mg  8 mg Oral Q8HRS Jono Qureshi, A.P.N.        Followed by   • [START ON 2/5/2021] dexamethasone (DECADRON) tablet 8 mg  8 mg  Oral Q12HRS Jono Qureshi, A.P.N.        Followed by   • [START ON 2/7/2021] dexamethasone (DECADRON) tablet 6 mg  6 mg Oral Q12HRS Jono Qureshi, A.P.N.        Followed by   • [START ON 2/9/2021] dexamethasone (DECADRON) tablet 4 mg  4 mg Oral Q12HRS Jono Qureshi, A.P.N.        Followed by   • [START ON 2/11/2021] dexamethasone (DECADRON) tablet 2 mg  2 mg Oral Q12HRS Jono Qureshi, A.P.N.        Followed by   • [START ON 2/13/2021] dexamethasone (DECADRON) tablet 1 mg  1 mg Oral Q12HRS Jono Qureshi, A.P.N.       • lacosamide (VIMPAT) tablet 100 mg  100 mg Oral BID Jono Qureshi, A.P.N.   100 mg at 02/02/21 0845   • levETIRAcetam (KEPPRA) tablet 1,000 mg  1,000 mg Oral BID Jono Qureshi, A.P.N.       • insulin glargine (Lantus) injection  25 Units Subcutaneous Q EVENING Micha Maddox M.D.       • metFORMIN (GLUCOPHAGE) tablet 500 mg  500 mg Oral BID WITH MEALS Micha Maddox M.D.       • fludrocortisone (FLORINEF) tablet 0.1 mg  0.1 mg Enteral Tube BID Jono Qureshi, A.P.N.   0.1 mg at 02/02/21 0845   • insulin regular (HumuLIN R,NovoLIN R) injection  3-14 Units Subcutaneous 4X/DAY KYAW Sierra M.D.   4 Units at 02/02/21 0616    And   • glucose 4 g chewable tablet 16 g  16 g Oral Q15 MIN PRN Malik Sierra M.D.        And   • dextrose 50% (D50W) injection 50 mL  50 mL Intravenous Q15 MIN PRN Malik Sierra M.D.       • ondansetron (ZOFRAN ODT) dispertab 4 mg  4 mg Enteral Tube Q4HRS PRN Malik Sierra M.D.       • sodium chloride (SALT) tablet 1 g  1 g Enteral Tube Q8HRS Malik Sierra M.D.   1 g at 02/02/21 0615   • senna-docusate (PERICOLACE or SENOKOT S) 8.6-50 MG per tablet 2 Tab  2 Tab Enteral Tube BID Malik Sierra M.D.   2 Tab at 02/02/21 0616    And   • polyethylene glycol/lytes (MIRALAX) PACKET 1 Packet  1 Packet Enteral Tube QDAY PRN Malik Sierra M.D.   1 Packet at 02/01/21 1810    And   • magnesium hydroxide (MILK OF MAGNESIA) suspension 30 mL  30  mL Enteral Tube QDAY PRN Malik Sierra M.D.        And   • bisacodyl (DULCOLAX) suppository 10 mg  10 mg Rectal QDAY PRN Malik Sierra M.D.       • acetaminophen (Tylenol) tablet 650 mg  650 mg Enteral Tube Q6HRS PRN Malik Sierra M.D.   650 mg at 02/02/21 0948   • cloNIDine (CATAPRES) tablet 0.1 mg  0.1 mg Enteral Tube Q4HRS PRN Malik Sierra M.D.       • oxyCODONE immediate release (ROXICODONE) tablet 10 mg  10 mg Enteral Tube Q3HRS PRN Malik Sierra M.D.       • oxyCODONE immediate-release (ROXICODONE) tablet 5 mg  5 mg Enteral Tube Q3HRS PRN Malik Sierra M.D.   5 mg at 01/31/21 1526   • promethazine (PHENERGAN) tablet 12.5-25 mg  12.5-25 mg Enteral Tube Q4HRS PRN Malik Sierra M.D.       • Pharmacy Consult Request ...Pain Management Review 1 Each  1 Each Other PHARMACY TO DOSE Jonojanes Qureshi, A.P.N.       • MD ALERT...DO NOT ADMINISTER NSAIDS or ASPIRIN unless ORDERED By Neurosurgery 1 Each  1 Each Other PRN Jono Qureshi, A.P.N.       • dexamethasone (DECADRON) injection 4 mg  4 mg Intravenous Once PRN Jono Qureshi, A.P.N.   Stopped at 01/31/21 1430   • diphenhydrAMINE (BENADRYL) injection 25 mg  25 mg Intravenous Q6HRS PRN Jono Qureshi, A.P.N.       • scopolamine (TRANSDERM-SCOP) patch 1 Patch  1 Patch Transdermal Q72HRS PRN Jono Qureshi, A.P.N.       • labetalol (NORMODYNE/TRANDATE) injection 10 mg  10 mg Intravenous Q HOUR PRN Jono Qureshi, A.P.N.       • hydrALAZINE (APRESOLINE) injection 10 mg  10 mg Intravenous Q HOUR PRN Jono Qureshi, A.P.N.       • morphine (pf) 4 mg/mL injection 4 mg  4 mg Intravenous Q3HRS PRN Jono Qureshi, A.P.N.   4 mg at 01/30/21 2221   • ondansetron (ZOFRAN) syringe/vial injection 4 mg  4 mg Intravenous Q4HRS PRN FALGUNI GlasgowN.       • LORazepam (ATIVAN) injection 2 mg  2 mg Intravenous Q5 MIN PRN Clive Montemayor D.O.       • promethazine (PHENERGAN) suppository 12.5-25 mg  12.5-25 mg Rectal Q4HRS PRN Win Scanlon M.D.       •  prochlorperazine (COMPAZINE) injection 5-10 mg  5-10 mg Intravenous Q4HRS PRN Win Scanlon M.D.           Fluids    Intake/Output Summary (Last 24 hours) at 2/2/2021 1210  Last data filed at 2/2/2021 0900  Gross per 24 hour   Intake 650 ml   Output 2475 ml   Net -1825 ml       Laboratory          Recent Labs     01/31/21 0450 02/01/21 0520 02/02/21  0423   SODIUM 133* 132* 132*   POTASSIUM 4.7 4.5 4.3   CHLORIDE 103 100 98   CO2 25 24 26   BUN 13 17 16   CREATININE 0.38* 0.40* 0.44*   CALCIUM 7.4* 7.9* 8.4*     Recent Labs     01/31/21 0450 02/01/21 0520 02/02/21  0423   PREALBUMIN 26.1  --   --    GLUCOSE 272* 365* 218*     Recent Labs     01/31/21 0450 02/01/21  0520   WBC 18.6* 12.9*     Recent Labs     01/31/21 0450 02/01/21  0520   RBC 3.72* 3.84*   HEMOGLOBIN 11.5* 12.2   HEMATOCRIT 35.4* 36.5*   PLATELETCT 273 271       Imaging  X-Ray:  I have personally reviewed the images and compared with prior images.  EKG:  I have personally reviewed the images and compared with prior images.  CT:    Reviewed  Echo:   Reviewed    Assessment/Plan  * Meningioma (HCC)- (present on admission)  Assessment & Plan  S/p resection with Dr. Soto 1/29  Neurochecks and VS per protocol  SBP < 150  Maintain euvolemia and normal sodium/blood sugars  Wean Decadron over 2 weeks per NS  Ongoing seizure treatment  Reduce neurochecks to every 4 hours      Seizure (HCC)  Assessment & Plan  GTC ~ 1 min, no recurrence  Abated with Lorazepam, redose as needed  Continue Keppra   Vimpat added  Continuous EEG discontinued  Optimize electrolytes  Seizure precautions    Hypoxia  Assessment & Plan  S/p seizure some desats, improved  Protecting airway  2 protocols  Symptom spirometry  Mobilize when clinically safe      Diabetes mellitus (HCC)  Assessment & Plan  Improved glucose control  He will poorly controlled, likely hemoglobin 9.7  Exacerbated by current steroid use, reduce steroids if okay with neurosurgery -> taper over 2 weeks  Dose  Lantus daily as needed  Continue high sliding scale  Add Metformin back and advancing diet    Obesity (BMI 30.0-34.9)  Assessment & Plan  Likely contributory to pulmonary risk for surgery  No history of albina diagnosis, monitoring  Possible underlying pulmonary htn, no regurgitant jet noted on echo, RVSP not estimated  ECHO normal otherwise      Hyponatremia  Assessment & Plan  Mild  Avoid hypotonic fluids  Continue salt tabs  Continue serial BMP     Update:  Neuro status improved again, no seizure, continue Keppra/Vimpat  Okay to transfer medically as well as from a neurosurgical perspective  Blood sugar improved, adjust Lantus continue sliding scale and add Metformin  Decadron taper over 2 weeks per neurosurgery  Continue therapies, PT/OT-eval for need for rehab over next few days  Advance diet  Encourage I-S, wean O2 therapy as able    I have performed a physical exam and reviewed and updated ROS and Plan today (2/2/2021). In review of yesterday's note (2/1/2021), there are no changes except as documented above.     Case reviewed at length with RN, RT, charge RN, clinical pharmacist, neurosurgery and patient  Case reviewed with hospitalist team, patient will be transition to their service as she transfers out of ICU.

## 2021-02-03 ENCOUNTER — HOSPITAL ENCOUNTER (INPATIENT)
Facility: REHABILITATION | Age: 46
End: 2021-02-03
Attending: PHYSICAL MEDICINE & REHABILITATION | Admitting: PHYSICAL MEDICINE & REHABILITATION
Payer: COMMERCIAL

## 2021-02-03 PROBLEM — R09.02 HYPOXIA: Status: RESOLVED | Noted: 2021-01-25 | Resolved: 2021-02-03

## 2021-02-03 LAB
ANION GAP SERPL CALC-SCNC: 12 MMOL/L (ref 7–16)
BUN SERPL-MCNC: 17 MG/DL (ref 8–22)
CALCIUM SERPL-MCNC: 8.9 MG/DL (ref 8.5–10.5)
CHLORIDE SERPL-SCNC: 99 MMOL/L (ref 96–112)
CO2 SERPL-SCNC: 23 MMOL/L (ref 20–33)
CREAT SERPL-MCNC: 0.54 MG/DL (ref 0.5–1.4)
GLUCOSE BLD-MCNC: 181 MG/DL (ref 65–99)
GLUCOSE BLD-MCNC: 182 MG/DL (ref 65–99)
GLUCOSE BLD-MCNC: 189 MG/DL (ref 65–99)
GLUCOSE BLD-MCNC: 190 MG/DL (ref 65–99)
GLUCOSE BLD-MCNC: 314 MG/DL (ref 65–99)
GLUCOSE SERPL-MCNC: 179 MG/DL (ref 65–99)
POTASSIUM SERPL-SCNC: 3.9 MMOL/L (ref 3.6–5.5)
SODIUM SERPL-SCNC: 134 MMOL/L (ref 135–145)

## 2021-02-03 PROCEDURE — 700102 HCHG RX REV CODE 250 W/ 637 OVERRIDE(OP): Performed by: CLINICAL NURSE SPECIALIST

## 2021-02-03 PROCEDURE — 82962 GLUCOSE BLOOD TEST: CPT

## 2021-02-03 PROCEDURE — A9270 NON-COVERED ITEM OR SERVICE: HCPCS | Performed by: HOSPITALIST

## 2021-02-03 PROCEDURE — 770001 HCHG ROOM/CARE - MED/SURG/GYN PRIV*

## 2021-02-03 PROCEDURE — 99223 1ST HOSP IP/OBS HIGH 75: CPT | Mod: 25 | Performed by: PHYSICAL MEDICINE & REHABILITATION

## 2021-02-03 PROCEDURE — RXMED WILLOW AMBULATORY MEDICATION CHARGE: Performed by: HOSPITALIST

## 2021-02-03 PROCEDURE — 700102 HCHG RX REV CODE 250 W/ 637 OVERRIDE(OP): Performed by: HOSPITALIST

## 2021-02-03 PROCEDURE — 700102 HCHG RX REV CODE 250 W/ 637 OVERRIDE(OP): Performed by: INTERNAL MEDICINE

## 2021-02-03 PROCEDURE — 700102 HCHG RX REV CODE 250 W/ 637 OVERRIDE(OP): Performed by: STUDENT IN AN ORGANIZED HEALTH CARE EDUCATION/TRAINING PROGRAM

## 2021-02-03 PROCEDURE — A9270 NON-COVERED ITEM OR SERVICE: HCPCS | Performed by: INTERNAL MEDICINE

## 2021-02-03 PROCEDURE — 99406 BEHAV CHNG SMOKING 3-10 MIN: CPT | Performed by: PHYSICAL MEDICINE & REHABILITATION

## 2021-02-03 PROCEDURE — A9270 NON-COVERED ITEM OR SERVICE: HCPCS | Performed by: CLINICAL NURSE SPECIALIST

## 2021-02-03 PROCEDURE — 99232 SBSQ HOSP IP/OBS MODERATE 35: CPT | Performed by: HOSPITALIST

## 2021-02-03 PROCEDURE — 80048 BASIC METABOLIC PNL TOTAL CA: CPT

## 2021-02-03 PROCEDURE — A9270 NON-COVERED ITEM OR SERVICE: HCPCS | Performed by: STUDENT IN AN ORGANIZED HEALTH CARE EDUCATION/TRAINING PROGRAM

## 2021-02-03 RX ORDER — DIPHENHYDRAMINE HYDROCHLORIDE 25 MG/1
CAPSULE, LIQUID FILLED ORAL
Qty: 1 KIT | Refills: 0 | Status: SHIPPED | OUTPATIENT
Start: 2021-02-03 | End: 2022-11-08 | Stop reason: SDUPTHER

## 2021-02-03 RX ORDER — INSULIN LISPRO 100 [IU]/ML
5 INJECTION, SOLUTION INTRAVENOUS; SUBCUTANEOUS
Qty: 9 ML | Refills: 0 | Status: SHIPPED | OUTPATIENT
Start: 2021-02-03 | End: 2021-03-01 | Stop reason: SDUPTHER

## 2021-02-03 RX ORDER — INSULIN GLARGINE 100 [IU]/ML
30 INJECTION, SOLUTION SUBCUTANEOUS EVERY EVENING
Qty: 9 ML | Refills: 0 | Status: SHIPPED | OUTPATIENT
Start: 2021-02-03 | End: 2021-03-01

## 2021-02-03 RX ORDER — INSULIN GLARGINE 100 [IU]/ML
30 INJECTION, SOLUTION SUBCUTANEOUS EVERY EVENING
Status: DISCONTINUED | OUTPATIENT
Start: 2021-02-03 | End: 2021-02-03

## 2021-02-03 RX ORDER — INSULIN GLARGINE 100 [IU]/ML
28 INJECTION, SOLUTION SUBCUTANEOUS EVERY EVENING
Status: DISCONTINUED | OUTPATIENT
Start: 2021-02-03 | End: 2021-02-04 | Stop reason: HOSPADM

## 2021-02-03 RX ORDER — LANCETS 30 GAUGE
EACH MISCELLANEOUS
Qty: 100 EACH | Refills: 0 | Status: SHIPPED | OUTPATIENT
Start: 2021-02-03 | End: 2022-04-08 | Stop reason: SDUPTHER

## 2021-02-03 RX ORDER — ACETAMINOPHEN 325 MG/1
650 TABLET ORAL EVERY 4 HOURS PRN
Status: DISCONTINUED | OUTPATIENT
Start: 2021-02-03 | End: 2021-02-04 | Stop reason: HOSPADM

## 2021-02-03 RX ADMIN — ACETAMINOPHEN 650 MG: 325 TABLET ORAL at 22:19

## 2021-02-03 RX ADMIN — Medication 1 G: at 13:31

## 2021-02-03 RX ADMIN — METFORMIN HYDROCHLORIDE 500 MG: 500 TABLET ORAL at 17:57

## 2021-02-03 RX ADMIN — DEXAMETHASONE 10 MG: 4 TABLET ORAL at 06:26

## 2021-02-03 RX ADMIN — LEVETIRACETAM 1000 MG: 500 TABLET ORAL at 17:57

## 2021-02-03 RX ADMIN — FLUDROCORTISONE ACETATE 0.1 MG: 0.1 TABLET ORAL at 21:06

## 2021-02-03 RX ADMIN — LACOSAMIDE 100 MG: 100 TABLET, FILM COATED ORAL at 06:26

## 2021-02-03 RX ADMIN — FLUDROCORTISONE ACETATE 0.1 MG: 0.1 TABLET ORAL at 13:30

## 2021-02-03 RX ADMIN — INSULIN GLARGINE 28 UNITS: 100 INJECTION, SOLUTION SUBCUTANEOUS at 17:59

## 2021-02-03 RX ADMIN — DEXAMETHASONE 8 MG: 4 TABLET ORAL at 13:30

## 2021-02-03 RX ADMIN — LEVETIRACETAM 1000 MG: 500 TABLET ORAL at 06:26

## 2021-02-03 RX ADMIN — ACETAMINOPHEN 650 MG: 325 TABLET ORAL at 06:32

## 2021-02-03 RX ADMIN — METFORMIN HYDROCHLORIDE 500 MG: 500 TABLET ORAL at 10:34

## 2021-02-03 RX ADMIN — Medication 1 G: at 06:26

## 2021-02-03 RX ADMIN — DEXAMETHASONE 8 MG: 4 TABLET ORAL at 21:06

## 2021-02-03 RX ADMIN — LACOSAMIDE 100 MG: 100 TABLET, FILM COATED ORAL at 17:57

## 2021-02-03 RX ADMIN — Medication 1 G: at 21:06

## 2021-02-03 ASSESSMENT — PAIN DESCRIPTION - PAIN TYPE
TYPE: ACUTE PAIN

## 2021-02-03 ASSESSMENT — ENCOUNTER SYMPTOMS
HEADACHES: 1
CHILLS: 0
FEVER: 0
COUGH: 0
SHORTNESS OF BREATH: 0

## 2021-02-03 ASSESSMENT — PATIENT HEALTH QUESTIONNAIRE - PHQ9
1. LITTLE INTEREST OR PLEASURE IN DOING THINGS: NOT AT ALL
2. FEELING DOWN, DEPRESSED, IRRITABLE, OR HOPELESS: NOT AT ALL
SUM OF ALL RESPONSES TO PHQ9 QUESTIONS 1 AND 2: 0

## 2021-02-03 NOTE — PROGRESS NOTES
Diabetes education: pt just started eating today. CDE will begin education tomorrow.  Plan: CDE will meet with pt tomorrow to begin education. If appropriate, please have pt give her insulin and do finger sticks with nursing. Please send text on [employer] if needs change.

## 2021-02-03 NOTE — FACE TO FACE
Face to Face Supporting Documentation - Home Health    The encounter with this patient was in whole or in part the primary reason for home health admission.    Date of encounter:   Patient:                    MRN:                       YOB: 2023  Marni Muñoz  [id number]  1975     Home health to see patient for:  Skilled Nursing care for assessment, interventions & education, Physical Therapy evaluation and treatment and Occupational therapy evaluation and treatment    Skilled need for:  Surgical Aftercare s/p craniotomy    Skilled nursing interventions to include:  Comment: med management and monitoring    Homebound status evidenced by:  Needs the assistance of another person in order to leave the home. Leaving home requires a considerable and taxing effort. There is a normal inability to leave the home.    Community Physician to provide follow up care: Jeane Pham M.D.     Optional Interventions? No      I certify the face to face encounter for this home health care referral meets the CMS requirements and the encounter/clinical assessment with the patient was, in whole, or in part, for the medical condition(s) listed above, which is the primary reason for home health care. Based on my clinical findings: the service(s) are medically necessary, support the need for home health care, and the homebound criteria are met.  I certify that this patient has had a face to face encounter by myself.  Micha Maddox M.D. - NPI: [id number]

## 2021-02-03 NOTE — CARE PLAN
Patient expresses feelings and goals.  Problem: Communication  Goal: The ability to communicate needs accurately and effectively will improve  Outcome: PROGRESSING AS EXPECTED   Patient follows safety guidelines.  Problem: Safety  Goal: Will remain free from injury  Outcome: PROGRESSING AS EXPECTED   Patient needs bowel movement.  Problem: Bowel/Gastric:  Goal: Will not experience complications related to bowel motility  Outcome: PROGRESSING SLOWER THAN EXPECTED

## 2021-02-03 NOTE — PROGRESS NOTES
Diabetes education: Met with pt this afternoon to begin diabetes education. Reviewed how to do finger sticks and give insulin as finger stick done with hospital lancet device and insulin given by pt to left abdomen with  Syringe.  Pt taught to use insulin pens and practiced with saline pens and practice device. nsulin was discussed as well, insulin storage, shelf life and site rotation.   Discussed what diabetes was, type two, hypoglycemia,   hyperglycemia, DKA, sick day,   goals for blood sugars.  Discussed need for carbohydrates and proteins, with every meal and snack as well as why. Met with RD before CDE. Discussed the importance of the HS snack with a carbohydrate and protein. Discussed need, benefit and precautions with exercise.  Discussed  what effects blood sugars. Discussed need to follow up with PCP to adjust insulin and medications after completing decadron.  Plan: Pt needs to have RX sent to [facility] to make sure coverage. Pt will need prescriptions for Lantus solostar pens, change regular to Humalog kwik pens, dhruv pen needles, One touch verio flex meter, test strips and lancets. Please use dm /diabetes supply list at discharge . F2 to get correct meter, and supplies. Please send text via Voalte for questions.  CDE will follow up when meter and supplies arrive to teach meter. Please continue to have pt give her insulin and do finger sticks with nursing.

## 2021-02-03 NOTE — CONSULTS
Diabetes education: Pt is newly dx with diabetes and post craniotomy for meningioma resection. Pt was admitted with blood sugar of 239 and HgA1c of 9.7%. Pt was placed on Decadron ( currently beginning to taper) 10 mg every 8 hours to taper to 8 mg every 8 hours tomorrow.  Pt is on Lantus 25 units pm ( increased today from 20 units), and regular insulin sliding scale coverage ac and hs with blood sugars of 408 (14 units), 228  ( 4 units), and  432 ( 14 units). Pt is also on Metformin.  Per notes pt wanted to eat and passed her swallowing eval so tube pulled and diet started.  Plan: CDE will meet with pt tomorrow to begin education. If appropriate, please have pt give her insulin and do finger sticks with nursing. Please send text on Voalte if needs change.

## 2021-02-03 NOTE — PROGRESS NOTES
Hospital Medicine Daily Progress Note    Date of Service  2/3/2021    Chief Complaint  45 y.o. female admitted 1/21/2021 with headache    Hospital Course  45-year-old female presented with headache noted to have meningioma on CT was evaluated by neurosurgery and underwent craniotomy for resection on 1/29/2021.  She had hypoxia postoperatively and confusion with concern for subclinical seizures.  She was evaluated by neurology and critical care.  She has been maintained on Keppra and Vimpat and Decadron.  She had hypoglycemia and has been started on steroids.      Interval Problem Update    Tolerating diet  CBGs improved but still elevated  PT recommending postacute  Pathology reviewed meningioma  Patient afebrile    Consultants/Specialty  Neurology  Neurosurgery  Critical care    Code Status  Full Code    Disposition  To be determined    Review of Systems  Review of Systems   Constitutional: Negative for chills and fever.   Respiratory: Negative for cough and shortness of breath.    Neurological: Positive for headaches (Mild).   All other systems reviewed and are negative.       Physical Exam  Temp:  [37 °C (98.6 °F)] 37 °C (98.6 °F)  Pulse:  [64-88] 64  Resp:  [12-13] 12  BP: (101-111)/(57-65) 103/59  SpO2:  [93 %] 93 %    Physical Exam  Vitals signs and nursing note reviewed.   Constitutional:       General: She is not in acute distress.  HENT:      Head: Normocephalic.      Comments: Surgical wound with staples in place     Nose: Nose normal. No rhinorrhea.      Mouth/Throat:      Pharynx: No oropharyngeal exudate or posterior oropharyngeal erythema.   Eyes:      General: No scleral icterus.        Right eye: No discharge.         Left eye: No discharge.   Neck:      Musculoskeletal: Neck supple. No neck rigidity.   Cardiovascular:      Rate and Rhythm: Normal rate and regular rhythm.      Heart sounds: Normal heart sounds. No murmur. No friction rub. No gallop.    Pulmonary:      Effort: Pulmonary effort is  normal. No respiratory distress.      Breath sounds: Normal breath sounds. No stridor. No wheezing, rhonchi or rales.   Chest:      Chest wall: No tenderness.   Abdominal:      General: Bowel sounds are normal. There is no distension.      Palpations: Abdomen is soft. There is no mass.      Tenderness: There is no abdominal tenderness. There is no rebound.   Musculoskeletal:         General: No swelling or tenderness.   Skin:     General: Skin is warm and dry.      Coloration: Skin is not cyanotic or jaundiced.      Nails: There is no clubbing.     Neurological:      General: No focal deficit present.      Mental Status: She is alert and oriented to person, place, and time.      Cranial Nerves: No cranial nerve deficit.      Motor: No weakness.   Psychiatric:         Mood and Affect: Mood normal.         Behavior: Behavior normal.         Fluids    Intake/Output Summary (Last 24 hours) at 2/3/2021 0909  Last data filed at 2/2/2021 2300  Gross per 24 hour   Intake --   Output 900 ml   Net -900 ml       Laboratory  Recent Labs     02/01/21  0520   WBC 12.9*   RBC 3.84*   HEMOGLOBIN 12.2   HEMATOCRIT 36.5*   MCV 95.1   MCH 31.8   MCHC 33.4*   RDW 40.3   PLATELETCT 271   MPV 10.6     Recent Labs     02/01/21  0520 02/02/21  0423 02/03/21  0410   SODIUM 132* 132* 134*   POTASSIUM 4.5 4.3 3.9   CHLORIDE 100 98 99   CO2 24 26 23   GLUCOSE 365* 218* 179*   BUN 17 16 17   CREATININE 0.40* 0.44* 0.54   CALCIUM 7.9* 8.4* 8.9                   Imaging  DX-ABDOMEN FOR TUBE PLACEMENT   Final Result         1.  Nonspecific bowel gas pattern.   2.  Dobbhoff tube tip terminates overlying the expected location of the gastric antrum   3.  Bibasilar atelectasis or infiltrate.      MR-BRAIN-WITH & W/O   Final Result      1.  Status post resection of LEFT parietal extra-axial mass   2.  Small amount of blood in the surgical resection bed without significant mass effect   3.  Changes in the high LEFT parietal lobe adjacent to the surgical  resection bed could be related to ischemia or recent seizure activity   4.  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 malformations as previously noted      CT-HEAD W/O   Final Result      1.  Status post left parietal craniotomy/cranioplasty.   2.  Resection of left frontoparietal parafalcine meningioma.   3.  Pneumocephalus.   4.  Mixed blood products present in the left frontoparietal cavity. No significant mass effect or midline shift.      MR-VENOGRAM (MRV) HEAD   Final Result      Occlusion of the vertex mid superior sagittal sinus secondary to previously detailed parafalcine meningioma.      DX-CHEST-PORTABLE (1 VIEW)   Final Result         1.  Suspect subtle infiltrate at the left lung base.   2.  Cardiomegaly      EC-ECHOCARDIOGRAM COMPLETE W/O CONT   Final Result      DX-CHEST-LIMITED (1 VIEW)   Final Result      1.  Interval extubation.      2.  Marked interval improvement in left lung aeration with only mild residual left basilar atelectasis.      3.  Right perihilar atelectasis.      DX-CHEST-LIMITED (1 VIEW)   Final Result      1.  Endotracheal tube in right mainstem bronchus.      2.  Complete atelectatic collapse of the left lung. Recommend pulling back endotracheal tube into the trachea.      3. Slightly aberrant course of esophageal thermometer which overlies the left hemithorax. Recommend repositioning.      1.  An Emergent Document Only message has been documented for NURIS CROFT in the [employer]  Critical Result system on 1/25/2021 10:26 AM, Message ID [id number].      MR-BRAIN-WITH & W/O   Final Result      1.  43 x 42 x 44 mm left parietal parafalcine vertex meningioma with involvement of the superior sagittal sinus and the parietal calvarium. There is prominent mass effect on the left parietal lobe with mild vasogenic edema.   2.  7 mm intra-axial lesion in the right frontoparietal region with blooming artifact on GRE and a couple additional tiny  foci of blooming artifact in the right temporal lobe and left parietotemporal region. These probably represent small cavernous    malformations however follow-up is suggested or recommend comparison with old outside prior brain MRI studies.   3.  No acute infarct or intracranial hemorrhage.      DX-CHEST-PORTABLE (1 VIEW)   Final Result         1.  Basilar atelectasis versus early infiltrate      CT-HEAD W/O   Final Result         1.  Large calcified extra-axial mass at the left apex, appearance favors meningioma, there is significant mass effect on the left parietal lobe with surrounding mild vasogenic edema. Recommend follow-up characterization with MRI of the brain without and    with contrast.      These findings were discussed with the patient's clinician, Chang Haq, on 1/21/2021 10:24 PM.           Assessment/Plan  * Meningioma (HCC)- (present on admission)  Assessment & Plan  Status post craniotomy and resection on 1/29/2021  On Decadron taper neurosurgery following  On Keppra and Vimpat per neurology      Seizure (HCC)  Assessment & Plan  Continue Keppra and Vimpat will need follow-up with neurology after discharge    Diabetes mellitus (HCC)  Assessment & Plan  Newly diagnosed type II with hyperglycemia    Will increase Lantus to 28 units and add Premeal insulin  Continue sliding-scale insulin  Continue Metformin  Diabetic teaching asked RN to teach patient on self injection and monitor CBGs  We will ask case management to schedule appointment with PCP    Recent Labs     02/01/21  0922 02/01/21  1248 02/01/21  1815 02/01/21  2024 02/01/21  2313 02/02/21  0614 02/02/21  1332 02/02/21  1753 02/02/21  2155 02/03/21  0210   POCGLUCOSE 284* 396* 350* 408* 292* 228* 432* 283* 337* 190*        Obesity (BMI 30.0-34.9)  Assessment & Plan  Body mass index is 34.69 kg/m².          Leukocytosis  Assessment & Plan  Likely reactive secondary to steroids    Hyponatremia  Assessment & Plan  Improved on Florinef and  salt tablets       VTE prophylaxis: SCD

## 2021-02-03 NOTE — DISCHARGE PLANNING
[facility] Acute [facility] Transitional Care Coordination     Referral from:  Dr Herrera Maddox  Facesheet indicates: Rochelle   Potential Rehab Diagnosis:    left parietal meningioma  S/p  left craniotomy    Chart review indicates patient does have on going medical management and therapy needs.    D/C support:  Friend to be able to assist w/ transition home.      Physiatry consultation forwarded per protocol.      Last Covid test date. 1/29/2020 negative       Thank you for the referral.

## 2021-02-03 NOTE — CONSULTS
"    Physical Medicine and Rehabilitation Consultation              Date of initial consultation: 2/3/2021  Consulting provider: Micha Maddox MD  Reason for consultation: assess for acute inpatient rehab appropriateness  LOS: 12 Day(s)    Chief complaint: Meningioma    HPI: The patient is a 45 y.o. right hand dominant female with a past medical history of migraines;  who presented on 1/21/2021  9:04 PM with headache found to have meningioma on CT.  Underwent craniotomy for resection on 1/29/2021.  Postop course complicated by hypoxia and confusion with concern for subclinical seizures.  EEG did not show seizure activity, but since she had not returned to baseline she was started on Keppra and Vimpat.     The patient currently reports feeling good.  She is avoiding looking at her incision because \"out of sight out of mind\".  States she is trying to keep a good 'personality' about her recent surgery.  Endorses some occasional headache.    Social Hx:  1 SH  0 ALEX  With: Alone    Notes reflect patient will have a friend staying with her at discharge who can assist as needed    Employment: Was working at a mortgage company  Tobacco: 5.5 pack year history  Alcohol: Occasional  Drugs: Denies    THERAPY:  PT: Functional mobility   2/1: Min assist sit to stand and transfer, walking 180 feet at min assist no device    OT: ADLs  2/1: Min assist lower body dressing    SLP:   2/1: Regular diet, thin liquids    IMAGING:  MR brain with and without 1/30/2021  1.  Status post resection of LEFT parietal extra-axial mass  2.  Small amount of blood in the surgical resection bed without significant mass effect  3.  Changes in the high LEFT parietal lobe adjacent to the surgical resection bed could be related to ischemia or recent seizure activity  4.  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 malformations as previously noted    PROCEDURES:  Luke Soto MD left craniotomy 1/29    PMH:  Past " Medical History:   Diagnosis Date   • Migraine        PSH:  Past Surgical History:   Procedure Laterality Date   • CRANIOTOMY STEALTH Left 1/29/2021    Procedure: CRANIOTOMY, USING FRAMELESS STEREOTAXY-FOR TUMOR;  Surgeon: Luke Soto M.D.;  Location: SURGERY [facility];  Service: Neurosurgery   • CHOLECYSTECTOMY     • OTHER ORTHOPEDIC SURGERY      L meniscus and ACL repair       FHX:  Family History   Problem Relation Age of Onset   • Arterial Aneurysm Father        Medications:  Current Facility-Administered Medications   Medication Dose   • insulin regular (HumuLIN R,NovoLIN R) injection  5 Units   • insulin glargine (Lantus) injection  28 Units   • dexamethasone (DECADRON) tablet 8 mg  8 mg    Followed by   • [START ON 2/5/2021] dexamethasone (DECADRON) tablet 8 mg  8 mg    Followed by   • [START ON 2/7/2021] dexamethasone (DECADRON) tablet 6 mg  6 mg    Followed by   • [START ON 2/9/2021] dexamethasone (DECADRON) tablet 4 mg  4 mg    Followed by   • [START ON 2/11/2021] dexamethasone (DECADRON) tablet 2 mg  2 mg    Followed by   • [START ON 2/13/2021] dexamethasone (DECADRON) tablet 1 mg  1 mg   • lacosamide (VIMPAT) tablet 100 mg  100 mg   • levETIRAcetam (KEPPRA) tablet 1,000 mg  1,000 mg   • metFORMIN (GLUCOPHAGE) tablet 500 mg  500 mg   • fludrocortisone (FLORINEF) tablet 0.1 mg  0.1 mg   • insulin regular (HumuLIN R,NovoLIN R) injection  3-14 Units    And   • glucose 4 g chewable tablet 16 g  16 g    And   • dextrose 50% (D50W) injection 50 mL  50 mL   • ondansetron (ZOFRAN ODT) dispertab 4 mg  4 mg   • sodium chloride (SALT) tablet 1 g  1 g   • senna-docusate (PERICOLACE or SENOKOT S) 8.6-50 MG per tablet 2 Tab  2 Tab    And   • polyethylene glycol/lytes (MIRALAX) PACKET 1 Packet  1 Packet    And   • magnesium hydroxide (MILK OF MAGNESIA) suspension 30 mL  30 mL    And   • bisacodyl (DULCOLAX) suppository 10 mg  10 mg   • acetaminophen (Tylenol) tablet 650 mg  650 mg   • cloNIDine (CATAPRES)  "tablet 0.1 mg  0.1 mg   • oxyCODONE immediate release (ROXICODONE) tablet 10 mg  10 mg   • oxyCODONE immediate-release (ROXICODONE) tablet 5 mg  5 mg   • promethazine (PHENERGAN) tablet 12.5-25 mg  12.5-25 mg   • Pharmacy Consult Request ...Pain Management Review 1 Each  1 Each   • MD ALERT...DO NOT ADMINISTER NSAIDS or ASPIRIN unless ORDERED By Neurosurgery 1 Each  1 Each   • dexamethasone (DECADRON) injection 4 mg  4 mg   • diphenhydrAMINE (BENADRYL) injection 25 mg  25 mg   • scopolamine (TRANSDERM-SCOP) patch 1 Patch  1 Patch   • labetalol (NORMODYNE/TRANDATE) injection 10 mg  10 mg   • hydrALAZINE (APRESOLINE) injection 10 mg  10 mg   • morphine (pf) 4 mg/mL injection 4 mg  4 mg   • ondansetron (ZOFRAN) syringe/vial injection 4 mg  4 mg   • LORazepam (ATIVAN) injection 2 mg  2 mg   • promethazine (PHENERGAN) suppository 12.5-25 mg  12.5-25 mg   • prochlorperazine (COMPAZINE) injection 5-10 mg  5-10 mg       Allergies:  Allergies   Allergen Reactions   • Amoxicillin Rash   • Food      bananas   • Potassium      Itchy and swollen lips       Physical Exam:  Vitals: /59   Pulse 64   Temp 37 °C (98.6 °F) (Temporal)   Resp 12   Ht 1.676 m (5' 5.98\")   Wt 95.3 kg (210 lb 1.6 oz)   SpO2 93%   Gen: NAD  Head: Large incision on apex of head and extending over left side  Eyes/ Nose/ Mouth: PERRLA, moist mucous membranes  Cardio: RRR, good distal perfusion, warm extremities  Pulm: normal respiratory effort, no cyanosis   Abd: Soft NTND, negative borborygmi   Ext: No peripheral edema. No calf tenderness. No clubbing.    Mental status: answers questions appropriately follows commands  Speech: fluent, no aphasia or dysarthria    CRANIAL NERVES:  2,3: visual acuity grossly intact, PERRL  3,4,6: EOMI bilaterally, no nystagmus or diplopia  5: sensation intact to light touch bilaterally and symmetric  7: no facial asymmetry  8: hearing grossly intact  9,10: symmetric palate elevation  11: SCM/Trapezius strength 5/5 " bilaterally  12: tongue protrudes midline      Motor:      Upper Extremity  Myotome R L   Shoulder flexion C5 5 5   Elbow flexion C5 5 5   Wrist extension C6 5 5   Elbow extension C7 5 5   Finger flexion C8 5 5   Finger abduction T1 5 5     Lower Extremity Myotome R L   Hip flexion L2 5 5   Knee extension L3 5 5   Ankle dorsiflexion L4 5 5   Toe extension L5 5 5   Ankle plantarflexion S1 5 5       Sensory:   intact to light touch through out    DTRs:  Right  Left    Brachioradialis  2+  2+   Patella tendon  2+ 2+     Labs: Reviewed and significant for   Recent Labs     02/01/21  0520   RBC 3.84*   HEMOGLOBIN 12.2   HEMATOCRIT 36.5*   PLATELETCT 271     Recent Labs     02/01/21  0520 02/02/21  0423 02/03/21  0410   SODIUM 132* 132* 134*   POTASSIUM 4.5 4.3 3.9   CHLORIDE 100 98 99   CO2 24 26 23   GLUCOSE 365* 218* 179*   BUN 17 16 17   CREATININE 0.40* 0.44* 0.54   CALCIUM 7.9* 8.4* 8.9     Recent Results (from the past 24 hour(s))   ACCU-CHEK GLUCOSE    Collection Time: 02/02/21  1:32 PM   Result Value Ref Range    Glucose - Accu-Ck 432 (HH) 65 - 99 mg/dL   ACCU-CHEK GLUCOSE    Collection Time: 02/02/21  5:53 PM   Result Value Ref Range    Glucose - Accu-Ck 283 (H) 65 - 99 mg/dL   ACCU-CHEK GLUCOSE    Collection Time: 02/02/21  9:55 PM   Result Value Ref Range    Glucose - Accu-Ck 337 (H) 65 - 99 mg/dL   ACCU-CHEK GLUCOSE    Collection Time: 02/03/21  2:10 AM   Result Value Ref Range    Glucose - Accu-Ck 190 (H) 65 - 99 mg/dL   Basic Metabolic Panel    Collection Time: 02/03/21  4:10 AM   Result Value Ref Range    Sodium 134 (L) 135 - 145 mmol/L    Potassium 3.9 3.6 - 5.5 mmol/L    Chloride 99 96 - 112 mmol/L    Co2 23 20 - 33 mmol/L    Glucose 179 (H) 65 - 99 mg/dL    Bun 17 8 - 22 mg/dL    Creatinine 0.54 0.50 - 1.40 mg/dL    Calcium 8.9 8.5 - 10.5 mg/dL    Anion Gap 12.0 7.0 - 16.0   ESTIMATED GFR    Collection Time: 02/03/21  4:10 AM   Result Value Ref Range    GFR If African American >60 >60 mL/min/1.73 m 2     GFR If Non African American >60 >60 mL/min/1.73 m 2         ASSESSMENT:  Patient is a 45 y.o. female admitted with meningioma now s/p resection with left craniotomy    [place] Code / Diagnosis to Support: 0002.1 - Brain Dysfunction: Non-Traumatic    Rehabilitation: Impaired ADLs and mobility  Patient unlikely to qualify for IPR, currently walking 180 feet at min assist and is supervision with ADLs except for lower body dressing which requires min assist.    Additional Recommendations:  -Unlikely to qualify for IPR, wants to go home, has support through a friend  -Continue PT OT while in-house  -On steroid taper  -Seizure control with Vimpat and Keppra  -Pain control with Roxicodone 5 to 10 mg  -On salt tabs for hyponatremia  - Tobacco abuse cessation counseling given today for ~5 min as it pertains to vascular disease, heart attack, stroke, wound healing, and pulmonary disease.   -Minimal role for rehab, will follow up as needed    Medical Complexity:  Hyponatremia      DVT PPX: SCDs      Thank you for allowing us to participate in the care of this patient.     Patient was seen for 82 minutes on unit/floor of which > 50% of time was spent on counseling and coordination of care regarding the above, including prognosis, risk reduction, benefits of treatment, and options for next stage of care.    Mat Avila, DO   Physical Medicine and Rehabilitation

## 2021-02-03 NOTE — PROGRESS NOTES
Neurosurgery Progress Note    Subjective:  Doing well  Mild ha  No vision issues  No sz activity  + voiding  + bm  Ambulated twice yest  Wants to go home    Exam:  Awake, ox 3, clear  PERRL, conjugate, EOMs intact  Face symm, tongue midline  Str 5/5, no drift  Inc c/d w/ staples        BP  Min: 101/57  Max: 111/65  Pulse  Av.3  Min: 64  Max: 88  Resp  Av.3  Min: 12  Max: 13  Temp  Av °C (98.6 °F)  Min: 37 °C (98.6 °F)  Max: 37 °C (98.6 °F)  SpO2  Av %  Min: 93 %  Max: 93 %    No data recorded    Recent Labs     21  0520   WBC 12.9*   RBC 3.84*   HEMOGLOBIN 12.2   HEMATOCRIT 36.5*   MCV 95.1   MCH 31.8   MCHC 33.4*   RDW 40.3   PLATELETCT 271   MPV 10.6     Recent Labs     21  0520 21  0423 21  0410   SODIUM 132* 132* 134*   POTASSIUM 4.5 4.3 3.9   CHLORIDE 100 98 99   CO2 24 26 23   GLUCOSE 365* 218* 179*   BUN 17 16 17   CREATININE 0.40* 0.44* 0.54   CALCIUM 7.9* 8.4* 8.9               Intake/Output       21 - 21 0659 21 07 - 21 0659      5624-4197 5147-9331 Total  1665-4049 Total       Intake    Total Intake -- -- -- -- -- --       Output    Urine  525  550 1075  --  -- --    Number of Times Voided 1 x 2 x 3 x -- -- --    Urine Void (mL) 350 550 900 -- -- --    Output (mL) ([REMOVED] Urethral Catheter Temperature probe 16 Fr.) 175 -- 175 -- -- --    Stool  --  -- --  --  -- --    Number of Times Stooled -- 1 x 1 x -- -- --    Total Output  -- -- --       Net I/O     -525 -550 -1075 -- -- --            Intake/Output Summary (Last 24 hours) at 2/3/2021 0904  Last data filed at 2021 2300  Gross per 24 hour   Intake --   Output 900 ml   Net -900 ml            • insulin regular  5 Units TID AC   • insulin glargine  28 Units Q EVENING   • dexamethasone  8 mg Q8HRS    Followed by   • [START ON 2021] dexamethasone  8 mg Q12HRS    Followed by   • [START ON 2021] dexamethasone  6 mg Q12HRS    Followed by   • [START ON  2/9/2021] dexamethasone  4 mg Q12HRS    Followed by   • [START ON 2/11/2021] dexamethasone  2 mg Q12HRS    Followed by   • [START ON 2/13/2021] dexamethasone  1 mg Q12HRS   • lacosamide  100 mg BID   • levETIRAcetam  1,000 mg BID   • metFORMIN  500 mg BID WITH MEALS   • fludrocortisone  0.1 mg BID   • insulin regular  3-14 Units 4X/DAY ACHS    And   • glucose  16 g Q15 MIN PRN    And   • dextrose 50%  50 mL Q15 MIN PRN   • ondansetron  4 mg Q4HRS PRN   • sodium chloride  1 g Q8HRS   • senna-docusate  2 Tab BID    And   • polyethylene glycol/lytes  1 Packet QDAY PRN    And   • magnesium hydroxide  30 mL QDAY PRN    And   • bisacodyl  10 mg QDAY PRN   • acetaminophen  650 mg Q6HRS PRN   • cloNIDine  0.1 mg Q4HRS PRN   • oxyCODONE immediate release  10 mg Q3HRS PRN   • oxyCODONE immediate-release  5 mg Q3HRS PRN   • promethazine  12.5-25 mg Q4HRS PRN   • Pharmacy Consult Request  1 Each PHARMACY TO DOSE   • MD ALERT...DO NOT ADMINISTER NSAIDS or ASPIRIN unless ORDERED By Neurosurgery  1 Each PRN   • dexamethasone  4 mg Once PRN   • diphenhydrAMINE  25 mg Q6HRS PRN   • scopolamine  1 Patch Q72HRS PRN   • labetalol  10 mg Q HOUR PRN   • hydrALAZINE  10 mg Q HOUR PRN   • morphine injection  4 mg Q3HRS PRN   • ondansetron  4 mg Q4HRS PRN   • LORazepam  2 mg Q5 MIN PRN   • promethazine  12.5-25 mg Q4HRS PRN   • prochlorperazine  5-10 mg Q4HRS PRN       Assessment and Plan:  Hospital day #12 left parietal meningioma  POD #5, left craniotomy  Final path - Meningioma WHO grade 1, transitional subtype with psammomatous calcifications.  Prophylactic anticoagulation: hold         Start date/time: pt ambulatory  MRI 1/30 shows no stroke, no venus congestion or edema next to resection cavity   keppra 1000 BID, vimpat- per neurology  Dec taper  Neuro checks Q4  Hyperglycemia- improved  - on lantus, regular insulin  Hyponatremia - 134 on salt tabs,  florinef  Bowel protocol  Reg diet  Pt/ot/mobilize  Transfer to floor pending  Dispo  per IM

## 2021-02-03 NOTE — CARE PLAN
Problem: Communication  Goal: The ability to communicate needs accurately and effectively will improve  2/2/2021 1844 by Alyssa Randolph R.N.  Outcome: PROGRESSING AS EXPECTED  2/2/2021 1431 by Alyssa Randolph R.N.  Outcome: PROGRESSING AS EXPECTED     Problem: Safety  Goal: Will remain free from injury  2/2/2021 1844 by Alyssa Randolph R.N.  Outcome: PROGRESSING AS EXPECTED  2/2/2021 1431 by Alyssa Randolph R.N.  Outcome: PROGRESSING AS EXPECTED  Goal: Will remain free from falls  2/2/2021 1844 by Alyssa Randolph R.N.  Outcome: PROGRESSING AS EXPECTED  2/2/2021 1431 by Alyssa Randolph R.N.  Outcome: PROGRESSING AS EXPECTED     Problem: Infection  Goal: Will remain free from infection  2/2/2021 1844 by Alyssa Randolph R.N.  Outcome: PROGRESSING AS EXPECTED  2/2/2021 1431 by Alyssa Randolph R.N.  Outcome: PROGRESSING AS EXPECTED     Problem: Venous Thromboembolism (VTW)/Deep Vein Thrombosis (DVT) Prevention:  Goal: Patient will participate in Venous Thrombosis (VTE)/Deep Vein Thrombosis (DVT)Prevention Measures  2/2/2021 1844 by Alyssa Randolph R.N.  Outcome: PROGRESSING AS EXPECTED  2/2/2021 1431 by Alyssa Randolph R.N.  Outcome: PROGRESSING AS EXPECTED     Problem: Bowel/Gastric:  Goal: Normal bowel function is maintained or improved  2/2/2021 1844 by Alyssa Randolph R.N.  Outcome: PROGRESSING AS EXPECTED  2/2/2021 1431 by Alyssa Randolph R.N.  Outcome: PROGRESSING SLOWER THAN EXPECTED  Goal: Will not experience complications related to bowel motility  2/2/2021 1844 by Alyssa Randolph R.N.  Outcome: PROGRESSING AS EXPECTED  2/2/2021 1431 by Alyssa Randolph R.N.  Outcome: PROGRESSING SLOWER THAN EXPECTED     Problem: Knowledge Deficit  Goal: Knowledge of disease process/condition, treatment plan, diagnostic tests, and medications will improve  2/2/2021 1844 by Alyssa Randolph R.N.  Outcome: PROGRESSING AS EXPECTED  2/2/2021 1431 by Alyssa Randolph R.N.  Outcome: PROGRESSING AS EXPECTED  Goal: Knowledge of the prescribed therapeutic regimen will  improve  2/2/2021 1844 by Alyssa Randolph R.N.  Outcome: PROGRESSING AS EXPECTED  2/2/2021 1431 by DILEEP ZacariasN.  Outcome: PROGRESSING AS EXPECTED     Problem: Discharge Barriers/Planning  Goal: Patient's continuum of care needs will be met  2/2/2021 1844 by Alyssa Randolph R.N.  Outcome: PROGRESSING AS EXPECTED  2/2/2021 1431 by DILEEP ZacariasN.  Outcome: PROGRESSING AS EXPECTED     Problem: Pain Management  Goal: Pain level will decrease to patient's comfort goal  2/2/2021 1844 by DILEEP ZacariasN.  Outcome: PROGRESSING AS EXPECTED  2/2/2021 1431 by Alyssa Randolph R.N.  Outcome: PROGRESSING AS EXPECTED     Problem: Psychosocial Needs:  Goal: Level of anxiety will decrease  2/2/2021 1844 by Alyssa Randolph R.N.  Outcome: PROGRESSING AS EXPECTED  2/2/2021 1431 by Alyssa Randolph R.N.  Outcome: PROGRESSING AS EXPECTED     Problem: Respiratory:  Goal: Respiratory status will improve  2/2/2021 1844 by Alyssa Randolph R.N.  Outcome: PROGRESSING AS EXPECTED  2/2/2021 1431 by NELI Zacarias.N.  Outcome: PROGRESSING AS EXPECTED     Problem: Craniotomy surgery  Goal: Post op care of the craniotomy patient  2/2/2021 1844 by Alyssa Randolph R.N.  Outcome: PROGRESSING AS EXPECTED  2/2/2021 1431 by DILEEP ZacariasN.  Outcome: PROGRESSING AS EXPECTED     Problem: Neuro Status  Goal: Monitor neuro status and rapid identification of neuro changes  2/2/2021 1844 by Alyssa Randolph R.N.  Outcome: PROGRESSING AS EXPECTED  2/2/2021 1431 by NELI Zacarias.N.  Outcome: PROGRESSING AS EXPECTED     Problem: Mobility  Goal: Risk for activity intolerance will decrease  2/2/2021 1844 by Alyssa Randolph R.N.  Outcome: PROGRESSING AS EXPECTED  2/2/2021 1431 by DILEEP ZacariasN.  Outcome: PROGRESSING AS EXPECTED     Problem: Skin Integrity  Goal: Risk for impaired skin integrity will decrease  2/2/2021 1844 by Alyssa Randolph R.N.  Outcome: PROGRESSING AS EXPECTED  2/2/2021 1431 by Alyssa Randolph R.N.  Outcome: PROGRESSING AS EXPECTED

## 2021-02-04 ENCOUNTER — PHARMACY VISIT (OUTPATIENT)
Dept: PHARMACY | Facility: MEDICAL CENTER | Age: 46
End: 2021-02-04
Payer: COMMERCIAL

## 2021-02-04 VITALS
RESPIRATION RATE: 12 BRPM | HEIGHT: 66 IN | HEART RATE: 73 BPM | TEMPERATURE: 97.6 F | OXYGEN SATURATION: 96 % | DIASTOLIC BLOOD PRESSURE: 64 MMHG | WEIGHT: 210.1 LBS | SYSTOLIC BLOOD PRESSURE: 105 MMHG | BODY MASS INDEX: 33.77 KG/M2

## 2021-02-04 PROBLEM — J98.11 ATELECTASIS: Status: RESOLVED | Noted: 2021-01-25 | Resolved: 2021-02-04

## 2021-02-04 PROBLEM — D72.829 LEUKOCYTOSIS: Status: RESOLVED | Noted: 2021-01-24 | Resolved: 2021-02-04

## 2021-02-04 LAB
GLUCOSE BLD-MCNC: 200 MG/DL (ref 65–99)
GLUCOSE BLD-MCNC: 204 MG/DL (ref 65–99)

## 2021-02-04 PROCEDURE — A9270 NON-COVERED ITEM OR SERVICE: HCPCS | Performed by: INTERNAL MEDICINE

## 2021-02-04 PROCEDURE — A9270 NON-COVERED ITEM OR SERVICE: HCPCS | Performed by: HOSPITALIST

## 2021-02-04 PROCEDURE — 82962 GLUCOSE BLOOD TEST: CPT

## 2021-02-04 PROCEDURE — 99239 HOSP IP/OBS DSCHRG MGMT >30: CPT | Performed by: HOSPITALIST

## 2021-02-04 PROCEDURE — 700102 HCHG RX REV CODE 250 W/ 637 OVERRIDE(OP): Performed by: INTERNAL MEDICINE

## 2021-02-04 PROCEDURE — A9270 NON-COVERED ITEM OR SERVICE: HCPCS | Performed by: STUDENT IN AN ORGANIZED HEALTH CARE EDUCATION/TRAINING PROGRAM

## 2021-02-04 PROCEDURE — 700102 HCHG RX REV CODE 250 W/ 637 OVERRIDE(OP): Performed by: CLINICAL NURSE SPECIALIST

## 2021-02-04 PROCEDURE — 700102 HCHG RX REV CODE 250 W/ 637 OVERRIDE(OP): Performed by: STUDENT IN AN ORGANIZED HEALTH CARE EDUCATION/TRAINING PROGRAM

## 2021-02-04 PROCEDURE — A9270 NON-COVERED ITEM OR SERVICE: HCPCS | Performed by: CLINICAL NURSE SPECIALIST

## 2021-02-04 PROCEDURE — 700102 HCHG RX REV CODE 250 W/ 637 OVERRIDE(OP): Performed by: HOSPITALIST

## 2021-02-04 PROCEDURE — 99232 SBSQ HOSP IP/OBS MODERATE 35: CPT | Performed by: PHYSICAL MEDICINE & REHABILITATION

## 2021-02-04 PROCEDURE — RXMED WILLOW AMBULATORY MEDICATION CHARGE: Performed by: HOSPITALIST

## 2021-02-04 RX ORDER — DEXAMETHASONE 2 MG/1
TABLET ORAL
Qty: 66 TAB | Refills: 0 | Status: SHIPPED | OUTPATIENT
Start: 2021-02-04 | End: 2021-02-16

## 2021-02-04 RX ORDER — LACOSAMIDE 100 MG/1
100 TABLET ORAL 2 TIMES DAILY
Qty: 60 TAB | Refills: 0 | Status: SHIPPED | OUTPATIENT
Start: 2021-02-04 | End: 2021-03-06

## 2021-02-04 RX ORDER — SODIUM CHLORIDE 1 G/1
1 TABLET ORAL
Qty: 14 TAB | Refills: 0 | Status: SHIPPED | OUTPATIENT
Start: 2021-02-04 | End: 2021-02-11

## 2021-02-04 RX ORDER — LEVETIRACETAM 1000 MG/1
1000 TABLET ORAL 2 TIMES DAILY
Qty: 60 TAB | Refills: 0 | Status: SHIPPED | OUTPATIENT
Start: 2021-02-04

## 2021-02-04 RX ADMIN — DEXAMETHASONE 8 MG: 4 TABLET ORAL at 05:25

## 2021-02-04 RX ADMIN — LACOSAMIDE 100 MG: 100 TABLET, FILM COATED ORAL at 05:25

## 2021-02-04 RX ADMIN — LEVETIRACETAM 1000 MG: 500 TABLET ORAL at 17:17

## 2021-02-04 RX ADMIN — DOCUSATE SODIUM 50 MG AND SENNOSIDES 8.6 MG 2 TABLET: 8.6; 5 TABLET, FILM COATED ORAL at 05:25

## 2021-02-04 RX ADMIN — LEVETIRACETAM 1000 MG: 500 TABLET ORAL at 05:25

## 2021-02-04 RX ADMIN — FLUDROCORTISONE ACETATE 0.1 MG: 0.1 TABLET ORAL at 09:05

## 2021-02-04 RX ADMIN — METFORMIN HYDROCHLORIDE 500 MG: 500 TABLET ORAL at 18:08

## 2021-02-04 RX ADMIN — LACOSAMIDE 100 MG: 100 TABLET, FILM COATED ORAL at 17:18

## 2021-02-04 RX ADMIN — DEXAMETHASONE 8 MG: 4 TABLET ORAL at 13:36

## 2021-02-04 RX ADMIN — ACETAMINOPHEN 650 MG: 325 TABLET ORAL at 13:16

## 2021-02-04 RX ADMIN — METFORMIN HYDROCHLORIDE 500 MG: 500 TABLET ORAL at 09:05

## 2021-02-04 RX ADMIN — Medication 1 G: at 13:36

## 2021-02-04 RX ADMIN — Medication 1 G: at 05:25

## 2021-02-04 ASSESSMENT — PATIENT HEALTH QUESTIONNAIRE - PHQ9
3. TROUBLE FALLING OR STAYING ASLEEP OR SLEEPING TOO MUCH: NOT AT ALL
1. LITTLE INTEREST OR PLEASURE IN DOING THINGS: NOT AT ALL
2. FEELING DOWN, DEPRESSED, IRRITABLE, OR HOPELESS: NOT AT ALL
8. MOVING OR SPEAKING SO SLOWLY THAT OTHER PEOPLE COULD HAVE NOTICED. OR THE OPPOSITE, BEING SO FIGETY OR RESTLESS THAT YOU HAVE BEEN MOVING AROUND A LOT MORE THAN USUAL: NOT AT ALL
7. TROUBLE CONCENTRATING ON THINGS, SUCH AS READING THE NEWSPAPER OR WATCHING TELEVISION: NOT AT ALL
SUM OF ALL RESPONSES TO PHQ QUESTIONS 1-9: 0
4. FEELING TIRED OR HAVING LITTLE ENERGY: NOT AT ALL
6. FEELING BAD ABOUT YOURSELF - OR THAT YOU ARE A FAILURE OR HAVE LET YOURSELF OR YOUR FAMILY DOWN: NOT AL ALL
SUM OF ALL RESPONSES TO PHQ9 QUESTIONS 1 AND 2: 0
9. THOUGHTS THAT YOU WOULD BE BETTER OFF DEAD, OR OF HURTING YOURSELF: NOT AT ALL
5. POOR APPETITE OR OVEREATING: NOT AT ALL

## 2021-02-04 ASSESSMENT — PAIN DESCRIPTION - PAIN TYPE: TYPE: ACUTE PAIN;SURGICAL PAIN

## 2021-02-04 NOTE — DISCHARGE PLANNING
TCC.   Physiatry consult completed; -unlikely to qualify for IPR as pt  wants to go home.     Post Acute Recommendations made for home health.     Thank you for referral/

## 2021-02-04 NOTE — DISCHARGE PLANNING
Vimpat was approved. $75 out of pocket cost to the patient. Meds to beds to speak with pt about cost. Might need an approved service if pt can not afford.

## 2021-02-04 NOTE — DISCHARGE PLANNING
Received PA request from [facility]. Vimpat is non formulary for Rochelle RUSH.     Prior authorization completed via "[employer]" - Key # [id number]. PA can take 24 hours to receive a determination.     Called Kayli at [phone] to see if the prior authorization could be expedited as the patient is due to discharge today. Reta states that it can still take up to 24 hours but that she marked it as 'URGENT'. Urgent requests typically process faster than 24 hours per Reta. Updated unit MARGARET Cruz.    Notified by Rochelle that the PA for Vimpat 100 mg tablets has been approved. Called Hoa with [facility] to let her know. Hoa ran the medication and it showed covered by insurance with a $75 out of pocket cost for the patient. Updated Nancy HARRIS via Teams.

## 2021-02-04 NOTE — CARE PLAN
Problem: Communication  Goal: The ability to communicate needs accurately and effectively will improve  Outcome: PROGRESSING AS EXPECTED     Problem: Safety  Goal: Will remain free from injury  Outcome: PROGRESSING AS EXPECTED  Goal: Will remain free from falls  Outcome: PROGRESSING AS EXPECTED     Problem: Infection  Goal: Will remain free from infection  Outcome: PROGRESSING AS EXPECTED     Problem: Venous Thromboembolism (VTW)/Deep Vein Thrombosis (DVT) Prevention:  Goal: Patient will participate in Venous Thrombosis (VTE)/Deep Vein Thrombosis (DVT)Prevention Measures  Outcome: PROGRESSING AS EXPECTED     Problem: Bowel/Gastric:  Goal: Normal bowel function is maintained or improved  Outcome: PROGRESSING AS EXPECTED  Goal: Will not experience complications related to bowel motility  Outcome: PROGRESSING AS EXPECTED     Problem: Knowledge Deficit  Goal: Knowledge of disease process/condition, treatment plan, diagnostic tests, and medications will improve  Outcome: PROGRESSING AS EXPECTED  Goal: Knowledge of the prescribed therapeutic regimen will improve  Outcome: PROGRESSING AS EXPECTED     Problem: Discharge Barriers/Planning  Goal: Patient's continuum of care needs will be met  Outcome: PROGRESSING AS EXPECTED     Problem: Pain Management  Goal: Pain level will decrease to patient's comfort goal  Outcome: PROGRESSING AS EXPECTED     Problem: Psychosocial Needs:  Goal: Level of anxiety will decrease  Outcome: PROGRESSING AS EXPECTED     Problem: Craniotomy surgery  Goal: Post op care of the craniotomy patient  Outcome: PROGRESSING AS EXPECTED     Problem: Neuro Status  Goal: Monitor neuro status and rapid identification of neuro changes  Outcome: PROGRESSING AS EXPECTED     Problem: Mobility  Goal: Risk for activity intolerance will decrease  Outcome: PROGRESSING AS EXPECTED     Problem: Skin Integrity  Goal: Risk for impaired skin integrity will decrease  Outcome: PROGRESSING AS EXPECTED

## 2021-02-04 NOTE — DISCHARGE PLANNING
LSW spoke with Marily from  and updated her regarding scheduling pt's PCP. Marily reports that they can submit orders to NS office if Dr. Soto is agreeable.     [facility] PA reports that Dr. Soto will sign the  orders in the interim. Marily with  is aware.     Spoke with MD about prior auth. Per MD pt needs to be on Vimpat and reports the reason is because Keppra was not efficient. RN CM aware for prior auth.

## 2021-02-04 NOTE — DISCHARGE PLANNING
Received completed DMV physician form. LSW assisting pt with providing her 's ID number on document. LSW faxed completed document ([phone]). Fax confirmation stated complete.

## 2021-02-04 NOTE — DISCHARGE PLANNING
LSW was informed that prior auth is needed for pt's Vimpat. LSW asked for the assistance from RN CM's through [facility] to assist as LSW can not complete prior auth's.

## 2021-02-04 NOTE — DISCHARGE PLANNING
"Anticipated Discharge Disposition: Home with HH    Action: LSW met with patient & discussed her dc plan & needs. Patient feels confident managing her diabetes and started crying \"happy tears\" that she's able to go home.     Patient agrees with HH services and gave verbal consent.   1) [facility]   2) [facility].   LSW faxed document to [facility] for processing.     Meds to beds is to bring pt's medications and diabetes supplies.     W provided Dr. Herrera Maddox with the DMV physician report has pt can not drive at this time. She is aware & agreeable.     Patient voiced that her friends are planning on staying with her & will transport her home. \"I'm so happy. Cant wait to sleep in my own bed.\"     Barriers to Discharge: HH acceptance & final diabetes education & getting her supplies     Plan: Follow up with [facility] regarding referral   "

## 2021-02-04 NOTE — DISCHARGE PLANNING
Meds-to-Beds: Discharge prescription orders listed below delivered to patient's bedside. JORDAN Shah notified. Gave insulins to JORDAN Dorantes to store in refrigerator until discharge. Patient counseled. Patient elected to have co-payment billed to patient account. Dosing calendar for dexamethasone provided.       Marni Muñoz   [place] Medication Instructions CHAN:[id number]    Printed on:02/04/21 2863   Medication Information                      Blood Glucose Monitoring Suppl (BLOOD GLUCOSE MONITOR SYSTEM) w/Device Kit  Test blood sugar as recommended by provider             dexamethasone (DECADRON) 2 MG tablet  Take 4 Tabs by mouth every 8 hours for 2 days, THEN 4 Tabs 2 times a day for 2 days, THEN 3 Tabs 2 times a day for 2 days, THEN 2 Tabs 2 times a day for 2 days, THEN 1 Tab 2 times a day for 2 days, THEN 0.5 Tab 2 times a day for 2 days.             glucose blood strip  Use one strip to test blood sugar three times daily             insulin glargine (LANTUS SOLOSTAR) 100 UNIT/ML Solution Pen-injector injection  Inject 30 Units under the skin every evening.             insulin lispro (HUMALOG) 100 UNIT/ML Solution Pen-injector injection PEN  Inject 5 Units under the skin 3 times a day before meals.             Insulin Pen Needle 32 G x 4 mm  Use as directed with insulin pen             Lancets  Use one One Touch Verio Flex lancet to test blood sugar three times daily.             levetiracetam (KEPPRA) 1000 MG tablet  Take 1 Tab by mouth 2 Times a Day.             metFORMIN (GLUCOPHAGE) 500 MG Tab  Take 1 Tab by mouth 2 times a day, with meals.             sodium chloride (SALT) 1 GM Tab  Take 1 Tab by mouth 2 (two) times a day for 7 days.               1435: Prior authorization approved for Vimpat with $75 co-payment.   Meds-to-Beds: Discharge prescription order listed below delivered to patient's bedside. JORDAN Shah notified. Patient counseled. Patient elected to have co-payment billed to patient account.        Marni Muñoz   Home Medication Instructions CHAN:[id number]    Printed on:02/04/21 5565   Medication Information                      lacosamide (VIMPAT) 100 MG Tab tablet  Take 1 Tab by mouth 2 Times a Day for 30 days.               Elif Montilla, PharmD

## 2021-02-04 NOTE — DISCHARGE PLANNING
"Unable to schedule a f/u appointment with pt's PCP as the physician pt used to see no longer is with that practice. Also, their practice has switched to a \"certified program\" that requires an annual fee of about $1,500.     Patient agreeable with establishing with a new PCP.     LSW called [facility] schedulers who are coordinating.     Once apt has been scheduled,  will be notified.   "

## 2021-02-04 NOTE — PROGRESS NOTES
Neurosurgery Progress Note    Subjective:  Doing well  Mild ha- taking tylenol only  No sz activity  + voiding  + bm  Ambulatory  ready to go home    Exam:  Awake, ox 3, clear  PERRL, conjugate, EOMs intact  Face symm, tongue midline  Str 5/5, no drift  Inc c/d w/ staples        BP  Min: 90/55  Max: 106/63  Pulse  Av.3  Min: 61  Max: 81  Temp  Av.8 °C (98.3 °F)  Min: 36.7 °C (98.1 °F)  Max: 37.1 °C (98.7 °F)  SpO2  Av.3 %  Min: 95 %  Max: 96 %    No data recorded        Recent Labs     21  0423 21  0410   SODIUM 132* 134*   POTASSIUM 4.3 3.9   CHLORIDE 98 99   CO2 26 23   GLUCOSE 218* 179*   BUN 16 17   CREATININE 0.44* 0.54   CALCIUM 8.4* 8.9               Intake/Output       21 - 21 0659 21 - 21 0659      4131-3987 8857-0432 Total 7503-1845 1554-1200 Total       Intake    P.O.  720  250 970  --  -- --    P.O. 720 250 970 -- -- --    Total Intake 720 250 970 -- -- --       Output    Urine  400  1100 1500  --  -- --    Number of Times Voided 2 x 2 x 4 x -- -- --    Urine Void (mL) 400 1100 1500 -- -- --    Total Output 400 1100 1500 -- -- --       Net I/O     320 -850 -530 -- -- --            Intake/Output Summary (Last 24 hours) at 2021 0815  Last data filed at 2021 0625  Gross per 24 hour   Intake 970 ml   Output 1500 ml   Net -530 ml            • insulin regular  5 Units TID AC   • insulin glargine  28 Units Q EVENING   • acetaminophen  650 mg Q4HRS PRN   • dexamethasone  8 mg Q8HRS    Followed by   • [START ON 2021] dexamethasone  8 mg Q12HRS    Followed by   • [START ON 2021] dexamethasone  6 mg Q12HRS    Followed by   • [START ON 2021] dexamethasone  4 mg Q12HRS    Followed by   • [START ON 2021] dexamethasone  2 mg Q12HRS    Followed by   • [START ON 2021] dexamethasone  1 mg Q12HRS   • lacosamide  100 mg BID   • levETIRAcetam  1,000 mg BID   • metFORMIN  500 mg BID WITH MEALS   • fludrocortisone  0.1 mg BID   • insulin  regular  3-14 Units 4X/DAY ACHS    And   • glucose  16 g Q15 MIN PRN    And   • dextrose 50%  50 mL Q15 MIN PRN   • ondansetron  4 mg Q4HRS PRN   • sodium chloride  1 g Q8HRS   • senna-docusate  2 Tab BID    And   • polyethylene glycol/lytes  1 Packet QDAY PRN    And   • magnesium hydroxide  30 mL QDAY PRN    And   • bisacodyl  10 mg QDAY PRN   • cloNIDine  0.1 mg Q4HRS PRN   • oxyCODONE immediate release  10 mg Q3HRS PRN   • oxyCODONE immediate-release  5 mg Q3HRS PRN   • promethazine  12.5-25 mg Q4HRS PRN   • Pharmacy Consult Request  1 Each PHARMACY TO DOSE   • MD ALERT...DO NOT ADMINISTER NSAIDS or ASPIRIN unless ORDERED By Neurosurgery  1 Each PRN   • dexamethasone  4 mg Once PRN   • diphenhydrAMINE  25 mg Q6HRS PRN   • scopolamine  1 Patch Q72HRS PRN   • labetalol  10 mg Q HOUR PRN   • hydrALAZINE  10 mg Q HOUR PRN   • morphine injection  4 mg Q3HRS PRN   • ondansetron  4 mg Q4HRS PRN   • LORazepam  2 mg Q5 MIN PRN   • promethazine  12.5-25 mg Q4HRS PRN   • prochlorperazine  5-10 mg Q4HRS PRN       Assessment and Plan:  Hospital day #13 left parietal meningioma  POD #6, left craniotomy  Final path - Meningioma WHO grade 1, transitional subtype with psammomatous calcifications.  Prophylactic anticoagulation: no       Start date/time: pt ambulatory  MRI 1/30 shows no stroke, no venus congestion or edema next to resection cavity   keppra 1000 BID, vimpat- per neurology  Dec taper  All rx sent by Dr Herrera Maddox  Discharge home  F/u 2 weeks  No asa/nsaids  Ok to shower /pat inc dry/leave open to air    ATTENDING ADDENDUM:  Patient seen independently and agree with above note

## 2021-02-04 NOTE — DISCHARGE PLANNING
Received Choice form at 0815  Ag[facility] Name: [facility]  Referral sent per Choice form @ 0809     Ag[facility] Name: [facility]  Spoke To: Marily  Outcome: Need PCP set up as current PCP hasnt seen pt since 2015.

## 2021-02-04 NOTE — DISCHARGE SUMMARY
Discharge Summary    CHIEF COMPLAINT ON ADMISSION  Chief Complaint   Patient presents with   • Headache     x 3 days and worsening; pt reports the area of pain is all the way around the front of her head; pt reports hx of migraines and that this one feels much worse and it not alleviated with her typical antinflamatory meds; pt reports vision changes including things appearing different colors between eyes, diplopia, and blurred vision   • Sent from Urgent Care     pt has familial of aneurysms and brain bleeds   • Nausea   • Diarrhea       Reason for Admission  Severe Headache      Admission Date  1/21/2021    CODE STATUS  Full Code    HPI & HOSPITAL COURSE  This is a 45 y.o. female here with headache was noted to have large left-sided extra-axial calcified mass on CT. she was evaluated by neurosurgery and underwent craniotomy for resection of a large meningioma on 1/29/2021.  She was monitored postoperatively in the intensive care unit.  She had a seizure postoperatively and was maintained on Keppra there was concern for possible subclinical seizures she was evaluated by neurology and Vimpat was added.  She was on Decadron and noted to have hyperglycemia with elevated HbA1c likely secondary to undiagnosed diabetes.  She was started on Lantus and insulin sliding scale and Metformin.  She is clinically improved has been ambulating and tolerating her diet.  She is only having a mild headache which has been controlled with Tylenol.  She has not had any recurrence of her seizures.  She is anxious to be discharged home.  She received diabetic teaching and I discussed with her today the importance to check her blood sugars 3 times daily I reviewed with her the symptoms and treatment for hypoglycemia and discussed that her insulin requirements are likely to decrease as she is tapered off Decadron and recommended close follow-up after discharge with her primary care provider.  Reviewed with her that she should not be  driving until she is reevaluated by neurology and cleared.  I have completed the DMV form and provided to the  to submit to the DMV.  She has been on salt tablets for mild hyponatremia we will continue those for 1 week and she will need follow-up chemistry panel      Therefore, she is discharged in good and stable condition to home with close outpatient follow-up.    The patient met 2-midnight criteria for an inpatient stay at the time of discharge.    Discharge Date  2/4/2021    FOLLOW UP ITEMS POST DISCHARGE  Follow-up with PCP with CBG log  Follow-up with neurology  Follow-up with neurosurgery  Recheck chemistry panel in 1 week    DISCHARGE DIAGNOSES  Principal Problem:    Meningioma (HCC) POA: Yes  Active Problems:    Seizure (HCC) POA: Unknown    Diabetes mellitus (HCC) POA: Unknown    Obesity (BMI 30.0-34.9) POA: Unknown    Brain lesion POA: Unknown    Hyponatremia POA: Unknown  Resolved Problems:    Hypoxia POA: Unknown    Leukocytosis POA: Unknown    Atelectasis POA: Unknown      FOLLOW UP  No future appointments.  [facility]  [address]  [facility] [address]  [phone]  In 2 weeks      Jeane Pham M.D.  [address]  [address]  [place] [id number] [phone]    In 1 week      Luke Soto M.D.  [address]  [place] [id number] [phone]            MEDICATIONS ON DISCHARGE     Medication List      START taking these medications      Instructions   Blood Glucose Monitor System w/Device Kit   Doctor's comments: Or per formulary preference. ICD-10 code: E11.65 Uncontrolled type 2 Diabetes Mellitus  Test blood sugar as recommended by provider     dexamethasone 2 MG tablet  Start taking on: February 4, 2021  Commonly known as: DECADRON   Take 4 Tabs by mouth every 8 hours for 2 days, THEN 4 Tabs 2 times a day for 2 days, THEN 3 Tabs 2 times a day for 2 days, THEN 2 Tabs 2 times a day for 2 days, THEN 1 Tab 2 times a day for 2 days, THEN 0.5 Tab 2  times a day for 2 days.     glucose blood strip   Doctor's comments: Or per formulary preference. ICD-10 code: E11.65 Uncontrolled type 2 Diabetes Mellitus  Use one strip to test blood sugar three times daily     insulin lispro 100 UNIT/ML Sopn injection PEN  Commonly known as: HumaLOG   Inject 5 Units under the skin 3 times a day before meals.  Dose: 5 Units     Insulin Pen Needle 32 G x 4 mm   Use as directed with insulin pen     lacosamide 100 MG Tabs tablet  Commonly known as: VIMPAT   Take 1 Tab by mouth 2 Times a Day for 30 days.  Dose: 100 mg     Lancets   Doctor's comments: Or per formulary preference. ICD-10 code: E11.65 Uncontrolled type 2 Diabetes Mellitus  Use one One Touch Verio Flex lancet to test blood sugar three times daily.     Lantus SoloStar 100 UNIT/ML Sopn injection  Generic drug: insulin glargine   Inject 30 Units under the skin every evening.  Dose: 30 Units     levetiracetam 1000 MG tablet  Commonly known as: KEPPRA   Take 1 Tab by mouth 2 Times a Day.  Dose: 1,000 mg     metFORMIN 500 MG Tabs  Commonly known as: GLUCOPHAGE   Take 1 Tab by mouth 2 times a day, with meals.  Dose: 500 mg     sodium chloride 1 GM Tabs  Commonly known as: SALT   Take 1 Tab by mouth 2 (two) times a day for 7 days.  Dose: 1 g        CONTINUE taking these medications      Instructions   acetaminophen 500 MG Tabs  Commonly known as: TYLENOL   Take 1,000 mg by mouth 2 times a day as needed for Moderate Pain.  Dose: 1,000 mg     OCUVITE PO   Take 1 Tab by mouth every day.  Dose: 1 Tab     Vitamin C 1000 MG Tabs   Take 1 Tab by mouth every day.  Dose: 1 Tab        STOP taking these medications    azithromycin 250 MG Tabs  Commonly known as: ZITHROMAX     ibuprofen 200 MG Tabs  Commonly known as: MOTRIN     PRENATAL 1 PO     Tylenol Cold/Flu/Cough Night 5-6.25- MG/15ML Liqd  Generic drug: Phenyleph-Doxylamine-DM-APAP            Allergies  Allergies   Allergen Reactions   • Amoxicillin Rash   • Food      bananas    • Potassium      Itchy and swollen lips       DIET  Orders Placed This Encounter   Procedures   • Diet Order Diet: Consistent CHO (Diabetic)     Standing Status:   Standing     Number of Occurrences:   1     Order Specific Question:   Diet:     Answer:   Consistent CHO (Diabetic) [4]       ACTIVITY  As tolerated.  Weight bearing as tolerated    CONSULTATIONS  Neurosurgery  Neurology  Critical care    PROCEDURES  1.  Left-sided craniotomy greater than 5 cm for resection of meningioma.        LABORATORY  Lab Results   Component Value Date    SODIUM 134 (L) 02/03/2021    POTASSIUM 3.9 02/03/2021    CHLORIDE 99 02/03/2021    CO2 23 02/03/2021    GLUCOSE 179 (H) 02/03/2021    BUN 17 02/03/2021    CREATININE 0.54 02/03/2021        Lab Results   Component Value Date    WBC 12.9 (H) 02/01/2021    HEMOGLOBIN 12.2 02/01/2021    HEMATOCRIT 36.5 (L) 02/01/2021    PLATELETCT 271 02/01/2021        Total time of the discharge process exceeds 35 minutes.

## 2021-02-05 NOTE — PROGRESS NOTES
Pt provided education and discharge information. IV removed, catheter intact. Pt escorted with employee in a wc to friend's car.

## 2021-02-05 NOTE — PROGRESS NOTES
Diabetes education: Met with pt and friend this evening. Pt's supplies were delivered. CDE instructed on One touch verio flex meter and pt did return demo with blood sugar of 230. Pt was staying for dinner so Humalog 5 units ( dose to use at home) was given by pt to her r abdomen. Pt struggled with giving enough pressure using finger and not thumb. CDE completed dose. Pt practiced again with saline pens after real dose given. Questions answered.

## 2021-02-05 NOTE — DISCHARGE INSTRUCTIONS
Diabetes Basics    Diabetes (diabetes mellitus) is a long-term (chronic) disease. It occurs when the body does not properly use sugar (glucose) that is released from food after you eat.  Diabetes may be caused by one or both of these problems:  · Your pancreas does not make enough of a hormone called insulin.  · Your body does not react in a normal way to insulin that it makes.  Insulin lets sugars (glucose) go into cells in your body. This gives you energy. If you have diabetes, sugars cannot get into cells. This causes high blood sugar (hyperglycemia).  Follow these instructions at home:  How is diabetes treated?  You may need to take insulin or other diabetes medicines daily to keep your blood sugar in balance. Take your diabetes medicines every day as told by your doctor. List your diabetes medicines here:  Diabetes medicines  · Name of medicine: ______________________________  ? Amount (dose): _______________ Time (a.m./p.m.): _______________ Notes: ___________________________________  · Name of medicine: ______________________________  ? Amount (dose): _______________ Time (a.m./p.m.): _______________ Notes: ___________________________________  · Name of medicine: ______________________________  ? Amount (dose): _______________ Time (a.m./p.m.): _______________ Notes: ___________________________________  If you use insulin, you will learn how to give yourself insulin by injection. You may need to adjust the amount based on the food that you eat. List the types of insulin you use here:  Insulin  · Insulin type: ______________________________  ? Amount (dose): _______________ Time (a.m./p.m.): _______________ Notes: ___________________________________  · Insulin type: ______________________________  ? Amount (dose): _______________ Time (a.m./p.m.): _______________ Notes: ___________________________________  · Insulin type: ______________________________  ? Amount (dose): _______________ Time (a.m./p.m.):  _______________ Notes: ___________________________________  · Insulin type: ______________________________  ? Amount (dose): _______________ Time (a.m./p.m.): _______________ Notes: ___________________________________  · Insulin type: ______________________________  ? Amount (dose): _______________ Time (a.m./p.m.): _______________ Notes: ___________________________________  How do I manage my blood sugar?    Check your blood sugar levels using a blood glucose monitor as directed by your doctor.  Your doctor will set treatment goals for you. Generally, you should have these blood sugar levels:  · Before meals (preprandial):  mg/dL (4.4-7.2 mmol/L).  · After meals (postprandial): below 180 mg/dL (10 mmol/L).  · A1c level: less than 7%.  Write down the times that you will check your blood sugar levels:  Blood sugar checks  · Time: _______________ Notes: ___________________________________  · Time: _______________ Notes: ___________________________________  · Time: _______________ Notes: ___________________________________  · Time: _______________ Notes: ___________________________________  · Time: _______________ Notes: ___________________________________  · Time: _______________ Notes: ___________________________________    What do I need to know about low blood sugar?  Low blood sugar is called hypoglycemia. This is when blood sugar is at or below 70 mg/dL (3.9 mmol/L). Symptoms may include:  · Feeling:  ? Hungry.  ? Worried or nervous (anxious).  ? Sweaty and clammy.  ? Confused.  ? Dizzy.  ? Sleepy.  ? Sick to your stomach (nauseous).  · Having:  ? A fast heartbeat.  ? A headache.  ? A change in your vision.  ? Tingling or no feeling (numbness) around the mouth, lips, or tongue.  ? Jerky movements that you cannot control (seizure).  · Having trouble with:  ? Moving (coordination).  ? Sleeping.  ? Passing out (fainting).  ? Getting upset easily (irritability).  Treating low blood sugar  To treat low blood  sugar, eat or drink something sugary right away. If you can think clearly and swallow safely, follow the 15:15 rule:  · Take 15 grams of a fast-acting carb (carbohydrate). Talk with your doctor about how much you should take.  · Some fast-acting carbs are:  ? Sugar tablets (glucose pills). Take 3-4 glucose pills.  ? 6-8 pieces of hard candy.  ? 4-6 oz (120-150 mL) of fruit juice.  ? 4-6 oz (120-150 mL) of regular (not diet) soda.  ? 1 Tbsp (15 mL) honey or sugar.  · Check your blood sugar 15 minutes after you take the carb.  · If your blood sugar is still at or below 70 mg/dL (3.9 mmol/L), take 15 grams of a carb again.  · If your blood sugar does not go above 70 mg/dL (3.9 mmol/L) after 3 tries, get help right away.  · After your blood sugar goes back to normal, eat a meal or a snack within 1 hour.  Treating very low blood sugar  If your blood sugar is at or below 54 mg/dL (3 mmol/L), you have very low blood sugar (severe hypoglycemia). This is an emergency. Do not wait to see if the symptoms will go away. Get medical help right away. Call your local emergency services (911 in the [place] Do not drive yourself to the hospital.  Questions to ask your health care provider  · Do I need to meet with a diabetes educator?  · What equipment will I need to care for myself at home?  · What diabetes medicines do I need? When should I take them?  · How often do I need to check my blood sugar?  · What number can I call if I have questions?  · When is my next doctor's visit?  · Where can I find a support group for people with diabetes?  Where to find more information  · American Diabetes Association: www.diabetes.org  · American Association of Diabetes Educators: www.diabeteseducator.org/patient-resources  Contact a doctor if:  · Your blood sugar is at or above 240 mg/dL (13.3 mmol/L) for 2 days in a row.  · You have been sick or have had a fever for 2 days or more, and you are not getting better.  · You have any of these  problems for more than 6 hours:  ? You cannot eat or drink.  ? You feel sick to your stomach (nauseous).  ? You throw up (vomit).  ? You have watery poop (diarrhea).  Get help right away if:  · Your blood sugar is lower than 54 mg/dL (3 mmol/L).  · You get confused.  · You have trouble:  ? Thinking clearly.  ? Breathing.  Summary  · Diabetes (diabetes mellitus) is a long-term (chronic) disease. It occurs when the body does not properly use sugar (glucose) that is released from food after digestion.  · Take insulin and diabetes medicines as told.  · Check your blood sugar every day, as often as told.  · Keep all follow-up visits as told by your doctor. This is important.  This information is not intended to replace advice given to you by your health care provider. Make sure you discuss any questions you have with your health care provider.  Document Released: 03/22/2019 Document Revised: 02/07/2020 Document Reviewed: 03/22/2019  [employer] Patient Education © 2020 [employer]  Craniotomy, Care After  This sheet gives you information about how to care for yourself after your procedure. Your doctor may also give you more specific instructions. If you have problems or questions, contact your doctor.  Follow these instructions at home:  Wound care    · Follow instructions from your doctor about how to take care of your cut from surgery (incision) and pin insertion areas. Make sure you:  ? Wash your hands with soap and water before you change your bandage (dressing). If you cannot use soap and water, use hand .  ? Change your bandage as told by your doctor.  ? Leave stitches (sutures), skin glue, or skin tape (adhesive) strips in place. They may need to stay in place for 2 weeks or longer. If tape strips get loose and curl up, you may trim the loose edges. Do not remove tape strips completely unless your doctor says it is okay.  · Check your cut area and pin insertion sites every day for signs of infection. Check  for:  ? Redness, swelling, or pain.  ? Fluid or blood.  ? Warmth.  ? Pus or a bad smell.  · Do not take baths, swim, or use a hot tub until your doctor says it is okay.  Activity  · Limit your activities as told by your doctor. You may then slowly increase your activities as told by your doctor. Avoid contact sports for 1 year or as told by your doctor.  · Do not drive until your doctor says it is okay.  · Rest and sleep as much as possible. When you lie down, keep your head raised (elevated) at a 30-degree angle. You can do this with some pillows. This helps keep brain swelling down and prevents increased pressure in the head. Ask your doctor when you can go back to sleeping flat.  · Ask your doctor when you can go back to work.  General instructions    · Wear a helmet as told by your doctor.  · Do not drink alcohol.  · Take over-the-counter and prescription medicines only as told by your doctor.  · To prevent or treat constipation while you are taking prescription pain medicine, your doctor may suggest that you:  ? Drink enough fluid to keep your pee (urine) clear or pale yellow.  ? Take over-the-counter or prescription medicines.  ? Eat foods that are high in fiber. Such foods include:  § Fresh fruits.  § Fresh vegetables.  § Whole grains.  § Beans.  ? Limit foods that are high in fat and processed sugars, such as fried and sweet foods.  Contact a doctor if:  · You have redness, swelling, or pain around your cut area or pin insertion areas.  · You have fluid or blood coming from your cut area or pin insertion areas.  · Your cut area or pin insertion areas feel warm to the touch.  · You have pus or a bad smell coming from your cut area or pin insertion areas.  · You have a fever.  Get help right away if:  · You have a very bad headache.  · You have uncontrolled shaking or jerking movements (seizures).  · You feel confused.  · You pass out (faint).  · You feel sick to your stomach (nausea) and you throw up (vomit)  and it does not stop.  · You feel weak or numb on one side of your body.  · Your speech is slurred.  · You have chest pain, a stiff neck, or trouble breathing.  · You have blurry vision or loss of vision.  · You have swelling or bruising around the eyes.  · Your wound breaks open after the stitches or staples are taken out.  · You have a rash.  · You feel dizzy.  Summary  · Check your cut area and pin insertion areas every day for signs of infection. Signs of infection include redness, swelling, drainage, or fever.  · After the procedure, return to activities slowly. Rest when you get tired.  · Eat foods that are high in fiber, such as fresh fruits and vegetables, whole grains, and beans. Drink plenty of fluids to avoid constipation after surgery, especially if you are taking pain medicines.  This information is not intended to replace advice given to you by your health care provider. Make sure you discuss any questions you have with your health care provider.  Document Released: 12/23/2014 Document Revised: 11/30/2018 Document Reviewed: 01/05/2018  [employer] Patient Education © 2020 [employer]  Ok to shower, pat incision dry, leave open to air- no dressing   No Aspirin or non-steroidal anti-inflammatory medications (aleve, motrin, ibuprofen, celebrex)  No driving  Over the counter stool softeners daily while on narcotics  No lifting   Follow up at [facility] 2 weeks after surgery

## 2021-02-05 NOTE — PROGRESS NOTES
"    Physical Medicine and Rehabilitation Consultation              Date of initial consultation: 2/3/2021  Consulting provider: Micha Maddox MD  Reason for consultation: assess for acute inpatient rehab appropriateness  LOS: 13 Day(s)    Chief complaint: Meningioma    HPI: The patient is a 45 y.o. right hand dominant female with a past medical history of migraines;  who presented on 1/21/2021  9:04 PM with headache found to have meningioma on CT.  Underwent craniotomy for resection on 1/29/2021.  Postop course complicated by hypoxia and confusion with concern for subclinical seizures.  EEG did not show seizure activity, but since she had not returned to baseline she was started on Keppra and Vimpat.     The patient currently reports feeling good.  She is avoiding looking at her incision because \"out of sight out of mind\".  States she is trying to keep a good 'personality' about her recent surgery.  Endorses some occasional headache.    2/4/2021  Patient plan to discharge home today. States she is doing well, although slightly sleepy. Discussed outpatient follow-up with PMR. Patient has no concerns going home. Notes reflect she is being set up with [facility] .    Social Hx:  1 SH  0 ALEX  With: Alone    Notes reflect patient will have a friend staying with her at discharge who can assist as needed    Employment: Was working at a [employer] company  Tobacco: 5.5 pack year history  Alcohol: Occasional  Drugs: Denies    THERAPY:  PT: Functional mobility   2/1: Min assist sit to stand and transfer, walking 180 feet at min assist no device    OT: ADLs  2/1: Min assist lower body dressing    SLP:   2/1: Regular diet, thin liquids    IMAGING:  MR brain with and without 1/30/2021  1.  Status post resection of LEFT parietal extra-axial mass  2.  Small amount of blood in the surgical resection bed without significant mass effect  3.  Changes in the high LEFT parietal lobe adjacent to the surgical resection bed could be " related to ischemia or recent seizure activity  4.  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 malformations as previously noted    PROCEDURES:  Luke Soto MD left craniotomy 1/29    PMH:  Past Medical History:   Diagnosis Date   • Migraine        PSH:  Past Surgical History:   Procedure Laterality Date   • CRANIOTOMY STEALTH Left 1/29/2021    Procedure: CRANIOTOMY, USING FRAMELESS STEREOTAXY-FOR TUMOR;  Surgeon: Luke Soto M.D.;  Location: SURGERY [facility];  Service: Neurosurgery   • CHOLECYSTECTOMY     • OTHER ORTHOPEDIC SURGERY      L meniscus and ACL repair       FHX:  Family History   Problem Relation Age of Onset   • Arterial Aneurysm Father        Medications:  Current Facility-Administered Medications   Medication Dose   • insulin regular (HumuLIN R,NovoLIN R) injection  5 Units   • insulin glargine (Lantus) injection  28 Units   • acetaminophen (Tylenol) tablet 650 mg  650 mg   • dexamethasone (DECADRON) tablet 8 mg  8 mg    Followed by   • [START ON 2/5/2021] dexamethasone (DECADRON) tablet 8 mg  8 mg    Followed by   • [START ON 2/7/2021] dexamethasone (DECADRON) tablet 6 mg  6 mg    Followed by   • [START ON 2/9/2021] dexamethasone (DECADRON) tablet 4 mg  4 mg    Followed by   • [START ON 2/11/2021] dexamethasone (DECADRON) tablet 2 mg  2 mg    Followed by   • [START ON 2/13/2021] dexamethasone (DECADRON) tablet 1 mg  1 mg   • lacosamide (VIMPAT) tablet 100 mg  100 mg   • levETIRAcetam (KEPPRA) tablet 1,000 mg  1,000 mg   • metFORMIN (GLUCOPHAGE) tablet 500 mg  500 mg   • fludrocortisone (FLORINEF) tablet 0.1 mg  0.1 mg   • insulin regular (HumuLIN R,NovoLIN R) injection  3-14 Units    And   • glucose 4 g chewable tablet 16 g  16 g    And   • dextrose 50% (D50W) injection 50 mL  50 mL   • ondansetron (ZOFRAN ODT) dispertab 4 mg  4 mg   • sodium chloride (SALT) tablet 1 g  1 g   • senna-docusate (PERICOLACE or SENOKOT S) 8.6-50 MG per tablet 2 Tab  2  "Tab    And   • polyethylene glycol/lytes (MIRALAX) PACKET 1 Packet  1 Packet    And   • magnesium hydroxide (MILK OF MAGNESIA) suspension 30 mL  30 mL    And   • bisacodyl (DULCOLAX) suppository 10 mg  10 mg   • cloNIDine (CATAPRES) tablet 0.1 mg  0.1 mg   • oxyCODONE immediate release (ROXICODONE) tablet 10 mg  10 mg   • oxyCODONE immediate-release (ROXICODONE) tablet 5 mg  5 mg   • promethazine (PHENERGAN) tablet 12.5-25 mg  12.5-25 mg   • Pharmacy Consult Request ...Pain Management Review 1 Each  1 Each   • MD ALERT...DO NOT ADMINISTER NSAIDS or ASPIRIN unless ORDERED By Neurosurgery 1 Each  1 Each   • dexamethasone (DECADRON) injection 4 mg  4 mg   • diphenhydrAMINE (BENADRYL) injection 25 mg  25 mg   • scopolamine (TRANSDERM-SCOP) patch 1 Patch  1 Patch   • labetalol (NORMODYNE/TRANDATE) injection 10 mg  10 mg   • hydrALAZINE (APRESOLINE) injection 10 mg  10 mg   • morphine (pf) 4 mg/mL injection 4 mg  4 mg   • ondansetron (ZOFRAN) syringe/vial injection 4 mg  4 mg   • LORazepam (ATIVAN) injection 2 mg  2 mg   • promethazine (PHENERGAN) suppository 12.5-25 mg  12.5-25 mg   • prochlorperazine (COMPAZINE) injection 5-10 mg  5-10 mg       Allergies:  Allergies   Allergen Reactions   • Amoxicillin Rash   • Food      bananas   • Potassium      Itchy and swollen lips       Physical Exam:  Vitals: BP (!) 97/62   Pulse 64   Temp 36.9 °C (98.5 °F) (Temporal)   Resp 12   Ht 1.676 m (5' 5.98\")   Wt 95.3 kg (210 lb 1.6 oz)   SpO2 97%   Gen: NAD  Head: Large incision on apex of head and extending over left side  Eyes/ Nose/ Mouth: PERRLA, moist mucous membranes  Cardio: RRR, good distal perfusion, warm extremities  Pulm: normal respiratory effort, no cyanosis   Abd: Soft NTND, negative borborygmi   Ext: No peripheral edema. No calf tenderness. No clubbing.    Mental status: answers questions appropriately follows commands  Speech: fluent, no aphasia or dysarthria    CRANIAL NERVES:  2,3: visual acuity grossly intact, " PERRL  3,4,6: EOMI bilaterally, no nystagmus or diplopia  5: sensation intact to light touch bilaterally and symmetric  7: no facial asymmetry  8: hearing grossly intact  9,10: symmetric palate elevation  11: SCM/Trapezius strength 5/5 bilaterally  12: tongue protrudes midline      Motor:      Upper Extremity  Myotome R L   Shoulder flexion C5 5 5   Elbow flexion C5 5 5   Wrist extension C6 5 5   Elbow extension C7 5 5   Finger flexion C8 5 5   Finger abduction T1 5 5     Lower Extremity Myotome R L   Hip flexion L2 5 5   Knee extension L3 5 5   Ankle dorsiflexion L4 5 5   Toe extension L5 5 5   Ankle plantarflexion S1 5 5       Sensory:   intact to light touch through out    DTRs:  Right  Left    Brachioradialis  2+  2+   Patella tendon  2+ 2+     Labs: Reviewed and significant for   No results for input(s): RBC, HEMOGLOBIN, HEMATOCRIT, PLATELETCT, PROTHROMBTM, APTT, INR, IRON, FERRITIN, TOTIRONBC in the last 72 hours.  Recent Labs     02/02/21  0423 02/03/21  0410   SODIUM 132* 134*   POTASSIUM 4.3 3.9   CHLORIDE 98 99   CO2 26 23   GLUCOSE 218* 179*   BUN 16 17   CREATININE 0.44* 0.54   CALCIUM 8.4* 8.9     Recent Results (from the past 24 hour(s))   ACCU-CHEK GLUCOSE    Collection Time: 02/03/21  5:50 PM   Result Value Ref Range    Glucose - Accu-Ck 181 (H) 65 - 99 mg/dL   ACCU-CHEK GLUCOSE    Collection Time: 02/03/21  9:10 PM   Result Value Ref Range    Glucose - Accu-Ck 189 (H) 65 - 99 mg/dL   ACCU-CHEK GLUCOSE    Collection Time: 02/04/21  8:48 AM   Result Value Ref Range    Glucose - Accu-Ck 204 (H) 65 - 99 mg/dL   ACCU-CHEK GLUCOSE    Collection Time: 02/04/21 12:48 PM   Result Value Ref Range    Glucose - Accu-Ck 200 (H) 65 - 99 mg/dL         ASSESSMENT:  Patient is a 45 y.o. female admitted with meningioma now s/p resection with left craniotomy    [facility] Code / Diagnosis to Support: 0002.1 - Brain Dysfunction: Non-Traumatic    Rehabilitation: Impaired ADLs and mobility  Patient unlikely to qualify for IPR,  currently walking 180 feet at min assist and is supervision with ADLs except for lower body dressing which requires min assist.    Additional Recommendations:  -  Going home with HH  - Outpatient PMR order placed   -Continue PT OT while in-house  -On steroid taper  -Seizure control with Vimpat and Keppra  -Pain control with Roxicodone 5 to 10 mg  -On salt tabs for hyponatremia  - Tobacco abuse cessation counseling given on initial consult for ~5 min as it pertains to vascular disease, heart attack, stroke, wound healing, and pulmonary disease.   - PMR to sign off     Medical Complexity:  Hyponatremia      DVT PPX: SCDs      Thank you for allowing us to participate in the care of this patient.     Patient was seen for 28 minutes on unit/floor of which > 50% of time was spent on counseling and coordination of care regarding the above, including prognosis, risk reduction, benefits of treatment, and options for next stage of care.    Mat Avila, DO   Physical Medicine and Rehabilitation

## 2021-02-12 ENCOUNTER — HOSPITAL ENCOUNTER (OUTPATIENT)
Facility: MEDICAL CENTER | Age: 46
End: 2021-02-12
Attending: NURSE PRACTITIONER
Payer: COMMERCIAL

## 2021-02-12 LAB
ANION GAP SERPL CALC-SCNC: 13 MMOL/L (ref 7–16)
BUN SERPL-MCNC: 13 MG/DL (ref 8–22)
CALCIUM SERPL-MCNC: 8.9 MG/DL (ref 8.5–10.5)
CHLORIDE SERPL-SCNC: 98 MMOL/L (ref 96–112)
CO2 SERPL-SCNC: 24 MMOL/L (ref 20–33)
CREAT SERPL-MCNC: 0.51 MG/DL (ref 0.5–1.4)
FASTING STATUS PATIENT QL REPORTED: NORMAL
GLUCOSE SERPL-MCNC: 238 MG/DL (ref 65–99)
POTASSIUM SERPL-SCNC: 4.2 MMOL/L (ref 3.6–5.5)
SODIUM SERPL-SCNC: 135 MMOL/L (ref 135–145)

## 2021-02-12 PROCEDURE — 80048 BASIC METABOLIC PNL TOTAL CA: CPT

## 2021-03-01 ENCOUNTER — TELEPHONE (OUTPATIENT)
Dept: MEDICAL GROUP | Facility: IMAGING CENTER | Age: 46
End: 2021-03-01

## 2021-03-01 ENCOUNTER — OFFICE VISIT (OUTPATIENT)
Dept: MEDICAL GROUP | Facility: IMAGING CENTER | Age: 46
End: 2021-03-01
Payer: COMMERCIAL

## 2021-03-01 VITALS
RESPIRATION RATE: 18 BRPM | WEIGHT: 214 LBS | BODY MASS INDEX: 34.39 KG/M2 | TEMPERATURE: 98.4 F | SYSTOLIC BLOOD PRESSURE: 106 MMHG | HEART RATE: 115 BPM | DIASTOLIC BLOOD PRESSURE: 76 MMHG | HEIGHT: 66 IN | OXYGEN SATURATION: 95 %

## 2021-03-01 DIAGNOSIS — R56.9 SEIZURE (HCC): ICD-10-CM

## 2021-03-01 DIAGNOSIS — Z79.4 TYPE 2 DIABETES MELLITUS WITHOUT COMPLICATION, WITH LONG-TERM CURRENT USE OF INSULIN (HCC): ICD-10-CM

## 2021-03-01 DIAGNOSIS — E87.1 HYPONATREMIA: ICD-10-CM

## 2021-03-01 DIAGNOSIS — E11.9 TYPE 2 DIABETES MELLITUS WITHOUT COMPLICATION, WITH LONG-TERM CURRENT USE OF INSULIN (HCC): ICD-10-CM

## 2021-03-01 DIAGNOSIS — D32.9 MENINGIOMA (HCC): ICD-10-CM

## 2021-03-01 DIAGNOSIS — Z76.0 MEDICATION REFILL: ICD-10-CM

## 2021-03-01 DIAGNOSIS — Z76.89 ENCOUNTER TO ESTABLISH CARE: ICD-10-CM

## 2021-03-01 PROBLEM — Z72.0 TOBACCO USE: Status: ACTIVE | Noted: 2021-03-01

## 2021-03-01 PROCEDURE — 99215 OFFICE O/P EST HI 40 MIN: CPT | Performed by: PHYSICIAN ASSISTANT

## 2021-03-01 RX ORDER — INSULIN LISPRO 100 [IU]/ML
5 INJECTION, SOLUTION INTRAVENOUS; SUBCUTANEOUS
Qty: 9 ML | Refills: 3 | Status: SHIPPED | OUTPATIENT
Start: 2021-03-01 | End: 2021-05-04

## 2021-03-01 RX ORDER — INSULIN GLARGINE 100 [IU]/ML
28 INJECTION, SOLUTION SUBCUTANEOUS EVERY EVENING
Qty: 9 ML | Refills: 3 | Status: SHIPPED | OUTPATIENT
Start: 2021-03-01 | End: 2021-04-15 | Stop reason: SDUPTHER

## 2021-03-01 ASSESSMENT — FIBROSIS 4 INDEX: FIB4 SCORE: 0.37

## 2021-03-01 ASSESSMENT — PAIN SCALES - GENERAL: PAINLEVEL: 2=MINIMAL-SLIGHT

## 2021-03-01 NOTE — ASSESSMENT & PLAN NOTE
S/P resection of L parietal meningioma by Dr. Soto 1/29/21. Saw Dr. Soto two weeks ago and will follow up again with him 3/17/21. Doing PT twice a week.

## 2021-03-01 NOTE — PROGRESS NOTES
"Subjective:     CC:    Chief Complaint   Patient presents with   • Medication Management     insulin       HISTORY OF THE PRESENT ILLNESS: Patient is a 45 y.o. female here for a hospital follow up and to establish care. Pt originally went to the Urgent care in 1/21 due to \"worst headache of my life\". She was transferred to the ER and was found to have a left parietal meningioma and underwent resection on 1/29/21. She was found to be diabetic while in the hospital and she also had mild hyponatremia. She did have a seizure while in the hospital and is on keppra and vimpat.    Meningioma (HCC)  S/P resection of L parietal meningioma by Dr. Soto 1/29/21. Saw Dr. Soto two weeks ago and will follow up again with him 3/17/21. Doing PT twice a week.    Seizure (HCC)  On Keppra and Vimpat. Saw the neurologist last week. Has an EEG scheduled and will stay on Keppra and Vimpat until then. Future removal of Vimpat pending EEG. Currently not driving.     Diabetes mellitus (HCC)  A1C 9.7, newly diagnosed while in the hospital. Recently on Decadron taper. On Lantus, Humalog, Metformin. Will be making an appt with ophthalmology soon. Glucose between 130-250, but lately has been in the 100s. No hypoglycemia.     Hyponatremia  Likely SIADH, now off salt tabs      Allergies:   Amoxicillin, Food, and Potassium    Current Outpatient Medications Ordered in Epic   Medication Sig Dispense Refill   • insulin glargine (LANTUS SOLOSTAR) 100 UNIT/ML Solution Pen-injector injection Inject 28 Units under the skin every evening. 9 mL 3   • insulin lispro (HUMALOG) 100 UNIT/ML Solution Pen-injector injection PEN Inject 5 Units under the skin 3 times a day before meals. 9 mL 3   • Insulin Pen Needle 32 G x 4 mm Use as directed with insulin pen 100 Each 1   • metFORMIN (GLUCOPHAGE) 500 MG Tab Take 1 tablet by mouth 2 times a day, with meals. 120 tablet 1   • lacosamide (VIMPAT) 100 MG Tab tablet Take 1 Tab by mouth 2 Times a Day for 30 days. 60 " Tab 0   • levetiracetam (KEPPRA) 1000 MG tablet Take 1 Tab by mouth 2 Times a Day. 60 Tab 0   • Blood Glucose Monitoring Suppl (BLOOD GLUCOSE MONITOR SYSTEM) w/Device Kit Test blood sugar as recommended by provider 1 Kit 0   • glucose blood strip Use one strip to test blood sugar three times daily 100 Strip 0   • Lancets Use one One Touch Verio Flex lancet to test blood sugar three times daily. 100 Each 0   • acetaminophen (TYLENOL) 500 MG Tab Take 1,000 mg by mouth 2 times a day as needed for Moderate Pain.     • Ascorbic Acid (VITAMIN C) 1000 MG Tab Take 1 Tab by mouth every day.     • Multiple Vitamins-Minerals (OCUVITE PO) Take 1 Tab by mouth every day.       No current [facility]-ordered facility-administered medications on file.       Past Medical History:   Diagnosis Date   • Migraine        Past Surgical History:   Procedure Laterality Date   • CRANIOTOMY STEALTH Left 1/29/2021    Procedure: CRANIOTOMY, USING FRAMELESS STEREOTAXY-FOR TUMOR;  Surgeon: Luke Soto M.D.;  Location: SURGERY [facility];  Service: Neurosurgery   • CHOLECYSTECTOMY     • OTHER ORTHOPEDIC SURGERY      L meniscus and ACL repair       Social History     Tobacco Use   • Smoking status: Former Smoker     Packs/day: 0.25     Years: 22.00     Pack years: 5.50     Types: Cigarettes     Quit date: 10/8/2010     Years since quitting: 10.4   • Smokeless tobacco: Never Used   • Tobacco comment: quit last hospital visit   Substance Use Topics   • Alcohol use: Not Currently   • Drug use: No       Social History     Social History Narrative   • Not on file       Family History   Problem Relation Age of Onset   • Arterial Aneurysm Father    • Cancer Maternal Grandmother         breast cancer        ROS:   Gen: no fevers/chills, no changes in weight  Eyes: no changes in vision  ENT: no sore throat, no hearing loss, no bloody nose  Pulm: no sob, no cough  CV: no chest pain, no palpitations  GI: no nausea/vomiting, no diarrhea  : no dysuria or  "hematuria  MSk: no myalgias  Skin: no rash  Neuro: no headaches, no numbness/tingling  Heme/Lymph: no easy bruising      Objective:     Exam: /76 (BP Location: Left arm, Patient Position: Sitting, BP Cuff Size: Adult)   Pulse (!) 115   Temp 36.9 °C (98.4 °F) (Temporal)   Resp 18   Ht 1.676 m (5' 6\")   Wt 97.1 kg (214 lb)   SpO2 95%  Body mass index is 34.54 kg/m².    General: Normal appearing. No distress.  HEENT: Normocephalic. Eyes conjunctiva clear lids without ptosis, pupils equal and reactive to light accommodation, ears normal shape and contour, canals are clear bilaterally, tympanic membranes are benign, nasal mucosa benign, oropharynx is without erythema, edema or exudates.   Neck: Supple without JVD or bruit. Thyroid is not enlarged.  Pulmonary: Clear to ausculation.  Normal effort. No rales, ronchi, or wheezing.  Cardiovascular: Regular rate and rhythm without murmur. Carotid and radial pulses are intact and equal bilaterally.  Abdomen: Soft, nontender, nondistended. Normal bowel sounds. Liver and spleen are not palpable  Neurologic: Grossly nonfocal  Lymph: No cervical or supraclavicular lymph nodes are palpable  Skin: Warm and dry.  No obvious lesions.  Scalp incision healing well without signs of infection.  Musculoskeletal: Normal gait. No extremity cyanosis, clubbing, or edema.  Psych: Normal mood and affect. Alert and oriented x3. Judgment and insight is normal.       Assessment & Plan:   45 y.o. female with the following -    1. Encounter to establish care  Pleasant 45-year-old female here to establish care.  Patient status post recent hospitalization for a left parietal meningioma resection.  She is no longer on Decadron or salt tabs.  Follow-up as discussed with neurology and neurosurgery, advised patient to have them send me a note and any testing that is ordered.    2. Meningioma (HCC)  Status post resection by Dr. Soto.  Currently on Vimpat and Keppra.    3. Hyponatremia  Now off " salt tabs.  Likely SIADH status post recent brain surgery.  We will recheck today.  - Comp Metabolic Panel; Future    4. Type 2 diabetes mellitus without complication, with long-term current use of insulin (HCC)  Newly diagnosed, suboptimally controlled.  Last A1c 9.7.  Patient most recently on Decadron.  Patient currently on 30 units of Lantus every evening and states that she wakes up and feels like she needs a snack after an hour or 2.  We will decrease to 28 units every evening.  She also takes 5 units of Humalog 3 times a day before meals.  Discussed with patient how to log her glucose readings and she will send me them on MyChart weekly until she is controlled.  - CBC WITH DIFFERENTIAL; Future  - insulin glargine (LANTUS SOLOSTAR) 100 UNIT/ML Solution Pen-injector injection; Inject 28 Units under the skin every evening.  Dispense: 9 mL; Refill: 3  - insulin lispro (HUMALOG) 100 UNIT/ML Solution Pen-injector injection PEN; Inject 5 Units under the skin 3 times a day before meals.  Dispense: 9 mL; Refill: 3  - Insulin Pen Needle 32 G x 4 mm; Use as directed with insulin pen  Dispense: 100 Each; Refill: 1  - metFORMIN (GLUCOPHAGE) 500 MG Tab; Take 1 tablet by mouth 2 times a day, with meals.  Dispense: 120 tablet; Refill: 1    5. Seizure (HCC)  Follow-up with neurology as scheduled, EEG has been ordered by neurology.  Currently on Vimpat and Keppra.  Currently not driving.  Friend Rani is present at bedside.      Return in about 2 months (around 5/1/2021) for Annual physical and pap.  Will discuss vaccinations and mammogram at her physical in 2 months.    Barbara Mcginnis PA-C    Please note that this dictation was created using voice recognition software. I have made every reasonable attempt to correct obvious errors, but I expect that there are errors of grammar and possibly content that I did not discover before finalizing the note.

## 2021-03-01 NOTE — TELEPHONE ENCOUNTER
Hillary RN at [facility], calling in regarding visit with patient today. She states she just wanted Barbara to be aware patients heart rate was still elevated today. She states it was around 106-110. She states she saw on her paperwork from her visit today that is was high in our office as well, 115. She states she will continue to monitor for now. All other vitals were normal.

## 2021-03-01 NOTE — PATIENT INSTRUCTIONS
Diabetes recommendations:  -Follow an 1800 calorie ADA diet and aim to get about half of your calories from carbohydrates. For an 1800 calorie diet, that would be around 150-200 grams of carbs a day. Exercise as tolerated; ideally 150 minutes of cardiovascular exercise a week, or 20 minutes a day.   -Monitor blood sugars at home and keep a log book. Check your glucose fasting every morning, before dinner, and 2 hours after your biggest meal (or as otherwise recommended at your appointment). Call if glucose <70 or >300. Please send weekly readings of your blood glucose through [facility].  -Please check your feet frequently for any open wounds/ulcers, signs of infection, or changes in sensation/neuropathy.  -Ensure that you see your ophthalmologist for your annual eye exam to assess for diabetic retinopathy.

## 2021-03-01 NOTE — ASSESSMENT & PLAN NOTE
A1C 9.7, newly diagnosed while in the hospital. Recently on Decadron taper. On Lantus, Humalog, Metformin. Will be making an appt with ophthalmology soon. Glucose between 130-250, but lately has been in the 100s. No hypoglycemia.

## 2021-03-01 NOTE — ASSESSMENT & PLAN NOTE
On Keppra and Vimpat. Saw the neurologist last week. Has an EEG scheduled and will stay on Keppra and Vimpat until then. Future removal of Vimpat pending EEG. Currently not driving.

## 2021-03-04 ENCOUNTER — TELEPHONE (OUTPATIENT)
Dept: MEDICAL GROUP | Facility: IMAGING CENTER | Age: 46
End: 2021-03-04

## 2021-03-04 NOTE — TELEPHONE ENCOUNTER
Spoke with Dr. Trent. Due to glucose levels being so low after eating, she recommends patient skip fast acting medication before dinner. Advise patient still take her metformin with dinner. Notify symptoms patient should look out for due to lower blood sugar.    Patient's friend, Rani, notified of recommendations due to patient being currently unavailable. She will pass along the information and have patient give us a call back if needed.

## 2021-03-05 ENCOUNTER — OFFICE VISIT (OUTPATIENT)
Dept: PHYSICAL MEDICINE AND REHAB | Facility: REHABILITATION | Age: 46
End: 2021-03-05
Payer: COMMERCIAL

## 2021-03-05 ENCOUNTER — TELEPHONE (OUTPATIENT)
Dept: MEDICAL GROUP | Facility: IMAGING CENTER | Age: 46
End: 2021-03-05

## 2021-03-05 VITALS
HEART RATE: 104 BPM | BODY MASS INDEX: 34.55 KG/M2 | RESPIRATION RATE: 18 BRPM | HEIGHT: 66 IN | TEMPERATURE: 97.6 F | SYSTOLIC BLOOD PRESSURE: 110 MMHG | WEIGHT: 215 LBS | DIASTOLIC BLOOD PRESSURE: 60 MMHG

## 2021-03-05 DIAGNOSIS — R47.01 EXPRESSIVE APHASIA: ICD-10-CM

## 2021-03-05 DIAGNOSIS — R56.9 SEIZURE (HCC): ICD-10-CM

## 2021-03-05 DIAGNOSIS — D32.9 MENINGIOMA (HCC): Primary | ICD-10-CM

## 2021-03-05 DIAGNOSIS — M79.2 NEUROPATHIC PAIN: ICD-10-CM

## 2021-03-05 PROCEDURE — 99214 OFFICE O/P EST MOD 30 MIN: CPT | Performed by: PHYSICAL MEDICINE & REHABILITATION

## 2021-03-05 ASSESSMENT — ENCOUNTER SYMPTOMS
PALPITATIONS: 0
SORE THROAT: 0
FALLS: 0
SHORTNESS OF BREATH: 0
MEMORY LOSS: 1
DIARRHEA: 0
CHILLS: 0
BRUISES/BLEEDS EASILY: 0
SPEECH CHANGE: 1
CONSTIPATION: 0
COUGH: 0
FEVER: 0

## 2021-03-05 ASSESSMENT — FIBROSIS 4 INDEX: FIB4 SCORE: 0.37

## 2021-03-05 NOTE — PROGRESS NOTES
[facility]  [facility]  [address] [place] [address]  Ph: [phone]    NEW PATIENT EVALUATION    *Patient established in PM&R practice, however, patient new to writer as patient is transferring care. Therefore, patient billed as established.     Patient Name: Marni Muñoz   Patient : 1975  Patient Age: 45 y.o.     Examining Physician: Dr. Jaclyn Shepard, DO    PM&R History to Date - Background Information:  Dates of Admission/Discharge to ARU: None    SUBJECTIVE:   Patient Identification: Marni Muñoz is a 45 y.o. female with PMH significant for migraines and rehabilitation history significant for meningioma s/p craniotomy for resection 2021 and is presenting to PM&R clinic for a NEW OUTPATIENT evaluation with the following chief complaint/s:    Chief Complaint: Memory issues.     Accompanied by Today: Friend  History of Present Illness:    -Records reviewed: Rehab consult note reviewed in its entirety.  -Discharged with home health from acute.  - Working with therapy with  2x weekly.   - Is trying to do more and more at home.   - Prior level of function: Completely independent.   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   - Has trouble reading and understanding what she is reading. Still has short term memory issues.   - D/c from hospital 21.   - Driving Status: Not driving.   - No falls.   - Will see NSGY later this month.   - Will see neurology the same day.   - Will see PCP in May.   - Would like to go back to work.   - Typing and spelling is difficult.  - Sometimes get tingling in her toes, but only when she is sitting cross legged.   - Has HA's treated well with Tylenol and has not gone above 3/10.    - Voiding volitionally  - No constipation/diarrhea.   - Swallowing well. No coughing/choking.   - Sleeping well with Tylenol PM.   - Tremulous with fatigue and stress.     Review of Systems:  Review of Systems   Constitutional: Negative for  chills and fever.   HENT: Negative for congestion and sore throat.    Respiratory: Negative for cough and shortness of breath.    Cardiovascular: Negative for palpitations and leg swelling.   Gastrointestinal: Negative for constipation and diarrhea.   Genitourinary: Negative for dysuria, frequency and urgency.   Musculoskeletal: Negative for falls and joint pain.   Neurological: Positive for speech change.   Endo/Heme/Allergies: Does not bruise/bleed easily.   Psychiatric/Behavioral: Positive for memory loss ( short term memory loss.).        Cognitive deficits.       All other pertinent positive review of systems are noted above in HPI.   All other systems reviewed and are negative.    Past Medical History:  Past Medical History:   Diagnosis Date   • Migraine       Past Surgical History:   Procedure Laterality Date   • CRANIOTOMY STEALTH Left 1/29/2021    Procedure: CRANIOTOMY, USING FRAMELESS STEREOTAXY-FOR TUMOR;  Surgeon: Luke Soto M.D.;  Location: SURGERY [facility];  Service: Neurosurgery   • CHOLECYSTECTOMY     • OTHER ORTHOPEDIC SURGERY      L meniscus and ACL repair        Current Outpatient Medications:   •  insulin glargine (LANTUS SOLOSTAR) 100 UNIT/ML Solution Pen-injector injection, Inject 28 Units under the skin every evening., Disp: 9 mL, Rfl: 3  •  insulin lispro (HUMALOG) 100 UNIT/ML Solution Pen-injector injection PEN, Inject 5 Units under the skin 3 times a day before meals., Disp: 9 mL, Rfl: 3  •  Insulin Pen Needle 32 G x 4 mm, Use as directed with insulin pen, Disp: 100 Each, Rfl: 1  •  metFORMIN (GLUCOPHAGE) 500 MG Tab, Take 1 tablet by mouth 2 times a day, with meals., Disp: 120 tablet, Rfl: 1  •  lacosamide (VIMPAT) 100 MG Tab tablet, Take 1 Tab by mouth 2 Times a Day for 30 days., Disp: 60 Tab, Rfl: 0  •  levetiracetam (KEPPRA) 1000 MG tablet, Take 1 Tab by mouth 2 Times a Day., Disp: 60 Tab, Rfl: 0  •  Blood Glucose Monitoring Suppl (BLOOD GLUCOSE MONITOR SYSTEM) w/Device  Kit, Test blood sugar as recommended by provider, Disp: 1 Kit, Rfl: 0  •  glucose blood strip, Use one strip to test blood sugar three times daily, Disp: 100 Strip, Rfl: 0  •  Lancets, Use one One Touch Verio Flex lancet to test blood sugar three times daily., Disp: 100 Each, Rfl: 0  •  acetaminophen (TYLENOL) 500 MG Tab, Take 1,000 mg by mouth 2 times a day as needed for Moderate Pain., Disp: , Rfl:   •  Multiple Vitamins-Minerals (OCUVITE PO), Take 1 Tab by mouth every day., Disp: , Rfl:   •  Ascorbic Acid (VITAMIN C) 1000 MG Tab, Take 1 Tab by mouth every day., Disp: , Rfl:   Allergies   Allergen Reactions   • Amoxicillin Rash   • Food Vomiting     Bananas and marijuana   • Potassium      Itchy and swollen lips        Past Social History:  Social History     Socioeconomic History   • Marital status: Single     Spouse name: Not on file   • Number of children: Not on file   • Years of education: Not on file   • Highest education level: Not on file   Occupational History   • Not on file   Tobacco Use   • Smoking status: Former Smoker     Packs/day: 0.25     Years: 22.00     Pack years: 5.50     Types: Cigarettes     Quit date: 10/8/2010     Years since quitting: 10.4   • Smokeless tobacco: Never Used   • Tobacco comment: quit last hospital visit   Substance and Sexual Activity   • Alcohol use: Not Currently   • Drug use: No   • Sexual activity: Not Currently   Other Topics Concern   • Not on file   Social History Narrative   • Not on file     Social Determinants of Health     Financial Resource Strain:    • Difficulty of Paying Living Expenses:    Food Insecurity:    • Worried About Running Out of Food in the Last Year:    • Ran Out of Food in the Last Year:    Transportation Needs:    • Lack of Transportation (Medical):    • Lack of Transportation (Non-Medical):    Physical Activity:    • Days of Exercise per Week:    • Minutes of Exercise per Session:    Stress:    • Feeling of Stress :    Social Connections:    •  Frequency of Communication with Friends and Family:    • Frequency of Social Gatherings with Friends and Family:    • Attends Mormonism Services:    • Active Member of Clubs or Organizations:    • Attends Club or Organization Meetings:    • Marital Status:    Intimate Partner Violence:    • Fear of Current or Ex-Partner:    • Emotionally Abused:    • Physically Abused:    • Sexually Abused:         Family History:  Family History   Problem Relation Age of Onset   • Arterial Aneurysm Father    • Cancer Maternal Grandmother         breast cancer       Depression and Opioid Screening  PHQ-9:  Depression Screen (PHQ-2/PHQ-9) 2/2/2021 2/3/2021 2/4/2021   PHQ-2 Total Score 0 0 0   PHQ-9 Total Score - - 0     Interpretation of PHQ-9 Total Score   Score Severity   1-4 No Depression   5-9 Mild Depression   10-14 Moderate Depression   15-19 Moderately Severe Depression   20-27 Severe Depression     OBJECTIVE:   Vital Signs:  Vitals:    03/05/21 1024   BP: 110/60   Pulse: (!) 104   Resp: 18   Temp: 36.4 °C (97.6 °F)        Pertinent Labs:  Lab Results   Component Value Date/Time    SODIUM 135 02/12/2021 10:40 AM    POTASSIUM 4.2 02/12/2021 10:40 AM    CHLORIDE 98 02/12/2021 10:40 AM    CO2 24 02/12/2021 10:40 AM    GLUCOSE 238 (H) 02/12/2021 10:40 AM    BUN 13 02/12/2021 10:40 AM    CREATININE 0.51 02/12/2021 10:40 AM       Lab Results   Component Value Date/Time    HBA1C 9.7 (H) 01/24/2021 03:25 AM       Lab Results   Component Value Date/Time    WBC 12.9 (H) 02/01/2021 05:20 AM    RBC 3.84 (L) 02/01/2021 05:20 AM    HEMOGLOBIN 12.2 02/01/2021 05:20 AM    HEMATOCRIT 36.5 (L) 02/01/2021 05:20 AM    MCV 95.1 02/01/2021 05:20 AM    MCH 31.8 02/01/2021 05:20 AM    MCHC 33.4 (L) 02/01/2021 05:20 AM    MPV 10.6 02/01/2021 05:20 AM    NEUTSPOLYS 79.10 (H) 01/26/2021 04:24 AM    LYMPHOCYTES 13.20 (L) 01/26/2021 04:24 AM    MONOCYTES 7.00 01/26/2021 04:24 AM    EOSINOPHILS 0.00 01/26/2021 04:24 AM    BASOPHILS 0.10 01/26/2021 04:24 AM        Lab Results   Component Value Date/Time    ASTSGOT 7 (L) 01/29/2021 03:05 AM    ALTSGPT 11 01/29/2021 03:05 AM        Physical Exam:   GEN: No apparent distress  HEENT: Head normocephalic, atraumatic.  Sclera nonicteric bilaterally, no ocular discharge appreciated bilaterally.  CV: Extremities warm and well-perfused, no peripheral edema appreciated bilaterally.  PULMONARY: Breathing nonlabored on room air, no respiratory accessory muscle use.  Not requiring supplemental oxygen.  ABD: Soft, nontender.  SKIN: Incision clean dry and intact.  Staples removed.  Scabbing appreciated.  PSYCH: Mood and affect within normal limits.  NEURO: Awake alert.  Conversational.  Logical thought content.  Robotic speech.  Hyper verbose.    Motor Exam Upper Extremities   ? Myotome R L   Shoulder Abduction C5 5 5   Elbow flexion C5 5 5   Wrist extension C6 5 5   Elbow extension C7 5 5   Finger flexion C8 5 5   Finger abduction T1 5 5     Motor Exam Lower Extremities  ? Myotome R L   Hip flexion L2 5 5   Knee extension L3 5 5   Ankle dorsiflexion L4 5 5   Toe extension L5 5 5   Ankle plantarflexion S1 5 5     No significant tone on exam.  Lori's negative bilaterally  Hyperreflexic L4, S1 bilaterally at 3+/4.  Normoreflexic at C5, C6 bilaterally at 2+/4    Imaging:   MR brain with and without 1/30/2021  1.  Status post resection of LEFT parietal extra-axial mass  2.  Small amount of blood in the surgical resection bed without significant mass effect  3.  Changes in the high LEFT parietal lobe adjacent to the surgical resection bed could be related to ischemia or recent seizure activity  4.  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 malformations as previously noted    ASSESSMENT/PLAN: Marni Muñoz  is a 45 y.o. female with rehabilitation history significant for meningioma s/p craniotomy for resection 1/29/2021, here for evaluation. The following plan was discussed with the patient who is in  agreement.     Visit Diagnoses     ICD-10-CM   1. Meningioma ([facility])  D32.9   2. Seizure ([facility])  R56.9   3. Expressive aphasia  R47.01   4. Neuropathic pain  M79.2        Rehab/Neuro:   1. Meningioma s/p craniotomy for resection 1/29/2021  2. Subclinical seizures: On Keppra/Vimpat.  3. Hyponatremia secondary to SIADH.  Off of salt tabs.  -Records reviewed as aforementioned in the HPI.  -Driving status: Not driving, DMV paperwork submitted while patient was acute.  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  I highly recommend that she wait is long as she can in order to return to work and when she does she does so at a graded pace.  Counseled patient on the aforementioned.  -Referral: Speech therapy for continued work with cognitive deficits.  Patient was a very high functioning, highly educated person before.  Has not had any speech therapy since discharge from hospital.  -Referral: Neuropsychology for baseline neuropsych assessment.  Patient would greatly benefit from neuropsychology evaluation as a baseline which can then be repeated in the future for comparison as she transitions to back to work.  Counseled patient on the aforementioned.  -Mobility: Ambulatory without assistive device.  No falls.    Spasticity: No spasticity on exam.  Is hyperreflexic.    Neuropathic Pain: Tingling in bilateral feet with certain sitting positions.  Self resolves.  -Do not recommend pharmacologic agents at this time.    Neurogenic Bladder:  -Bladder status: Voiding volitionally    Neurogenic Bowel:  -Bowel status: Continent, volitional.    Sleep: Sleeping well with Tylenol PM  -Med management: Continue Tylenol PM.    GI/Diet: S/p NG tube.  Denies symptoms of dysphagia.  -Diet: Regular    Skin: Incision healing well.  Sutures/staples removed.  There is an area of scabbing which has yet to resolve, but is healing.      Follow up: 2 months for follow-up evaluation  of preparedness for return to work, psychology evaluation, speech therapy    Total time spent was 30 minutes.  Included in this time is the time spent preparing for the visit including record review, my exam and evaluation, counseling and education regarding that which is aforementioned in the assessment and plan, and ordering the appropriate labs, tests, procedures, referrals, medications. Included this time as the time spent obtaining history from someone other than the patient. Discussion involved the patient and friend.    Please note that this dictation was created using voice recognition software. I have made every reasonable attempt to correct obvious errors but there may be errors of grammar and content that I may have overlooked prior to finalization of this note.    Dr. Jaclyn Shepard DO, MS  Department of Physical Medicine & Rehabilitation  Neuro Rehabilitation Clinic  [facility]

## 2021-03-06 NOTE — TELEPHONE ENCOUNTER
Patients friend, Rani calling in regarding patients most recent glucose reading. She states patients reading was 61 2 hours after eating lunch. They would like to know if patient should take fast acting insulin before dinner and take her metformin.    Spoke with Dr. Barkley in office. She states patient should skip fast acting insulin tonight, continue on metformin. With recent low readings, she recommends patient check her glucose before taking fast acting insulin. If glucose is above 180, continue fast acting insulin, if below, skip dose. She states patient is not having any hypoglycemic symptoms currently and is eating a snack.  Patient to follow-up with Barbara on Monday for further instruction and possible follow-up visit.

## 2021-04-06 ENCOUNTER — TELEPHONE (OUTPATIENT)
Dept: MEDICAL GROUP | Facility: IMAGING CENTER | Age: 46
End: 2021-04-06

## 2021-04-06 DIAGNOSIS — Z79.4 TYPE 2 DIABETES MELLITUS WITHOUT COMPLICATION, WITH LONG-TERM CURRENT USE OF INSULIN (HCC): ICD-10-CM

## 2021-04-06 DIAGNOSIS — E11.9 TYPE 2 DIABETES MELLITUS WITHOUT COMPLICATION, WITH LONG-TERM CURRENT USE OF INSULIN (HCC): ICD-10-CM

## 2021-04-06 NOTE — TELEPHONE ENCOUNTER
Hillary with [facility] called on behalf of patient to recertify services. Barbara Mcginnis PAC confirmed verbal order for recertification of services.

## 2021-04-13 ENCOUNTER — NON-PROVIDER VISIT (OUTPATIENT)
Dept: NEUROLOGY | Facility: MEDICAL CENTER | Age: 46
End: 2021-04-13
Attending: PHYSICIAN ASSISTANT
Payer: COMMERCIAL

## 2021-04-13 DIAGNOSIS — R56.9 SEIZURE (HCC): ICD-10-CM

## 2021-04-13 PROCEDURE — 95816 EEG AWAKE AND DROWSY: CPT | Mod: 26 | Performed by: PSYCHIATRY & NEUROLOGY

## 2021-04-13 PROCEDURE — 95816 EEG AWAKE AND DROWSY: CPT | Performed by: PSYCHIATRY & NEUROLOGY

## 2021-04-13 NOTE — PROCEDURES
VIDEO ELECTROENCEPHALOGRAM REPORT      Referring provider: Barbara Mcginnis PA-C    DOS: April 13, 2021 of 30-minute duration.    INDICATION:  Marni Muñoz 45 y.o. female presenting with a history of stereotaxic radiosurgery for a left parietal meningioma, placed on antiepileptic medication for symptomatic seizure.  EEG is now requested as baseline study.    CURRENT ANTIEPILEPTIC REGIMEN: Vimpat 100 mg, twice daily.    TECHNIQUE: 30 channel video electroencephalogram (EEG) was performed in accordance with the international 10-20 system. The study was reviewed in bipolar and referential montages. The recording examined the patient during wakeful and drowsy/sleep state(s).     DESCRIPTION OF THE RECORD:  During the wakefulness, the background showed a symmetrical 12 Hz alpha activity posteriorly with amplitude of 70 mV.  There was reactivity to eye closure/opening.  A normal anterior-posterior gradient was noted with faster beta frequencies seen anteriorly.  During drowsiness, theta/delta frequencies were seen.    During the sleep state, background shows diffuse high-amplitude 4-5 Hz delta activity.  Symmetrical high-amplitude sleep spindles and vertex sharps were seen in the leads over the central regions.     ACTIVATION PROCEDURES:   Hyperventilation was performed by the patient for a total of 3 minutes. The technician performing the test noted good effort. This technique produced electroencephalographic changes in keeping with the expected bilaterally synchronous, frontally predominant, high amplitude slow waves build up.     Intermittent Photic stimulation was performed in a stepwise fashion from 1 to 30 Hz and elicited a normal response (photic driving), most noticeable in the posterior leads.    ICTAL AND/OR INTERICTAL FINDINGS:   No focal or generalized epileptiform activity noted.  Brief intermittent slowing is seen over the left posterior temporal and anterior parietal regions, not associated with any  underlying irritability or nonconvulsive seizure activity.  Diffuse beta activity is seen giving these waveforms are more sharply contoured appearance.  Amplitudes are consistently higher over the left hemisphere in its entirety.    EKG: sampling of the EKG recording demonstrated sinus rhythm.     EVENTS: None    INTERPRETATION:  This is an abnormal video EEG recording in the awake and drowsy/sleep state(s).  Clinical correlation is recommended.  The findings suggest underlying left hemispheric dysfunction suggestive of structural pathology, no underlying seizure activity or epileptic potential are documented.    Note: A normal EEG does not rule out epilepsy.  If the clinical suspicion remains high for seizures, a prolonged recording to capture clinical or subclinical events may be helpful.        Srinivas Echeverria M.D.

## 2021-04-15 DIAGNOSIS — E11.9 TYPE 2 DIABETES MELLITUS WITHOUT COMPLICATION, WITH LONG-TERM CURRENT USE OF INSULIN (HCC): ICD-10-CM

## 2021-04-15 DIAGNOSIS — Z79.4 TYPE 2 DIABETES MELLITUS WITHOUT COMPLICATION, WITH LONG-TERM CURRENT USE OF INSULIN (HCC): ICD-10-CM

## 2021-04-15 RX ORDER — INSULIN GLARGINE 100 [IU]/ML
28 INJECTION, SOLUTION SUBCUTANEOUS EVERY EVENING
Qty: 9 ML | Refills: 3 | Status: SHIPPED | OUTPATIENT
Start: 2021-04-15 | End: 2021-05-21

## 2021-05-03 ENCOUNTER — HOSPITAL ENCOUNTER (OUTPATIENT)
Facility: MEDICAL CENTER | Age: 46
End: 2021-05-03
Attending: PHYSICIAN ASSISTANT
Payer: COMMERCIAL

## 2021-05-03 LAB
ALBUMIN SERPL BCP-MCNC: 4 G/DL (ref 3.2–4.9)
ALBUMIN/GLOB SERPL: 1.5 G/DL
ALP SERPL-CCNC: 44 U/L (ref 30–99)
ALT SERPL-CCNC: 10 U/L (ref 2–50)
ANION GAP SERPL CALC-SCNC: 11 MMOL/L (ref 7–16)
AST SERPL-CCNC: 7 U/L (ref 12–45)
BASOPHILS # BLD AUTO: 0.8 % (ref 0–1.8)
BASOPHILS # BLD: 0.06 K/UL (ref 0–0.12)
BILIRUB SERPL-MCNC: 0.3 MG/DL (ref 0.1–1.5)
BUN SERPL-MCNC: 12 MG/DL (ref 8–22)
CALCIUM SERPL-MCNC: 9.1 MG/DL (ref 8.5–10.5)
CHLORIDE SERPL-SCNC: 107 MMOL/L (ref 96–112)
CO2 SERPL-SCNC: 23 MMOL/L (ref 20–33)
CREAT SERPL-MCNC: 0.52 MG/DL (ref 0.5–1.4)
EOSINOPHIL # BLD AUTO: 0.19 K/UL (ref 0–0.51)
EOSINOPHIL NFR BLD: 2.6 % (ref 0–6.9)
ERYTHROCYTE [DISTWIDTH] IN BLOOD BY AUTOMATED COUNT: 41.7 FL (ref 35.9–50)
FASTING STATUS PATIENT QL REPORTED: NORMAL
GLOBULIN SER CALC-MCNC: 2.6 G/DL (ref 1.9–3.5)
GLUCOSE SERPL-MCNC: 145 MG/DL (ref 65–99)
HCT VFR BLD AUTO: 40.5 % (ref 37–47)
HGB BLD-MCNC: 13.2 G/DL (ref 12–16)
IMM GRANULOCYTES # BLD AUTO: 0.02 K/UL (ref 0–0.11)
IMM GRANULOCYTES NFR BLD AUTO: 0.3 % (ref 0–0.9)
LYMPHOCYTES # BLD AUTO: 2.97 K/UL (ref 1–4.8)
LYMPHOCYTES NFR BLD: 40.2 % (ref 22–41)
MCH RBC QN AUTO: 30.5 PG (ref 27–33)
MCHC RBC AUTO-ENTMCNC: 32.6 G/DL (ref 33.6–35)
MCV RBC AUTO: 93.5 FL (ref 81.4–97.8)
MONOCYTES # BLD AUTO: 0.5 K/UL (ref 0–0.85)
MONOCYTES NFR BLD AUTO: 6.8 % (ref 0–13.4)
NEUTROPHILS # BLD AUTO: 3.64 K/UL (ref 2–7.15)
NEUTROPHILS NFR BLD: 49.3 % (ref 44–72)
NRBC # BLD AUTO: 0 K/UL
NRBC BLD-RTO: 0 /100 WBC
PLATELET # BLD AUTO: 378 K/UL (ref 164–446)
PMV BLD AUTO: 10.9 FL (ref 9–12.9)
POTASSIUM SERPL-SCNC: 4.3 MMOL/L (ref 3.6–5.5)
PROT SERPL-MCNC: 6.6 G/DL (ref 6–8.2)
RBC # BLD AUTO: 4.33 M/UL (ref 4.2–5.4)
SODIUM SERPL-SCNC: 141 MMOL/L (ref 135–145)
WBC # BLD AUTO: 7.4 K/UL (ref 4.8–10.8)

## 2021-05-03 PROCEDURE — 80053 COMPREHEN METABOLIC PANEL: CPT

## 2021-05-03 PROCEDURE — 85025 COMPLETE CBC W/AUTO DIFF WBC: CPT

## 2021-05-04 ENCOUNTER — IMMUNIZATION (OUTPATIENT)
Dept: FAMILY PLANNING/WOMEN'S HEALTH CLINIC | Facility: IMMUNIZATION CENTER | Age: 46
End: 2021-05-04

## 2021-05-04 ENCOUNTER — HOSPITAL ENCOUNTER (OUTPATIENT)
Facility: MEDICAL CENTER | Age: 46
End: 2021-05-04
Attending: PHYSICIAN ASSISTANT
Payer: COMMERCIAL

## 2021-05-04 ENCOUNTER — OFFICE VISIT (OUTPATIENT)
Dept: MEDICAL GROUP | Facility: IMAGING CENTER | Age: 46
End: 2021-05-04
Payer: COMMERCIAL

## 2021-05-04 VITALS
DIASTOLIC BLOOD PRESSURE: 80 MMHG | TEMPERATURE: 97.3 F | SYSTOLIC BLOOD PRESSURE: 108 MMHG | BODY MASS INDEX: 36.48 KG/M2 | HEART RATE: 107 BPM | OXYGEN SATURATION: 96 % | WEIGHT: 227 LBS | RESPIRATION RATE: 16 BRPM | HEIGHT: 66 IN

## 2021-05-04 DIAGNOSIS — Z12.31 ENCOUNTER FOR SCREENING MAMMOGRAM FOR BREAST CANCER: ICD-10-CM

## 2021-05-04 DIAGNOSIS — Z12.4 SCREENING FOR CERVICAL CANCER: ICD-10-CM

## 2021-05-04 DIAGNOSIS — Z79.4 TYPE 2 DIABETES MELLITUS WITHOUT COMPLICATION, WITH LONG-TERM CURRENT USE OF INSULIN (HCC): ICD-10-CM

## 2021-05-04 DIAGNOSIS — Z23 ENCOUNTER FOR VACCINATION: Primary | ICD-10-CM

## 2021-05-04 DIAGNOSIS — Z01.419 ENCOUNTER FOR GYNECOLOGICAL EXAMINATION: ICD-10-CM

## 2021-05-04 DIAGNOSIS — Z11.51 SCREENING FOR HPV (HUMAN PAPILLOMAVIRUS): ICD-10-CM

## 2021-05-04 DIAGNOSIS — E11.9 TYPE 2 DIABETES MELLITUS WITHOUT COMPLICATION, WITH LONG-TERM CURRENT USE OF INSULIN (HCC): ICD-10-CM

## 2021-05-04 DIAGNOSIS — D32.9 MENINGIOMA (HCC): ICD-10-CM

## 2021-05-04 PROBLEM — G93.9 BRAIN LESION: Status: RESOLVED | Noted: 2021-01-27 | Resolved: 2021-05-04

## 2021-05-04 PROBLEM — E87.1 HYPONATREMIA: Status: RESOLVED | Noted: 2021-01-27 | Resolved: 2021-05-04

## 2021-05-04 LAB
HBA1C MFR BLD: 6.1 % (ref 0–5.6)
INT CON NEG: ABNORMAL
INT CON POS: ABNORMAL

## 2021-05-04 PROCEDURE — 0001A PFIZER SARS-COV-2 VACCINE: CPT

## 2021-05-04 PROCEDURE — 87624 HPV HI-RISK TYP POOLED RSLT: CPT

## 2021-05-04 PROCEDURE — 88175 CYTOPATH C/V AUTO FLUID REDO: CPT

## 2021-05-04 PROCEDURE — 83036 HEMOGLOBIN GLYCOSYLATED A1C: CPT | Performed by: PHYSICIAN ASSISTANT

## 2021-05-04 PROCEDURE — 99396 PREV VISIT EST AGE 40-64: CPT | Performed by: PHYSICIAN ASSISTANT

## 2021-05-04 PROCEDURE — 91300 PFIZER SARS-COV-2 VACCINE: CPT

## 2021-05-04 ASSESSMENT — FIBROSIS 4 INDEX: FIB4 SCORE: 0.28

## 2021-05-04 ASSESSMENT — PAIN SCALES - GENERAL: PAINLEVEL: NO PAIN

## 2021-05-04 ASSESSMENT — PATIENT HEALTH QUESTIONNAIRE - PHQ9: CLINICAL INTERPRETATION OF PHQ2 SCORE: 0

## 2021-05-04 NOTE — PROGRESS NOTES
Subjective:     CC:   Chief Complaint   Patient presents with   • Gynecologic Exam     HPI:   Marni Muñoz is a 45 y.o. patient who presents for annual gynecological exam. She is feeling well and denies any complaints.    Ob-Gyn/ History:    Patient has GYN provider:   /Para: 4/0 - 2 abortions and 2 miscarriages  Last pap: around 6 years ago  H/O of abnormal pap smears: no  Current Contraceptive Method: none  Sexually active: no  Last menstrual period: 2021   Periods: irregular  Cramping: monthly  Breast Cancer Screening: due     has a past medical history of Brain lesion (2021), Contusion (2011), Hyponatremia (2021), and Migraine.     has a past surgical history that includes cholecystectomy; other orthopedic surgery; and craniotomy stealth (Left, 2021).    Family History   Problem Relation Age of Onset   • Arterial Aneurysm Father    • Cancer Maternal Grandmother         breast cancer       Social History     Socioeconomic History   • Marital status: Single     Spouse name: Not on file   • Number of children: Not on file   • Years of education: Not on file   • Highest education level: Not on file   Occupational History   • Not on file   Tobacco Use   • Smoking status: Former Smoker     Packs/day: 0.25     Years: 22.00     Pack years: 5.50     Types: Cigarettes     Quit date: 10/8/2010     Years since quitting: 10.5   • Smokeless tobacco: Never Used   • Tobacco comment: quit last hospital visit   Substance and Sexual Activity   • Alcohol use: Not Currently   • Drug use: No   • Sexual activity: Not Currently   Other Topics Concern   • Not on file   Social History Narrative   • Not on file     Social Determinants of Health     Financial Resource Strain:    • Difficulty of Paying Living Expenses:    Food Insecurity:    • Worried About Running Out of Food in the Last Year:    • Ran Out of Food in the Last Year:    Transportation Needs:    • Lack of Transportation (Medical):    • Lack of  Transportation (Non-Medical):    Physical Activity:    • Days of Exercise per Week:    • Minutes of Exercise per Session:    Stress:    • Feeling of Stress :    Social Connections:    • Frequency of Communication with Friends and Family:    • Frequency of Social Gatherings with Friends and Family:    • Attends [employer] Services:    • Active Member of Clubs or Organizations:    • Attends Club or Organization Meetings:    • Marital Status:    Intimate Partner Violence:    • Fear of Current or Ex-Partner:    • Emotionally Abused:    • Physically Abused:    • Sexually Abused:        Patient Active Problem List    Diagnosis Date Noted   • Seizure ([facility]) 01/29/2021   • Meningioma ([facility]) 01/21/2021   • Diabetes mellitus ([facility]) 01/24/2021   • Tobacco use 03/01/2021   • Obesity (BMI 30.0-34.9) 01/25/2021         Current Outpatient Medications   Medication Sig Dispense Refill   • insulin glargine (LANTUS SOLOSTAR) 100 UNIT/ML Solution Pen-injector injection Inject 28 Units under the skin every evening. 9 mL 3   • Insulin Pen Needle 32 G x 4 mm Use as directed with insulin pen 120 Each 3   • lacosamide (VIMPAT) 100 MG Tab tablet Take  by mouth 2 times a day. Tapering off     • metFORMIN (GLUCOPHAGE) 500 MG Tab Take 1 tablet by mouth 2 times a day, with meals. 120 tablet 1   • levetiracetam (KEPPRA) 1000 MG tablet Take 1 Tab by mouth 2 Times a Day. 60 Tab 0   • Blood Glucose Monitoring Suppl (BLOOD GLUCOSE MONITOR SYSTEM) w/Device Kit Test blood sugar as recommended by provider 1 Kit 0   • glucose blood strip Use one strip to test blood sugar three times daily 100 Strip 0   • Lancets Use one One Touch Verio Flex lancet to test blood sugar three times daily. 100 Each 0   • acetaminophen (TYLENOL) 500 MG Tab Take 1,000 mg by mouth 2 times a day as needed for Moderate Pain.     • Ascorbic Acid (VITAMIN C) 1000 MG Tab Take 1 Tab by mouth every day.     • Multiple Vitamins-Minerals (OCUVITE PO) Take 1 Tab by mouth every day.       No  "current facility-administered medications for this visit.     Allergies   Allergen Reactions   • Amoxicillin Rash   • Food Vomiting     Bananas and marijuana   • Potassium      Itchy and swollen lips       Review of Systems   Constitutional: Negative for fever, chills and malaise/fatigue.    Respiratory: Negative for cough and shortness of breath.  Cardiovascular: Negative for chest pain, palpitations, or leg swelling.   Gastrointestinal: Negative for nausea, vomiting, abdominal pain and diarrhea.   Genitourinary: Negative for dysuria and hematuria.   Skin: Negative for rash.    Psychiatric/Behavioral: Negative for depression.      Objective:     /80 (BP Location: Right arm, Patient Position: Sitting, BP Cuff Size: Adult)   Pulse (!) 107   Temp 36.3 °C (97.3 °F) (Temporal)   Resp 16   Ht 1.676 m (5' 6\")   Wt 103 kg (227 lb)   SpO2 96%   BMI 36.64 kg/m²   Body mass index is 36.64 kg/m².  Wt Readings from Last 4 Encounters:   05/04/21 103 kg (227 lb)   03/23/21 101 kg (222 lb 7.1 oz)   03/05/21 97.5 kg (215 lb)   03/01/21 97.1 kg (214 lb)       Physical Exam:  Constitutional: Well-developed and well-nourished. Not diaphoretic. No distress.   Skin: Skin is warm and dry. No rash noted.  Head: Atraumatic without lesions.  Neck: Supple, trachea midline. Normal range of motion. No thyromegaly present. No lymphadenopathy--cervical or supraclavicular.  Cardiovascular: Regular rate and rhythm, S1 and S2 without murmur, rubs, or gallops.  Lungs: Normal inspiratory effort, CTA bilaterally, no wheezes/rhonchi/rales    Breast: Breasts examined seated and supine. No skin changes, peau d'orange or nipple retraction. No discharge. No axillary or supraclavicular adenopathy. No masses or nodularity palpable.   Abdomen: Soft, non tender, and without distention. Active bowel sounds in all four quadrants. No rebound, guarding, masses or HSM.    :Perineum and external genitalia normal without rash.   Vagina with white " discharge.   Cervix only partially visualized due to patient's body habitus and inability to properly position in the stirrups.  Visualized portion within normal limits.  Bimanual exam without adnexal masses or cervical motion tenderness.    Extremities: No cyanosis, clubbing, erythema, nor edema. Distal pulses intact and symmetric.     A chaperone was offered to the patient during today's exam. Chaperone name: Tiffani TAVARES was present.    Assessment and Plan:.     1. Screening for cervical cancer  - Thinprep Pap with HPV; Future    2. Encounter for gynecological examination  - Thinprep Pap with HPV; Future    3. Screening for HPV (human papillomavirus)  - Thinprep Pap with HPV; Future    4. Encounter for screening mammogram for breast cancer  - MA-SCREENING MAMMO BILAT W/TOMOSYNTHESIS W/CAD; Future    5. Type 2 diabetes mellitus without complication, with long-term current use of insulin (HCC)  Chronic, controlled.  A1c 6.1.  Patient will follow up in 2 weeks for a diabetes recheck and will likely decrease her insulin and increase her Metformin to optimize oral hypoglycemic agents.  Future SGLT2 inhibitor.  - POCT  A1C    6. Meningioma (HCC)  Follow-up with neurosurgery as scheduled        Follow-up: Return in about 2 weeks (around 5/18/2021) for Diabetes recheck.    Barbara Knapp PA-C ([place])  Physician Assistant Certified  [facility]

## 2021-05-05 LAB
CYTOLOGY REG CYTOL: NORMAL
HPV HR 12 DNA CVX QL NAA+PROBE: NEGATIVE
HPV16 DNA SPEC QL NAA+PROBE: NEGATIVE
HPV18 DNA SPEC QL NAA+PROBE: NEGATIVE
SPECIMEN SOURCE: NORMAL

## 2021-05-19 ENCOUNTER — OFFICE VISIT (OUTPATIENT)
Dept: PHYSICAL MEDICINE AND REHAB | Facility: REHABILITATION | Age: 46
End: 2021-05-19
Payer: COMMERCIAL

## 2021-05-19 VITALS
SYSTOLIC BLOOD PRESSURE: 116 MMHG | WEIGHT: 226 LBS | RESPIRATION RATE: 18 BRPM | DIASTOLIC BLOOD PRESSURE: 70 MMHG | HEART RATE: 70 BPM | BODY MASS INDEX: 36.48 KG/M2 | TEMPERATURE: 97.6 F

## 2021-05-19 DIAGNOSIS — R47.01 EXPRESSIVE APHASIA: ICD-10-CM

## 2021-05-19 DIAGNOSIS — Z87.898 HISTORY OF SEIZURES: ICD-10-CM

## 2021-05-19 DIAGNOSIS — R41.3 IMPAIRMENT OF SHORT-TERM AND LONG-TERM MEMORY: ICD-10-CM

## 2021-05-19 DIAGNOSIS — D32.9 MENINGIOMA (HCC): Primary | ICD-10-CM

## 2021-05-19 PROCEDURE — 99214 OFFICE O/P EST MOD 30 MIN: CPT | Performed by: PHYSICAL MEDICINE & REHABILITATION

## 2021-05-19 ASSESSMENT — ENCOUNTER SYMPTOMS
CONSTIPATION: 0
MEMORY LOSS: 1
SEIZURES: 0
DIARRHEA: 0
DIZZINESS: 0
COUGH: 0
FEVER: 0
SHORTNESS OF BREATH: 0
CHILLS: 0
FOCAL WEAKNESS: 0

## 2021-05-19 ASSESSMENT — FIBROSIS 4 INDEX: FIB4 SCORE: 0.28

## 2021-05-19 NOTE — PROGRESS NOTES
[facility]  [facility]  [address] [place] [address]  Ph: [phone]    FOLLOW UP PATIENT EVALUATION    Patient Name: Marni Muñoz   Patient : 1975  Patient Age: 45 y.o.     Examining Physician: Dr. Jaclyn Shepard DO    SUBJECTIVE:   Patient Identification: Marni Muñoz is a 45 y.o. female with PMH significant for migraines and rehabilitation history significant for meningioma s/p craniotomy for resection 2021 and is presenting to PM&R clinic for a FOLLOW UP OUTPATIENT evaluation with the following chief complaint/s:    Chief Complaint: Doing well.     History of Present Illness:    - Had EEG recently. Being weaned off of Vimpat. Keep Keppra lifelong.   - Has seen neuropsychology. Records not available.   - Will be evaluated for return to driving after repeat EEG and off of Vimpat for 3 months.   - Discharged from speech therapy.   - Typing improving.   - Working at an average of high school level.   - Going to initiate self therapy with art.   - Will start learning new language and reading music.  -Able to walk for 30 minutes to 1 hour consistently.    -Speech significantly improving.    Review of Systems:  Review of Systems   Constitutional: Negative for chills and fever.   Respiratory: Negative for cough and shortness of breath.    Gastrointestinal: Negative for constipation and diarrhea.   Genitourinary: Negative for dysuria, frequency and urgency.   Neurological: Negative for dizziness, focal weakness and seizures.        Speech improving.   Psychiatric/Behavioral: Positive for memory loss ( Improving.).      All other pertinent positive review of systems are noted above in HPI.   All other systems reviewed and are negative.    Past Medical History:  Past Medical History:   Diagnosis Date   • Hyponatremia 2021   • Brain lesion 2021   • Contusion 2011   • Migraine       Past Surgical History:   Procedure Laterality Date   • CRANIOTOMY STEALTH Left  1/29/2021    Procedure: CRANIOTOMY, USING FRAMELESS STEREOTAXY-FOR TUMOR;  Surgeon: Luke Soto M.D.;  Location: SURGERY [facility];  Service: Neurosurgery   • CHOLECYSTECTOMY     • OTHER ORTHOPEDIC SURGERY      L meniscus and ACL repair        Current Outpatient Medications:   •  insulin glargine (LANTUS SOLOSTAR) 100 UNIT/ML Solution Pen-injector injection, Inject 28 Units under the skin every evening., Disp: 9 mL, Rfl: 3  •  Insulin Pen Needle 32 G x 4 mm, Use as directed with insulin pen, Disp: 120 Each, Rfl: 3  •  lacosamide (VIMPAT) 100 MG Tab tablet, Take  by mouth 2 times a day. Tapering off, Disp: , Rfl:   •  metFORMIN (GLUCOPHAGE) 500 MG Tab, Take 1 tablet by mouth 2 times a day, with meals., Disp: 120 tablet, Rfl: 1  •  levetiracetam (KEPPRA) 1000 MG tablet, Take 1 Tab by mouth 2 Times a Day., Disp: 60 Tab, Rfl: 0  •  Blood Glucose Monitoring Suppl (BLOOD GLUCOSE MONITOR SYSTEM) w/Device Kit, Test blood sugar as recommended by provider, Disp: 1 Kit, Rfl: 0  •  glucose blood strip, Use one strip to test blood sugar three times daily, Disp: 100 Strip, Rfl: 0  •  Lancets, Use one One Touch Verio Flex lancet to test blood sugar three times daily., Disp: 100 Each, Rfl: 0  •  acetaminophen (TYLENOL) 500 MG Tab, Take 1,000 mg by mouth 2 times a day as needed for Moderate Pain., Disp: , Rfl:   •  Ascorbic Acid (VITAMIN C) 1000 MG Tab, Take 1 Tab by mouth every day., Disp: , Rfl:   •  Multiple Vitamins-Minerals (OCUVITE PO), Take 1 Tab by mouth every day., Disp: , Rfl:   Allergies   Allergen Reactions   • Amoxicillin Rash   • Food Vomiting     Bananas and marijuana   • Potassium      Itchy and swollen lips        Past Social History:  Social History     Socioeconomic History   • Marital status: Single     Spouse name: Not on file   • Number of children: Not on file   • Years of education: Not on file   • Highest education level: Not on file   Occupational History   • Not on file   Tobacco Use   •  Smoking status: Former Smoker     Packs/day: 0.25     Years: 22.00     Pack years: 5.50     Types: Cigarettes     Quit date: 10/8/2010     Years since quitting: 10.6   • Smokeless tobacco: Never Used   • Tobacco comment: quit last hospital visit   Vaping Use   • Vaping Use: Never used   Substance and Sexual Activity   • Alcohol use: Not Currently   • Drug use: No   • Sexual activity: Not Currently   Other Topics Concern   • Not on file   Social History Narrative   • Not on file     Social Determinants of Health     Financial Resource Strain:    • Difficulty of Paying Living Expenses:    Food Insecurity:    • Worried About Running Out of Food in the Last Year:    • Ran Out of Food in the Last Year:    Transportation Needs:    • Lack of Transportation (Medical):    • Lack of Transportation (Non-Medical):    Physical Activity:    • Days of Exercise per Week:    • Minutes of Exercise per Session:    Stress:    • Feeling of Stress :    Social Connections:    • Frequency of Communication with Friends and Family:    • Frequency of Social Gatherings with Friends and Family:    • Attends [employer] Services:    • Active Member of Clubs or Organizations:    • Attends Club or Organization Meetings:    • Marital Status:    Intimate Partner Violence:    • Fear of Current or Ex-Partner:    • Emotionally Abused:    • Physically Abused:    • Sexually Abused:         Family History:  Family History   Problem Relation Age of Onset   • Arterial Aneurysm Father    • Cancer Maternal Grandmother         breast cancer       Depression and Opioid Screening  PHQ-9:  Depression Screen (PHQ-2/PHQ-9) 2/3/2021 2/4/2021 5/4/2021   PHQ-2 Total Score 0 0 -   PHQ-2 Total Score - - 0   PHQ-9 Total Score - 0 -     Interpretation of PHQ-9 Total Score   Score Severity   1-4 No Depression   5-9 Mild Depression   10-14 Moderate Depression   15-19 Moderately Severe Depression   20-27 Severe Depression     OBJECTIVE:   Vital Signs:  Vitals:    05/19/21 1337    BP: 116/70   Pulse: 70   Resp: 18   Temp: 36.4 °C (97.6 °F)        Pertinent Labs:  Lab Results   Component Value Date/Time    SODIUM 141 05/03/2021 09:35 AM    POTASSIUM 4.3 05/03/2021 09:35 AM    CHLORIDE 107 05/03/2021 09:35 AM    CO2 23 05/03/2021 09:35 AM    GLUCOSE 145 (H) 05/03/2021 09:35 AM    BUN 12 05/03/2021 09:35 AM    CREATININE 0.52 05/03/2021 09:35 AM       Lab Results   Component Value Date/Time    HBA1C 6.1 (A) 05/04/2021 01:24 PM       Lab Results   Component Value Date/Time    WBC 7.4 05/03/2021 09:35 AM    RBC 4.33 05/03/2021 09:35 AM    HEMOGLOBIN 13.2 05/03/2021 09:35 AM    HEMATOCRIT 40.5 05/03/2021 09:35 AM    MCV 93.5 05/03/2021 09:35 AM    MCH 30.5 05/03/2021 09:35 AM    MCHC 32.6 (L) 05/03/2021 09:35 AM    MPV 10.9 05/03/2021 09:35 AM    NEUTSPOLYS 49.30 05/03/2021 09:35 AM    LYMPHOCYTES 40.20 05/03/2021 09:35 AM    MONOCYTES 6.80 05/03/2021 09:35 AM    EOSINOPHILS 2.60 05/03/2021 09:35 AM    BASOPHILS 0.80 05/03/2021 09:35 AM       Lab Results   Component Value Date/Time    ASTSGOT 7 (L) 05/03/2021 09:35 AM    ALTSGPT 10 05/03/2021 09:35 AM        Physical Exam:   GEN: No apparent distress  HEENT: Head normocephalic, craniotomy site intact.  Sclera nonicteric bilaterally, no ocular discharge appreciated bilaterally.  CV: Extremities warm and well-perfused, no peripheral edema appreciated bilaterally.  PULMONARY: Breathing nonlabored on room air, no respiratory accessory muscle use.  Not requiring supplemental oxygen.  ABD: Soft, nontender.  SKIN: No appreciable skin breakdown on exposed areas of skin.  PSYCH: Mood and affect within normal limits.  NEURO: Awake alert.  Conversational.  Logical thought content.  Ambulatory without assistive device.      ASSESSMENT/PLAN: Marni Muñoz  is a 45 y.o. female with rehabilitation history significant for meningioma s/p craniotomy for resection 1/29/2021, here for evaluation. The following plan was discussed with the patient who is in agreement.      Visit Diagnoses     ICD-10-CM   1. Meningioma (HCC)  D32.9   2. History of seizures  Z87.898   3. Expressive aphasia  R47.01   4. Impairment of short-term and long-term memory  R41.3      Friend assists with history.    Rehab/Neuro:   1. Meningioma s/p craniotomy for resection 1/29/2021  2. Subclinical seizures: On Keppra/Vimpat.  3. Hyponatremia secondary to SIADH.  Off of salt tabs.  -Therapy: Continue self initiated home therapy program  -Driving status: Currently not driving on Vimpat wean and might be considered for driving reevaluation after 3 months from Vimpat wean.  (Approximately September-October)  -Neuropsychology: Established with Dr. Medrano.  Will be seen again in late June.  Hold on return to work for now, needs continued cognitive therapy.  -Mobility: Ambulatory without assistive device.  -Paperwork: Time will be spent filling out patient short-term disability when she provides our office with it.  -Consultants: Neurosurgery, neurology  -Occupation: Patient worked long hours in a very high functioning position.  Strongly recommend that she remain out of work at this time until maximal neuro recovery.  This will be reevaluated on an ongoing basis.  -Record request: Neuropsychology notes.  Patient verbalized permission today during clinic.    Follow up: July for long-term disability paperwork.    Total time spent was 32 minutes.  Included in this time is the time spent preparing for the visit including record review, my exam and evaluation, counseling and education regarding that which is aforementioned in the assessment and plan. Included this time as the time spent obtaining history from someone other than the patient. Discussion involved the patient and friend.    Please note that this dictation was created using voice recognition software. I have made every reasonable attempt to correct obvious errors but there may be errors of grammar and content that I may have overlooked prior to  finalization of this note.    Dr. Jaclyn Shepard DO, MS  Department of Physical Medicine & Rehabilitation  Neuro Rehabilitation Clinic  [facility]

## 2021-05-21 ENCOUNTER — OFFICE VISIT (OUTPATIENT)
Dept: MEDICAL GROUP | Facility: IMAGING CENTER | Age: 46
End: 2021-05-21
Payer: COMMERCIAL

## 2021-05-21 VITALS
DIASTOLIC BLOOD PRESSURE: 60 MMHG | RESPIRATION RATE: 12 BRPM | OXYGEN SATURATION: 94 % | SYSTOLIC BLOOD PRESSURE: 96 MMHG | HEIGHT: 66 IN | HEART RATE: 107 BPM | TEMPERATURE: 97.6 F | BODY MASS INDEX: 37.61 KG/M2 | WEIGHT: 234 LBS

## 2021-05-21 DIAGNOSIS — R69 TAKING MEDICATION FOR CHRONIC DISEASE: ICD-10-CM

## 2021-05-21 DIAGNOSIS — N91.2 AMENORRHEA: ICD-10-CM

## 2021-05-21 DIAGNOSIS — Z79.4 TYPE 2 DIABETES MELLITUS WITHOUT COMPLICATION, WITH LONG-TERM CURRENT USE OF INSULIN (HCC): ICD-10-CM

## 2021-05-21 DIAGNOSIS — E11.9 TYPE 2 DIABETES MELLITUS WITHOUT COMPLICATION, WITH LONG-TERM CURRENT USE OF INSULIN (HCC): ICD-10-CM

## 2021-05-21 PROCEDURE — 99214 OFFICE O/P EST MOD 30 MIN: CPT | Performed by: PHYSICIAN ASSISTANT

## 2021-05-21 RX ORDER — INSULIN GLARGINE 100 [IU]/ML
22 INJECTION, SOLUTION SUBCUTANEOUS EVERY EVENING
Qty: 9 ML | Refills: 3 | Status: SHIPPED | OUTPATIENT
Start: 2021-05-21 | End: 2021-06-18

## 2021-05-21 RX ORDER — ATORVASTATIN CALCIUM 20 MG/1
20 TABLET, FILM COATED ORAL
Qty: 90 TABLET | Refills: 0 | Status: SHIPPED | OUTPATIENT
Start: 2021-05-21 | End: 2021-08-18 | Stop reason: SDUPTHER

## 2021-05-21 ASSESSMENT — FIBROSIS 4 INDEX: FIB4 SCORE: 0.28

## 2021-05-21 ASSESSMENT — PAIN SCALES - GENERAL: PAINLEVEL: 2=MINIMAL-SLIGHT

## 2021-05-22 NOTE — PROGRESS NOTES
Subjective:     CC:   Chief Complaint   Patient presents with   • Diabetes Follow-up       HPI:   Marni presents today for DM follow up with friend Rani    Diabetes mellitus (HCC)  Patient currently on Lantus 28 units every evening.  Glucose in the morning ranging between 90 and 110.  Patient has not required any sliding scale insulin as her preprandial glucose has been under 130.  No hypoglycemic episodes.  She is also on Metformin 500 twice a day.  Patient interested in trying to wean down on the insulin as she has gained a significant amount of weight and would like to try more oral medications.    Amenorrhea  Patient has not had a menstrual cycle in 6 months.  She states that she feels like she is about to have her period every month and then it does not happen.  She is not sure if she is going through menopause.  No pelvic pain.      Past Medical History:   Diagnosis Date   • Brain lesion 1/27/2021   • Contusion 4/8/2011   • Hyponatremia 1/27/2021   • Migraine      Social History     Tobacco Use   • Smoking status: Former Smoker     Packs/day: 0.25     Years: 22.00     Pack years: 5.50     Types: Cigarettes     Quit date: 10/8/2010     Years since quitting: 10.6   • Smokeless tobacco: Never Used   • Tobacco comment: quit last hospital visit   Vaping Use   • Vaping Use: Never used   Substance Use Topics   • Alcohol use: Not Currently   • Drug use: No     Current Outpatient Medications Ordered in Epic   Medication Sig Dispense Refill   • atorvastatin (LIPITOR) 20 MG Tab Take 1 tablet by mouth at bedtime. 90 tablet 0   • metFORMIN (GLUCOPHAGE) 1000 MG tablet Take 1 tablet by mouth 2 times a day with meals for 90 days. 180 tablet 0   • insulin glargine (LANTUS SOLOSTAR) 100 UNIT/ML Solution Pen-injector injection Inject 22 Units under the skin every evening. 9 mL 3   • Insulin Pen Needle 32 G x 4 mm Use as directed with insulin pen 120 Each 3   • lacosamide (VIMPAT) 100 MG Tab tablet Take  by mouth 2 times a  "day. Tapering off, taking half (50mg) every other day     • levetiracetam (KEPPRA) 1000 MG tablet Take 1 Tab by mouth 2 Times a Day. 60 Tab 0   • Blood Glucose Monitoring Suppl (BLOOD GLUCOSE MONITOR SYSTEM) w/Device Kit Test blood sugar as recommended by provider 1 Kit 0   • glucose blood strip Use one strip to test blood sugar three times daily 100 Strip 0   • Lancets Use one One Touch Verio Flex lancet to test blood sugar three times daily. 100 Each 0   • acetaminophen (TYLENOL) 500 MG Tab Take 1,000 mg by mouth 2 times a day as needed for Moderate Pain.     • Ascorbic Acid (VITAMIN C) 1000 MG Tab Take 1 Tab by mouth every day.     • Multiple Vitamins-Minerals (OCUVITE PO) Take 1 Tab by mouth every day.       No current [facility]-ordered facility-administered medications on file.     Amoxicillin, Food, and Potassium    Health Maintenance: Completed    ROS:  Gen: no fevers/chills, no changes in weight  Eyes: no changes in vision  ENT: no sore throat, no hearing loss, no bloody nose  Pulm: no sob, no cough  CV: no chest pain, no palpitations, no edema  GI: no nausea/vomiting, no diarrhea  : no dysuria  Skin: no rash, no lesions  Neuro: no headaches, no numbness/tingling  Heme/Lymph: no easy bruising or bleeding  Objective:   Exam:  BP (!) 96/60 (BP Location: Right arm, Patient Position: Sitting, BP Cuff Size: Adult)   Pulse (!) 107   Temp 36.4 °C (97.6 °F) (Temporal)   Resp 12   Ht 1.676 m (5' 6\")   Wt 106 kg (234 lb)   LMP 12/27/2020 (Exact Date)   SpO2 94%   BMI 37.77 kg/m²    Body mass index is 37.77 kg/m².    Gen: Alert and oriented, No apparent distress.  HEENT: Head atraumatic, normocephalic. PERRLA. No oral lesions.  Neck: Neck is supple without lymphadenopathy.   Lungs: Normal effort, CTA bilaterally, no wheezes, rhonchi, or rales  CV: Regular rate and rhythm. No murmurs, rubs, or gallops.  ABD: +BS. Non-tender, non-distended. No rebound, rigidity, or guarding.  Ext: No clubbing, cyanosis, " edema.    Monofilament testing with a 10 gram force: sensation intact: intact bilaterally  Visual Inspection: Feet without maceration, ulcers, fissures.  Pedal pulses: intact bilaterally    Assessment & Plan:     45 y.o. female with the following -     1. Type 2 diabetes mellitus without complication, with long-term current use of insulin (HCC)  Controlled, will start to wean down insulin and optimize oral agents.  Will decrease Lantus to 22 units every evening and increase Metformin to 1000 mg twice a day.  Patient has 500 mg tablets at home that she will double up on.  Will consider SGLT2 inhibitor next visit.  We will also add a statin medication for cardiovascular benefit.  Fasting lipid profile next visit.  Urine microalbumin next visit.  Patient unable to leave today.  Monofilament exam performed today.  A1c in August 2021.  - Diabetic Monofilament LE Exam  - atorvastatin (LIPITOR) 20 MG Tab; Take 1 tablet by mouth at bedtime.  Dispense: 90 tablet; Refill: 0  - metFORMIN (GLUCOPHAGE) 1000 MG tablet; Take 1 tablet by mouth 2 times a day with meals for 90 days.  Dispense: 180 tablet; Refill: 0  - insulin glargine (LANTUS SOLOSTAR) 100 UNIT/ML Solution Pen-injector injection; Inject 22 Units under the skin every evening.  Dispense: 9 mL; Refill: 3    2. Amenorrhea  May be related to perimenopause.  See lab orders below.  - ESTROGEN TOTAL; Future  - FSH/LH; Future  - PROGESTERONE; Future  - US-PELVIC COMPLETE (TRANSABDOMINAL/TRANSVAGINAL) (COMBO); Future  - TSH WITH REFLEX TO FT4; Future    3. Taking medication for chronic disease      Return in about 3 weeks (around 6/11/2021) for Diabetes recheck.    Barbara Knapp PA-C ([place])  Physician Assistant Certified  [facility]    Please note that this dictation was created using voice recognition software. I have made every reasonable attempt to correct obvious errors, but I expect that there are errors of grammar and possibly content that I did not  discover before finalizing the note.

## 2021-05-22 NOTE — ASSESSMENT & PLAN NOTE
Patient currently on Lantus 28 units every evening.  Glucose in the morning ranging between 90 and 110.  Patient has not required any sliding scale insulin as her preprandial glucose has been under 130.  No hypoglycemic episodes.  She is also on Metformin 500 twice a day.  Patient interested in trying to wean down on the insulin as she has gained a significant amount of weight and would like to try more oral medications.

## 2021-05-22 NOTE — PATIENT INSTRUCTIONS
Decrease Lantus to 22 units. If morning glucose under 90, then decrease Lantus to 20 units. Check glucose every morning and before biggest meal and two hours after biggest meal.

## 2021-05-22 NOTE — ASSESSMENT & PLAN NOTE
Patient has not had a menstrual cycle in 6 months.  She states that she feels like she is about to have her period every month and then it does not happen.  She is not sure if she is going through menopause.  No pelvic pain.

## 2021-05-28 ENCOUNTER — HOSPITAL ENCOUNTER (OUTPATIENT)
Dept: RADIOLOGY | Facility: MEDICAL CENTER | Age: 46
End: 2021-05-28
Attending: PHYSICIAN ASSISTANT
Payer: COMMERCIAL

## 2021-05-28 DIAGNOSIS — Z12.31 ENCOUNTER FOR SCREENING MAMMOGRAM FOR BREAST CANCER: ICD-10-CM

## 2021-05-28 PROCEDURE — 77063 BREAST TOMOSYNTHESIS BI: CPT

## 2021-06-04 ENCOUNTER — IMMUNIZATION (OUTPATIENT)
Dept: FAMILY PLANNING/WOMEN'S HEALTH CLINIC | Facility: IMMUNIZATION CENTER | Age: 46
End: 2021-06-04
Payer: COMMERCIAL

## 2021-06-04 DIAGNOSIS — Z23 ENCOUNTER FOR VACCINATION: Primary | ICD-10-CM

## 2021-06-04 PROCEDURE — 91300 PFIZER SARS-COV-2 VACCINE: CPT | Performed by: INTERNAL MEDICINE

## 2021-06-04 PROCEDURE — 0002A PFIZER SARS-COV-2 VACCINE: CPT | Performed by: INTERNAL MEDICINE

## 2021-06-10 ENCOUNTER — APPOINTMENT (OUTPATIENT)
Dept: MEDICAL GROUP | Facility: IMAGING CENTER | Age: 46
End: 2021-06-10
Payer: COMMERCIAL

## 2021-06-12 ENCOUNTER — HOSPITAL ENCOUNTER (OUTPATIENT)
Dept: LAB | Facility: MEDICAL CENTER | Age: 46
End: 2021-06-12
Attending: PHYSICIAN ASSISTANT
Payer: COMMERCIAL

## 2021-06-12 ENCOUNTER — PATIENT MESSAGE (OUTPATIENT)
Dept: MEDICAL GROUP | Facility: IMAGING CENTER | Age: 46
End: 2021-06-12

## 2021-06-12 DIAGNOSIS — N91.2 AMENORRHEA: ICD-10-CM

## 2021-06-12 DIAGNOSIS — Z79.4 TYPE 2 DIABETES MELLITUS WITHOUT COMPLICATION, WITH LONG-TERM CURRENT USE OF INSULIN (HCC): ICD-10-CM

## 2021-06-12 DIAGNOSIS — E11.9 TYPE 2 DIABETES MELLITUS WITHOUT COMPLICATION, WITH LONG-TERM CURRENT USE OF INSULIN (HCC): ICD-10-CM

## 2021-06-12 LAB
FSH SERPL-ACNC: 11.4 MIU/ML
LH SERPL-ACNC: 17.8 IU/L
PROGEST SERPL-MCNC: 0.31 NG/ML
TSH SERPL DL<=0.005 MIU/L-ACNC: 2.69 UIU/ML (ref 0.38–5.33)

## 2021-06-12 PROCEDURE — 82671 ASSAY OF ESTROGENS: CPT

## 2021-06-12 PROCEDURE — 83001 ASSAY OF GONADOTROPIN (FSH): CPT

## 2021-06-12 PROCEDURE — 84144 ASSAY OF PROGESTERONE: CPT

## 2021-06-12 PROCEDURE — 83002 ASSAY OF GONADOTROPIN (LH): CPT

## 2021-06-12 PROCEDURE — 36415 COLL VENOUS BLD VENIPUNCTURE: CPT

## 2021-06-12 PROCEDURE — 84443 ASSAY THYROID STIM HORMONE: CPT

## 2021-06-17 LAB
ESTRADIOL SERPL HS-MCNC: 3.5 PG/ML
ESTRONE SERPL-MCNC: 7.5 PG/ML

## 2021-06-18 ENCOUNTER — OFFICE VISIT (OUTPATIENT)
Dept: MEDICAL GROUP | Facility: IMAGING CENTER | Age: 46
End: 2021-06-18
Payer: COMMERCIAL

## 2021-06-18 ENCOUNTER — HOSPITAL ENCOUNTER (OUTPATIENT)
Facility: MEDICAL CENTER | Age: 46
End: 2021-06-18
Attending: PHYSICIAN ASSISTANT
Payer: COMMERCIAL

## 2021-06-18 VITALS
TEMPERATURE: 98.2 F | DIASTOLIC BLOOD PRESSURE: 68 MMHG | SYSTOLIC BLOOD PRESSURE: 114 MMHG | WEIGHT: 232 LBS | OXYGEN SATURATION: 94 % | RESPIRATION RATE: 16 BRPM | HEART RATE: 92 BPM | BODY MASS INDEX: 37.28 KG/M2 | HEIGHT: 66 IN

## 2021-06-18 DIAGNOSIS — Z79.4 TYPE 2 DIABETES MELLITUS WITHOUT COMPLICATION, WITH LONG-TERM CURRENT USE OF INSULIN (HCC): ICD-10-CM

## 2021-06-18 DIAGNOSIS — Z23 NEED FOR VACCINATION: ICD-10-CM

## 2021-06-18 DIAGNOSIS — N91.2 AMENORRHEA: ICD-10-CM

## 2021-06-18 DIAGNOSIS — E11.9 TYPE 2 DIABETES MELLITUS WITHOUT COMPLICATION, WITH LONG-TERM CURRENT USE OF INSULIN (HCC): ICD-10-CM

## 2021-06-18 LAB
CREAT UR-MCNC: 31.41 MG/DL
MICROALBUMIN UR-MCNC: <1.2 MG/DL
MICROALBUMIN/CREAT UR: NORMAL MG/G (ref 0–30)

## 2021-06-18 PROCEDURE — 90471 IMMUNIZATION ADMIN: CPT | Performed by: PHYSICIAN ASSISTANT

## 2021-06-18 PROCEDURE — 90472 IMMUNIZATION ADMIN EACH ADD: CPT | Performed by: PHYSICIAN ASSISTANT

## 2021-06-18 PROCEDURE — 90746 HEPB VACCINE 3 DOSE ADULT IM: CPT | Performed by: PHYSICIAN ASSISTANT

## 2021-06-18 PROCEDURE — 99214 OFFICE O/P EST MOD 30 MIN: CPT | Mod: 25 | Performed by: PHYSICIAN ASSISTANT

## 2021-06-18 PROCEDURE — 82570 ASSAY OF URINE CREATININE: CPT

## 2021-06-18 PROCEDURE — 90715 TDAP VACCINE 7 YRS/> IM: CPT | Performed by: PHYSICIAN ASSISTANT

## 2021-06-18 PROCEDURE — 82043 UR ALBUMIN QUANTITATIVE: CPT

## 2021-06-18 RX ORDER — EMPAGLIFLOZIN 10 MG/1
10 TABLET, FILM COATED ORAL DAILY
Qty: 90 TABLET | Refills: 1 | Status: SHIPPED | OUTPATIENT
Start: 2021-06-18 | End: 2021-08-26

## 2021-06-18 RX ORDER — INSULIN GLARGINE 100 [IU]/ML
18 INJECTION, SOLUTION SUBCUTANEOUS EVERY EVENING
Qty: 9 ML | Refills: 3
Start: 2021-06-18 | End: 2021-08-12

## 2021-06-18 ASSESSMENT — FIBROSIS 4 INDEX: FIB4 SCORE: 0.28

## 2021-06-18 ASSESSMENT — PAIN SCALES - GENERAL: PAINLEVEL: 3=SLIGHT PAIN

## 2021-06-18 NOTE — PROGRESS NOTES
Subjective:     CC:   Chief Complaint   Patient presents with   • Follow-Up   • Medication Management       HPI:   Marni presents today for follow up of diabetes    Type 2 diabetes mellitus without complication, with long-term current use of insulin ([facility])  Glucose-  Fastin-100  Last A1C 6.1  Tolerating Metformin 1000 mg BID. On Lantus 22 units qHS.    Amenorrhea  Pt had labs drawn to assess hormone levels. Still not having menstrual cycles, but she feels like she is going to start.      Past Medical History:   Diagnosis Date   • Brain lesion 2021   • Contusion 2011   • Hyponatremia 2021   • Migraine      Social History     Tobacco Use   • Smoking status: Former Smoker     Packs/day: 0.25     Years: 22.00     Pack years: 5.50     Types: Cigarettes     Quit date: 10/8/2010     Years since quitting: 10.7   • Smokeless tobacco: Never Used   • Tobacco comment: quit last hospital visit   Vaping Use   • Vaping Use: Never used   Substance Use Topics   • Alcohol use: Not Currently   • Drug use: No     Current Outpatient Medications Ordered in Epic   Medication Sig Dispense Refill   • Empagliflozin (JARDIANCE) 10 MG Tab Take 10 mg by mouth every day. 90 tablet 1   • insulin glargine (LANTUS SOLOSTAR) 100 UNIT/ML Solution Pen-injector injection Inject 18 Units under the skin every evening. 9 mL 3   • metformin (GLUCOPHAGE) 1000 MG tablet Take 1 tablet by mouth 2 times a day with meals for 90 days. 180 tablet 0   • atorvastatin (LIPITOR) 20 MG Tab Take 1 tablet by mouth at bedtime. 90 tablet 0   • Insulin Pen Needle 32 G x 4 mm Use as directed with insulin pen 120 Each 3   • levetiracetam (KEPPRA) 1000 MG tablet Take 1 Tab by mouth 2 Times a Day. 60 Tab 0   • Blood Glucose Monitoring Suppl (BLOOD GLUCOSE MONITOR SYSTEM) w/Device Kit Test blood sugar as recommended by provider 1 Kit 0   • glucose blood strip Use one strip to test blood sugar three times daily 100 Strip 0   • Lancets Use one One Touch Verio  "Flex lancet to test blood sugar three times daily. 100 Each 0   • acetaminophen (TYLENOL) 500 MG Tab Take 1,000 mg by mouth 2 times a day as needed for Moderate Pain.     • Ascorbic Acid (VITAMIN C) 1000 MG Tab Take 1 Tab by mouth every day.     • Multiple Vitamins-Minerals (OCUVITE PO) Take 1 Tab by mouth every day.       No current [facility]-ordered facility-administered medications on file.     Amoxicillin, Food, and Potassium    Health Maintenance: Completed    ROS:  Gen: no fevers/chills, no changes in weight  Eyes: no changes in vision  Pulm: no sob, no cough  CV: no chest pain, no palpitations, no edema  GI: no nausea/vomiting, no diarrhea  Neuro: no headaches, no numbness/tingling    Objective:   Exam:  /68 (BP Location: Left arm, Patient Position: Sitting, BP Cuff Size: Adult)   Pulse 92   Temp 36.8 °C (98.2 °F) (Temporal)   Resp 16   Ht 1.676 m (5' 6\")   Wt 105 kg (232 lb)   SpO2 94%   BMI 37.45 kg/m²    Body mass index is 37.45 kg/m².    Gen: Alert and oriented, No apparent distress.  HEENT: Head atraumatic, normocephalic. Pupils equal and round.  Neck: Neck is supple without lymphadenopathy.   Lungs: Normal effort, CTA bilaterally, no wheezes, rhonchi, or rales  CV: Regular rate and rhythm. No murmurs, rubs, or gallops.  Ext: No clubbing, cyanosis, edema.  Assessment & Plan:     45 y.o. female with the following -     1. Type 2 diabetes mellitus without complication, with long-term current use of insulin ([facility])  Chronic, improving. Decrease Lantus to 18 units qHS and start Jardiance 10 mg. A1C and lipids in 8/21. Umicro. Obtain last ophthalm report.   - Microalbumin Creat Ratio Urine (Clinic Collect); Future  - Empagliflozin (JARDIANCE) 10 MG Tab; Take 10 mg by mouth every day.  Dispense: 90 tablet; Refill: 1  - insulin glargine (LANTUS SOLOSTAR) 100 UNIT/ML Solution Pen-injector injection; Inject 18 Units under the skin every evening.  Dispense: 9 mL; Refill: 3    2. Amenorrhea  Continue to " monitor    3. Need for vaccination  - Tdap Vaccine =>8YO IM  - Hepatitis B Vaccine Adult 20+ -- next dose at next appt in August    Return in about 7 weeks (around 8/5/2021).    Barbara Knapp PA-C ([place])  Physician Assistant Certified  [facility]    Please note that this dictation was created using voice recognition software. I have made every reasonable attempt to correct obvious errors, but I expect that there are errors of grammar and possibly content that I did not discover before finalizing the note.

## 2021-06-19 NOTE — ASSESSMENT & PLAN NOTE
Pt had labs drawn to assess hormone levels. Still not having menstrual cycles, but she feels like she is going to start.

## 2021-06-22 ENCOUNTER — TELEPHONE (OUTPATIENT)
Dept: MEDICAL GROUP | Facility: IMAGING CENTER | Age: 46
End: 2021-06-22

## 2021-07-06 ENCOUNTER — HOSPITAL ENCOUNTER (OUTPATIENT)
Dept: RADIOLOGY | Facility: MEDICAL CENTER | Age: 46
End: 2021-07-06
Attending: PHYSICIAN ASSISTANT
Payer: COMMERCIAL

## 2021-07-06 DIAGNOSIS — N91.2 AMENORRHEA: ICD-10-CM

## 2021-07-06 PROCEDURE — 76830 TRANSVAGINAL US NON-OB: CPT

## 2021-07-08 ENCOUNTER — OFFICE VISIT (OUTPATIENT)
Dept: PHYSICAL MEDICINE AND REHAB | Facility: REHABILITATION | Age: 46
End: 2021-07-08
Payer: COMMERCIAL

## 2021-07-08 VITALS
OXYGEN SATURATION: 97 % | RESPIRATION RATE: 18 BRPM | DIASTOLIC BLOOD PRESSURE: 60 MMHG | SYSTOLIC BLOOD PRESSURE: 100 MMHG | HEART RATE: 90 BPM | TEMPERATURE: 97.4 F

## 2021-07-08 DIAGNOSIS — R47.01 EXPRESSIVE APHASIA: ICD-10-CM

## 2021-07-08 DIAGNOSIS — D32.9 MENINGIOMA (HCC): Primary | ICD-10-CM

## 2021-07-08 DIAGNOSIS — Z87.898 HISTORY OF SEIZURES: ICD-10-CM

## 2021-07-08 PROCEDURE — 99212 OFFICE O/P EST SF 10 MIN: CPT | Performed by: PHYSICAL MEDICINE & REHABILITATION

## 2021-07-08 NOTE — PROGRESS NOTES
[facility]  [facility]  [address] [place] [address]  Ph: [phone]    FOLLOW UP PATIENT EVALUATION    Patient Name: Marni Muñoz   Patient : 1975  Patient Age: 45 y.o.     Examining Physician: Dr. Jaclyn Shepard DO    SUBJECTIVE:   Patient Identification: Marni Muñoz is a 45 y.o. female with PMH significant for migraines and rehabilitation history significant for meningioma s/p craniotomy for resection 2021 and is presenting to PM&R clinic for a FOLLOW UP OUTPATIENT evaluation with the following chief complaint/s:    Chief Complaint: Paperwork.    History of Present Illness:    - Neurology records reviewed from date 2021: Dr. Abdi cleared to return to driving with OT evaluation.  Remains on Keppra 1 g twice daily for seizure prevention.  - Presented for disability paperwork, but not needed at this time.   - Saw retinal specialist, no surgery needed.   - Will have driving eval upcoming.   - Will start trying to learn Guatemalan.   - Still with some left weakness.   - Handwriting back to normal.   - Has been cooking again with new recipes.     Review of Systems:  All other pertinent positive review of systems are noted above in HPI.   All other systems reviewed and are negative.    Past Medical History:  Past Medical History:   Diagnosis Date   • Hyponatremia 2021   • Brain lesion 2021   • Contusion 2011   • Migraine       Past Surgical History:   Procedure Laterality Date   • CRANIOTOMY STEALTH Left 2021    Procedure: CRANIOTOMY, USING FRAMELESS STEREOTAXY-FOR TUMOR;  Surgeon: Luke Soto M.D.;  Location: SURGERY [facility];  Service: Neurosurgery   • CHOLECYSTECTOMY     • OTHER ORTHOPEDIC SURGERY      L meniscus and ACL repair        Current Outpatient Medications:   •  Empagliflozin (JARDIANCE) 10 MG Tab, Take 10 mg by mouth every day., Disp: 90 tablet, Rfl: 1  •  insulin glargine (LANTUS SOLOSTAR) 100 UNIT/ML Solution  Pen-injector injection, Inject 18 Units under the skin every evening., Disp: 9 mL, Rfl: 3  •  metformin (GLUCOPHAGE) 1000 MG tablet, Take 1 tablet by mouth 2 times a day with meals for 90 days., Disp: 180 tablet, Rfl: 0  •  atorvastatin (LIPITOR) 20 MG Tab, Take 1 tablet by mouth at bedtime., Disp: 90 tablet, Rfl: 0  •  Insulin Pen Needle 32 G x 4 mm, Use as directed with insulin pen, Disp: 120 Each, Rfl: 3  •  levetiracetam (KEPPRA) 1000 MG tablet, Take 1 Tab by mouth 2 Times a Day., Disp: 60 Tab, Rfl: 0  •  Blood Glucose Monitoring Suppl (BLOOD GLUCOSE MONITOR SYSTEM) w/Device Kit, Test blood sugar as recommended by provider, Disp: 1 Kit, Rfl: 0  •  glucose blood strip, Use one strip to test blood sugar three times daily, Disp: 100 Strip, Rfl: 0  •  Lancets, Use one One Touch Verio Flex lancet to test blood sugar three times daily., Disp: 100 Each, Rfl: 0  •  acetaminophen (TYLENOL) 500 MG Tab, Take 1,000 mg by mouth 2 times a day as needed for Moderate Pain., Disp: , Rfl:   •  Ascorbic Acid (VITAMIN C) 1000 MG Tab, Take 1 Tab by mouth every day., Disp: , Rfl:   •  Multiple Vitamins-Minerals (OCUVITE PO), Take 1 Tab by mouth every day., Disp: , Rfl:   Allergies   Allergen Reactions   • Amoxicillin Rash   • Food Vomiting     Bananas and marijuana   • Potassium      Itchy and swollen lips        Past Social History:  Social History     Socioeconomic History   • Marital status: Single     Spouse name: Not on file   • Number of children: Not on file   • Years of education: Not on file   • Highest education level: Not on file   Occupational History   • Not on file   Tobacco Use   • Smoking status: Former Smoker     Packs/day: 0.25     Years: 22.00     Pack years: 5.50     Types: Cigarettes     Quit date: 10/8/2010     Years since quitting: 10.7   • Smokeless tobacco: Never Used   • Tobacco comment: quit last hospital visit   Vaping Use   • Vaping Use: Never used   Substance and Sexual Activity   • Alcohol use: Not  Currently   • Drug use: No   • Sexual activity: Not Currently   Other Topics Concern   • Not on file   Social History Narrative   • Not on file     Social Determinants of Health     Financial Resource Strain:    • Difficulty of Paying Living Expenses:    Food Insecurity:    • Worried About Running Out of Food in the Last Year:    • Ran Out of Food in the Last Year:    Transportation Needs:    • Lack of Transportation (Medical):    • Lack of Transportation (Non-Medical):    Physical Activity:    • Days of Exercise per Week:    • Minutes of Exercise per Session:    Stress:    • Feeling of Stress :    Social Connections:    • Frequency of Communication with Friends and Family:    • Frequency of Social Gatherings with Friends and Family:    • Attends Yazdanism Services:    • Active Member of Clubs or Organizations:    • Attends Club or Organization Meetings:    • Marital Status:    Intimate Partner Violence:    • Fear of Current or Ex-Partner:    • Emotionally Abused:    • Physically Abused:    • Sexually Abused:         Family History:  Family History   Problem Relation Age of Onset   • Arterial Aneurysm Father    • Cancer Maternal Grandmother         breast cancer       Depression and Opioid Screening  PHQ-9:  Depression Screen (PHQ-2/PHQ-9) 2/3/2021 2/4/2021 5/4/2021   PHQ-2 Total Score 0 0 -   PHQ-2 Total Score - - 0   PHQ-9 Total Score - 0 -     Interpretation of PHQ-9 Total Score   Score Severity   1-4 No Depression   5-9 Mild Depression   10-14 Moderate Depression   15-19 Moderately Severe Depression   20-27 Severe Depression     OBJECTIVE:   Vital Signs:  Vitals:    07/08/21 1641   BP: 100/60   Pulse: 90   Resp: 18   Temp: 36.3 °C (97.4 °F)   SpO2: 97%        Pertinent Labs:  Lab Results   Component Value Date/Time    SODIUM 141 05/03/2021 09:35 AM    POTASSIUM 4.3 05/03/2021 09:35 AM    CHLORIDE 107 05/03/2021 09:35 AM    CO2 23 05/03/2021 09:35 AM    GLUCOSE 145 (H) 05/03/2021 09:35 AM    BUN 12 05/03/2021  09:35 AM    CREATININE 0.52 05/03/2021 09:35 AM       Lab Results   Component Value Date/Time    HBA1C 6.1 (A) 05/04/2021 01:24 PM       Lab Results   Component Value Date/Time    WBC 7.4 05/03/2021 09:35 AM    RBC 4.33 05/03/2021 09:35 AM    HEMOGLOBIN 13.2 05/03/2021 09:35 AM    HEMATOCRIT 40.5 05/03/2021 09:35 AM    MCV 93.5 05/03/2021 09:35 AM    MCH 30.5 05/03/2021 09:35 AM    MCHC 32.6 (L) 05/03/2021 09:35 AM    MPV 10.9 05/03/2021 09:35 AM    NEUTSPOLYS 49.30 05/03/2021 09:35 AM    LYMPHOCYTES 40.20 05/03/2021 09:35 AM    MONOCYTES 6.80 05/03/2021 09:35 AM    EOSINOPHILS 2.60 05/03/2021 09:35 AM    BASOPHILS 0.80 05/03/2021 09:35 AM       Lab Results   Component Value Date/Time    ASTSGOT 7 (L) 05/03/2021 09:35 AM    ALTSGPT 10 05/03/2021 09:35 AM        Physical Exam:   GEN: No apparent distress  HEENT: Head normocephalic, atraumatic.  Sclera nonicteric bilaterally, no ocular discharge appreciated bilaterally.  PULMONARY: Breathing nonlabored on room air, no respiratory accessory muscle use.  Not requiring supplemental oxygen.  SKIN: No appreciable skin breakdown on exposed areas of skin.  PSYCH: Mood and affect within normal limits.  NEURO: Awake alert.  Conversational.  Logical thought content. Ambulatory without assistive device.    ASSESSMENT/PLAN: Marni Muñoz  is a 45 y.o. female with rehabilitation history significant for meningioma s/p craniotomy for resection 1/29/2021, here for evaluation. The following plan was discussed with the patient who is in agreement.     Visit Diagnoses     ICD-10-CM   1. Meningioma (HCC)  D32.9   2. History of seizures  Z87.898   3. Expressive aphasia  R47.01      Friend assists with history.    Rehab/Neuro:   1. Meningioma s/p craniotomy for resection 1/29/2021  2. Subclinical seizures: On Keppra. Off Vimpat.   3. Hyponatremia secondary to SIADH.  Off of salt tabs. Resolved.   - Therapy: Continue learning new activities.   -Driving status: Recommend driving evaluation,  will be scheduled soon.   -Neuropsychology: Established with Dr. Medrano.  Will be seen again in late June.  Hold on return to work for now, needs continued cognitive therapy.  -Function: Stable.   -Consultants: Neurosurgery, neurology, retinal specialist  -Occupation: Released from work.     Follow up: Nov 2011    Total time spent was 14 minutes.  Included in this time is the time spent preparing for the visit including record review, my exam and evaluation, counseling and education regarding that which is aforementioned in the assessment and plan. Included this time as the time spent obtaining history from someone other than the patient. Discussion involved the patient and friend.    Please note that this dictation was created using voice recognition software. I have made every reasonable attempt to correct obvious errors but there may be errors of grammar and content that I may have overlooked prior to finalization of this note.    Dr. Jaclyn Shepard DO, MS  Department of Physical Medicine & Rehabilitation  Neuro Rehabilitation Clinic  [facility]

## 2021-08-11 DIAGNOSIS — E11.9 TYPE 2 DIABETES MELLITUS WITHOUT COMPLICATION, WITH LONG-TERM CURRENT USE OF INSULIN (HCC): ICD-10-CM

## 2021-08-11 DIAGNOSIS — Z79.4 TYPE 2 DIABETES MELLITUS WITHOUT COMPLICATION, WITH LONG-TERM CURRENT USE OF INSULIN (HCC): ICD-10-CM

## 2021-08-12 RX ORDER — INSULIN GLARGINE 100 [IU]/ML
INJECTION, SOLUTION SUBCUTANEOUS
Qty: 9 ML | Refills: 3 | Status: SHIPPED | OUTPATIENT
Start: 2021-08-12 | End: 2021-08-26

## 2021-08-17 ENCOUNTER — PATIENT MESSAGE (OUTPATIENT)
Dept: MEDICAL GROUP | Facility: IMAGING CENTER | Age: 46
End: 2021-08-17

## 2021-08-17 DIAGNOSIS — Z79.4 TYPE 2 DIABETES MELLITUS WITHOUT COMPLICATION, WITH LONG-TERM CURRENT USE OF INSULIN (HCC): ICD-10-CM

## 2021-08-17 DIAGNOSIS — E11.9 TYPE 2 DIABETES MELLITUS WITHOUT COMPLICATION, WITH LONG-TERM CURRENT USE OF INSULIN (HCC): ICD-10-CM

## 2021-08-18 RX ORDER — ATORVASTATIN CALCIUM 20 MG/1
20 TABLET, FILM COATED ORAL
Qty: 90 TABLET | Refills: 0 | Status: SHIPPED | OUTPATIENT
Start: 2021-08-18 | End: 2021-11-18

## 2021-08-18 NOTE — TELEPHONE ENCOUNTER
From: Marni Muñoz  To: Physician Assistant Barbara Knapp  Sent: 8/17/2021 11:07 PM PDT  Subject: Prescription for Atorvastatin 20mg    Good evening Dr. Knapp    Hope this message finds you well. I was just reaching out as I had an appointment set with you on 8/20 but it has changed to 8/26. My last dosage for my atorvastatin will be this coming Thursday 8/19 and do not have any refills left. What should I do? Please let me know. May someone please call the pharmacy to refill? If you have any questions, please feel free to call me      Best    Marni

## 2021-08-20 ENCOUNTER — OCCUPATIONAL THERAPY (OUTPATIENT)
Dept: OCCUPATIONAL THERAPY | Facility: REHABILITATION | Age: 46
End: 2021-08-20
Attending: SPECIALIST
Payer: COMMERCIAL

## 2021-08-20 DIAGNOSIS — G40.111: ICD-10-CM

## 2021-08-20 PROCEDURE — 97750 PHYSICAL PERFORMANCE TEST: CPT

## 2021-08-20 ASSESSMENT — BALANCE ASSESSMENTS
BALANCE - STANDING DYNAMIC: NORMAL
BALANCE - SITTING DYNAMIC: NORMAL
BALANCE - STANDING STATIC: NORMAL
BALANCE - SITTING STATIC: NORMAL

## 2021-08-20 NOTE — OP THERAPY EVALUATION
Outpatient Occupational Therapy  DRIVING EVALUATION    [facility] Occupational Therapy [address]  [address]  [place] [address]  Phone:  [phone]  Fax:  [phone]    Date of Evaluation: 08/20/2021  Name of Evaluator: Juan Pablo Montague MS,OTR/L    Background  Patient Name: Marni Muñoz  YOB: 1975 Age: 46 y.o. Sex: female      Referring Provider: PADMINI Abdi M.D.  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 She reports she was referred for driving evaluation since her drivers license was suspended after her seizure she suffered 3 hours s/p craniotomy for resection 1/29/2021.     Driving Evaluation     Vehicle/ Details:     Make: Anjali    Model: McArthur    Year: 1999    Features: automatic and power steering    Comments: Client reports she does not have an active [place] drivers license d/t seizure.     Physical Assessment:   Auditory:     Hearing aids: no hearing aid    Hearing problems: no hearing problem    Range of Motion:     Upper extremity (left): within functional limits    Upper extremity (right): within functional limits    Lower extremity (left): within functional limits    Lower extremity (right): within functional limits    Cervical neck (left): within functional limits    Cervical neck (right): within functional limits    Thoracic rotation (left): within functional limits    Thoracic rotation (right): within functional limits    Strength:     Upper extremity (left): within functional limits    Upper extremity (right): within functional limits    Lower extremity (left): within functional limits    Lower extremity (right): within functional limits      (left): within functional limits     (right): within functional limits    Coordination:     Upper extremity (left): within functional limits        Finger to finger: within functional limits    Upper extremity (right): within functional limits        Finger to finger: within functional limits    Lower extremity (left): within functional limits        Heel to shin: within functional limits    Lower extremity (right): within functional limits        Heel to shin: within functional limits    Sensation:   Upper extremity (left):    Light touch: intact    Proprioception: intact   Upper extremity (right):    Light touch: intact    Proprioception: intact   Lower extremity (left):    Light touch: intact (Hypersensitivity in feet)    Proprioception: intact   Lower extremity (right):    Light touch: intact    Proprioception: intact     Balance:     Sitting (static): Normal    Sitting (dynamic): Normal    Standing (static): Normal    Standing (dynamic): Normal    Sit to stand: independent    Rapid Pace Walk Test: 7 sec    Cognition:     [facility] Mental Examination (UMS): 28/30    Trail Making Test B: 72 sec    Sign Identification: 10/10    Vision:     Last eye exam: 6/20/2021    Previous eye problems: (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)    Previous eye treatments: corrective lenses    Corrective lens type: distance glasses    Corrective lens used during: daytime and nighttime    Near acuity (Snellen Chart) Binocular: 30    Far acuity (Snellen Chart) Binocular: 30    Contrast sensitivity (day): adequate    Contrast sensitivity (night): adequate    Depth perception: diminished    Color vision: intact    MVPT-3 Visual Closure Subset:        Correct answers: (ex) (A), 22 (B), 23 (A), 24 (B), 25 (C), 26 (B), 27 (D), 28 (A), 29 (D), 30 (C), 31 (D), 32 (A), 33 (C) and 34 (D) (Completed in 92 seconds)        Score: 14/13        Accuracy: 107.69% correct        Pass/Fail:  "pass    Additional Physical Assessment Notes:      Full ocular ROM.  Smooth pursuits, saccades, and convergence WNL.  Peripheral vision: 85 degrees each eye for a total of 170 degrees of arc.    Driving Simulator Tasks:   Motor Skills:     Using the accelerator: safe    Using the brake: safe    Using the steering wheel: safe    Reaction time to applying the brake: pass        Comments: Client able to maintain speed and stay in her rosendo and stop at signal. Reaction time 1.0 seconds.    Following distance 3-4 sec rule: pass        Comments: Client maintained a safe distance between herself and the car in front of her. She was able to maintain the appropriate speed and stayed in her rosendo throughout simulation.     Configurable Crash Avoidance Scenarios:     Scenario 1:     Country road-head on collisions    Visibility: good visibilty and no rain    Pass/Fail: pass (Client was able avoid striking vehicle while maintaining the appropriate speed and staying in her rosendo.)      Scenario 2:     Country road- late threat recognition     Visibility: good visibility and no rain     Pass/Fail: pass      Scenario 3:     Country road- rear end collisions    Visibility: Poor visibility and light rain (could not see far enough to pass)    Pass/Fail: pass (Client chose not to pass motorcycle and reported \"I didnt want to pass around the corner and could not see far enough d/t rain\".)      Scenario 4:     City road- avoiding pedestrians     Visibility: good visibilty and night time (didnt cross on double solid lines) stopped at red light    Pass/Fail: fail (Client sucessfully stopped at red light, however was not able to avoid striking car that made left hand turn on red light.)    Dividing and Focusing Attention:     Scenario 1:     Country road, good visiblity, no rain    Pass/Fail: pass    Comments: Client maintained the appropriate speed and stayed in her rosendo. She was able to avoid striking deer coming from the left field of " "view.       Scenario 2:     City road, good visilibilty, no rain    Pass/Fail: pass    Comments: Client was able to change lanes, turn, and avoid striking pedestrian. She maintain the appropriate speed and stayed in her rosendo.      Free Driving Scenario 1:    country road, good visibility, no rain    Comments: Client was able to maintain the appropriate speed and stay in rosendo during free driving.          Evaluation:     Pass/Fail: pass    Evaluation Comments: The objective findings indicate that Ms. Marni Muñoz has the physical, visual, visual-perceptual, and cognitive skills necessary to safely operate a vehicle.  She exhibited adequate reaction times and good safety distances with all simulator tasks.  In both rural and city settings where there were mild to moderate distractions, she had only one accident in multiple crash avoidance scenarios with pedestrians, animals, vehicles, or stationary objects.   She obeyed all regulatory signs, speed limits, maintained mid-rosendo position at all times, followed all verbal directions, safely passed other vehicles, and was able to divide and focus her attention throughout the driving simulation without difficulty.  Overall, Ms. Muñoz demonstrated good safety awareness, judgement, and anticipatory skills.  Based on her performance today, I recommend she transition back to driving under the following conditions: during the day, good weather conditions, at low traffic times, on familiar routes, avoidance of the \"spaghetti bowl\", minimize distractions, and with a passenger during her first 5 drives to provide her with \"on-the-road\" feedback.  Ms. Muñoz was in full agreement with these recommendations.    Operating a motor vehicle is a privilege in the [facility].  When a medical condition interferes with a person's ability to drive safely, it is the responsibility of the physician to approve driving or revoke the patient's license.  This test was not an on-the-road " driving evaluation.  It was intended to identify the physical, visual, perceptual and cognitive deficits that may impair an individual's ability to safely operate a motor vehicle.    Please contact me with any questions regarding this case at [facility] at [phone].  Thank you for this referral and the safety concerns for your patients and others.    Sincerely,    Juan Pablo CARD, VERNONR/L      Referring provider co-signature:  I have reviewed this evaluation and my co-signature certifies my agreement with the contents.    Physician Signature: ________________________________ Date: ______________

## 2021-08-26 ENCOUNTER — OFFICE VISIT (OUTPATIENT)
Dept: MEDICAL GROUP | Facility: IMAGING CENTER | Age: 46
End: 2021-08-26
Payer: COMMERCIAL

## 2021-08-26 VITALS
HEIGHT: 66 IN | SYSTOLIC BLOOD PRESSURE: 102 MMHG | HEART RATE: 92 BPM | TEMPERATURE: 98.2 F | WEIGHT: 223 LBS | BODY MASS INDEX: 35.84 KG/M2 | OXYGEN SATURATION: 95 % | DIASTOLIC BLOOD PRESSURE: 72 MMHG

## 2021-08-26 DIAGNOSIS — E66.01 CLASS 2 SEVERE OBESITY DUE TO EXCESS CALORIES WITH SERIOUS COMORBIDITY AND BODY MASS INDEX (BMI) OF 35.0 TO 35.9 IN ADULT (HCC): ICD-10-CM

## 2021-08-26 DIAGNOSIS — Z23 NEED FOR VACCINATION: ICD-10-CM

## 2021-08-26 DIAGNOSIS — E11.9 TYPE 2 DIABETES MELLITUS WITHOUT COMPLICATION, WITH LONG-TERM CURRENT USE OF INSULIN (HCC): ICD-10-CM

## 2021-08-26 DIAGNOSIS — R63.4 WEIGHT LOSS: ICD-10-CM

## 2021-08-26 DIAGNOSIS — Z79.4 TYPE 2 DIABETES MELLITUS WITHOUT COMPLICATION, WITH LONG-TERM CURRENT USE OF INSULIN (HCC): ICD-10-CM

## 2021-08-26 PROBLEM — Z72.0 TOBACCO USE: Status: RESOLVED | Noted: 2021-03-01 | Resolved: 2021-08-26

## 2021-08-26 PROBLEM — N91.2 AMENORRHEA: Status: RESOLVED | Noted: 2021-05-21 | Resolved: 2021-08-26

## 2021-08-26 PROBLEM — E66.09 OBESITY DUE TO EXCESS CALORIES WITH SERIOUS COMORBIDITY: Status: ACTIVE | Noted: 2021-08-26

## 2021-08-26 LAB
HBA1C MFR BLD: 6 % (ref 0–5.6)
INT CON NEG: ABNORMAL
INT CON POS: ABNORMAL

## 2021-08-26 PROCEDURE — 90472 IMMUNIZATION ADMIN EACH ADD: CPT | Performed by: PHYSICIAN ASSISTANT

## 2021-08-26 PROCEDURE — 99214 OFFICE O/P EST MOD 30 MIN: CPT | Mod: 25 | Performed by: PHYSICIAN ASSISTANT

## 2021-08-26 PROCEDURE — 90746 HEPB VACCINE 3 DOSE ADULT IM: CPT | Performed by: PHYSICIAN ASSISTANT

## 2021-08-26 PROCEDURE — 90471 IMMUNIZATION ADMIN: CPT | Performed by: PHYSICIAN ASSISTANT

## 2021-08-26 PROCEDURE — 90732 PPSV23 VACC 2 YRS+ SUBQ/IM: CPT | Performed by: PHYSICIAN ASSISTANT

## 2021-08-26 PROCEDURE — 83036 HEMOGLOBIN GLYCOSYLATED A1C: CPT | Performed by: PHYSICIAN ASSISTANT

## 2021-08-26 RX ORDER — INSULIN GLARGINE 100 [IU]/ML
10 INJECTION, SOLUTION SUBCUTANEOUS DAILY
Qty: 3 ML | Refills: 0
Start: 2021-08-26 | End: 2021-09-25

## 2021-08-26 RX ORDER — EMPAGLIFLOZIN 25 MG/1
25 TABLET, FILM COATED ORAL DAILY
Qty: 30 TABLET | Refills: 3 | Status: SHIPPED | OUTPATIENT
Start: 2021-08-26 | End: 2021-11-29

## 2021-08-26 ASSESSMENT — FIBROSIS 4 INDEX: FIB4 SCORE: 0.28

## 2021-08-26 ASSESSMENT — PAIN SCALES - GENERAL: PAINLEVEL: NO PAIN

## 2021-08-27 NOTE — ASSESSMENT & PLAN NOTE
Blood sugar at home ranging from 100-130.  Currently on Lantus 18 units nightly.  Also on Jardiance 10 mg daily and Metformin 1000 mg twice a day.  She is trying to lose weight.  She is down 10 pounds.  On statin.  Due for lipid panel.

## 2021-08-27 NOTE — ASSESSMENT & PLAN NOTE
Patient with intentional weight loss.  She is tolerating Jardiance.  Trying to increase her activity level.

## 2021-09-09 DIAGNOSIS — E11.9 TYPE 2 DIABETES MELLITUS WITHOUT COMPLICATION, WITH LONG-TERM CURRENT USE OF INSULIN (HCC): ICD-10-CM

## 2021-09-09 DIAGNOSIS — Z79.4 TYPE 2 DIABETES MELLITUS WITHOUT COMPLICATION, WITH LONG-TERM CURRENT USE OF INSULIN (HCC): ICD-10-CM

## 2021-09-15 ENCOUNTER — APPOINTMENT (OUTPATIENT)
Dept: OCCUPATIONAL THERAPY | Facility: REHABILITATION | Age: 46
End: 2021-09-15
Attending: SPECIALIST
Payer: COMMERCIAL

## 2021-11-11 ENCOUNTER — OFFICE VISIT (OUTPATIENT)
Dept: PHYSICAL MEDICINE AND REHAB | Facility: REHABILITATION | Age: 46
End: 2021-11-11
Payer: COMMERCIAL

## 2021-11-11 VITALS
RESPIRATION RATE: 18 BRPM | TEMPERATURE: 98.6 F | OXYGEN SATURATION: 98 % | HEART RATE: 100 BPM | DIASTOLIC BLOOD PRESSURE: 60 MMHG | SYSTOLIC BLOOD PRESSURE: 104 MMHG

## 2021-11-11 DIAGNOSIS — D32.9 MENINGIOMA (HCC): Primary | ICD-10-CM

## 2021-11-11 DIAGNOSIS — R47.01 EXPRESSIVE APHASIA: ICD-10-CM

## 2021-11-11 DIAGNOSIS — R45.89 DEPRESSED MOOD: ICD-10-CM

## 2021-11-11 DIAGNOSIS — R41.3 IMPAIRMENT OF SHORT-TERM AND LONG-TERM MEMORY: ICD-10-CM

## 2021-11-11 PROCEDURE — 99215 OFFICE O/P EST HI 40 MIN: CPT | Performed by: PHYSICAL MEDICINE & REHABILITATION

## 2021-11-11 NOTE — PROGRESS NOTES
[facility]  [facility]  [address] [place] [address]  Ph: [phone]    FOLLOW UP PATIENT EVALUATION    Patient Name: Marni Muñoz   Patient : 1975  Patient Age: 46 y.o.     Examining Physician: Dr. Jaclyn Shepadr DO    SUBJECTIVE:   Patient Identification: Marni Muñoz is a 46 y.o. female with PMH significant for migraines and rehabilitation history significant for meningioma s/p craniotomy for resection 2021 and is presenting to PM&R clinic for a FOLLOW UP OUTPATIENT evaluation with the following chief complaint/s:    Chief Complaint: Brain Injury Follow Up    History of Present Illness:    - Has started a business of making cupcakes, knitting.   - Has done research of her meningioma.   - Has had a lot of depression as friend is not in her life at this time.   - Is managing her depression okay for now. Has psychology support if needed.   - Her hair has changed its type.   - Has no issues driving at all.   - Still not lifting anything heavy, asks for help.   - Scar has healed well.   - Is still very active with different new tasks.   - Has had trouble learning Romanian. Going back to learning Persian.   - When she sleeps, sleeps well. Usually gets 6 hrs which works for her.   - Still napping quite often.   - Will see Dr. Soto upcoming. Sees Neurology as well.   - Off of fast acting insulin.   - Has changed diet a lot to more Mediterranean.     Review of Systems:  All other pertinent positive review of systems are noted above in HPI.   All other systems reviewed and are negative.    Past Medical History:  Past Medical History:   Diagnosis Date   • Amenorrhea 2021   • Tobacco use 3/1/2021   • Hyponatremia 2021   • Brain lesion 2021   • Contusion 2011   • Migraine       Past Surgical History:   Procedure Laterality Date   • CRANIOTOMY STEALTH Left 2021    Procedure: CRANIOTOMY, USING FRAMELESS STEREOTAXY-FOR TUMOR;  Surgeon: Luke Soto,  M.D.;  Location: SURGERY [facility];  Service: Neurosurgery   • CHOLECYSTECTOMY     • OTHER ORTHOPEDIC SURGERY      L meniscus and ACL repair        Current Outpatient Medications:   •  metformin (GLUCOPHAGE) 1000 MG tablet, TAKE 1 TABLET BY MOUTH TWICE DAILY WITH MEALS, Disp: 180 Tablet, Rfl: 0  •  Empagliflozin (JARDIANCE) 25 MG Tab, Take 25 mg by mouth every day., Disp: 30 Tablet, Rfl: 3  •  atorvastatin (LIPITOR) 20 MG Tab, Take 1 Tablet by mouth at bedtime., Disp: 90 Tablet, Rfl: 0  •  Insulin Pen Needle 32 G x 4 mm, Use as directed with insulin pen, Disp: 120 Each, Rfl: 3  •  levetiracetam (KEPPRA) 1000 MG tablet, Take 1 Tab by mouth 2 Times a Day., Disp: 60 Tab, Rfl: 0  •  Blood Glucose Monitoring Suppl (BLOOD GLUCOSE MONITOR SYSTEM) w/Device Kit, Test blood sugar as recommended by provider, Disp: 1 Kit, Rfl: 0  •  glucose blood strip, Use one strip to test blood sugar three times daily, Disp: 100 Strip, Rfl: 0  •  Lancets, Use one One Touch Verio Flex lancet to test blood sugar three times daily., Disp: 100 Each, Rfl: 0  •  acetaminophen (TYLENOL) 500 MG Tab, Take 1,000 mg by mouth 2 times a day as needed for Moderate Pain., Disp: , Rfl:   •  Ascorbic Acid (VITAMIN C) 1000 MG Tab, Take 1 Tab by mouth every day., Disp: , Rfl:   •  Multiple Vitamins-Minerals (OCUVITE PO), Take 1 Tab by mouth every day., Disp: , Rfl:   Allergies   Allergen Reactions   • Amoxicillin Rash   • Food Vomiting     Bananas and marijuana   • Potassium      Itchy and swollen lips        Past Social History:  Social History     Socioeconomic History   • Marital status: Single     Spouse name: Not on file   • Number of children: Not on file   • Years of education: Not on file   • Highest education level: Not on file   Occupational History   • Not on file   Tobacco Use   • Smoking status: Former Smoker     Packs/day: 0.25     Years: 22.00     Pack years: 5.50     Types: Cigarettes     Quit date: 10/8/2010     Years since quittin.1    • Smokeless tobacco: Never Used   • Tobacco comment: quit last hospital visit   Vaping Use   • Vaping Use: Never used   Substance and Sexual Activity   • Alcohol use: Not Currently   • Drug use: No   • Sexual activity: Not Currently   Other Topics Concern   • Not on file   Social History Narrative   • Not on file     Social Determinants of Health     Financial Resource Strain:    • Difficulty of Paying Living Expenses: Not on file   Food Insecurity:    • Worried About Running Out of Food in the Last Year: Not on file   • Ran Out of Food in the Last Year: Not on file   Transportation Needs:    • Lack of Transportation (Medical): Not on file   • Lack of Transportation (Non-Medical): Not on file   Physical Activity:    • Days of Exercise per Week: Not on file   • Minutes of Exercise per Session: Not on file   Stress:    • Feeling of Stress : Not on file   Social Connections:    • Frequency of Communication with Friends and Family: Not on file   • Frequency of Social Gatherings with Friends and Family: Not on file   • Attends Sabianist Services: Not on file   • Active Member of Clubs or Organizations: Not on file   • Attends Club or Organization Meetings: Not on file   • Marital Status: Not on file   Intimate Partner Violence:    • Fear of Current or Ex-Partner: Not on file   • Emotionally Abused: Not on file   • Physically Abused: Not on file   • Sexually Abused: Not on file   Housing Stability:    • Unable to Pay for Housing in the Last Year: Not on file   • Number of Places Lived in the Last Year: Not on file   • Unstable Housing in the Last Year: Not on file      Family History:  Family History   Problem Relation Age of Onset   • Arterial Aneurysm Father    • Cancer Maternal Grandmother         breast cancer     Depression and Opioid Screening  PHQ-9:  Depression Screen (PHQ-2/PHQ-9) 2/3/2021 2/4/2021 5/4/2021   PHQ-2 Total Score 0 0 -   PHQ-2 Total Score - - 0   PHQ-9 Total Score - 0 -     Interpretation of PHQ-9  Total Score   Score Severity   1-4 No Depression   5-9 Mild Depression   10-14 Moderate Depression   15-19 Moderately Severe Depression   20-27 Severe Depression     OBJECTIVE:   Vital Signs:  Vitals:    11/11/21 1459   BP: 104/60   Pulse: 100   Resp: 18   Temp: 37 °C (98.6 °F)   SpO2: 98%        Pertinent Labs:  Lab Results   Component Value Date/Time    SODIUM 141 05/03/2021 09:35 AM    POTASSIUM 4.3 05/03/2021 09:35 AM    CHLORIDE 107 05/03/2021 09:35 AM    CO2 23 05/03/2021 09:35 AM    GLUCOSE 145 (H) 05/03/2021 09:35 AM    BUN 12 05/03/2021 09:35 AM    CREATININE 0.52 05/03/2021 09:35 AM       Lab Results   Component Value Date/Time    HBA1C 6.0 (A) 08/26/2021 04:56 PM       Lab Results   Component Value Date/Time    WBC 7.4 05/03/2021 09:35 AM    RBC 4.33 05/03/2021 09:35 AM    HEMOGLOBIN 13.2 05/03/2021 09:35 AM    HEMATOCRIT 40.5 05/03/2021 09:35 AM    MCV 93.5 05/03/2021 09:35 AM    MCH 30.5 05/03/2021 09:35 AM    MCHC 32.6 (L) 05/03/2021 09:35 AM    MPV 10.9 05/03/2021 09:35 AM    NEUTSPOLYS 49.30 05/03/2021 09:35 AM    LYMPHOCYTES 40.20 05/03/2021 09:35 AM    MONOCYTES 6.80 05/03/2021 09:35 AM    EOSINOPHILS 2.60 05/03/2021 09:35 AM    BASOPHILS 0.80 05/03/2021 09:35 AM       Lab Results   Component Value Date/Time    ASTSGOT 7 (L) 05/03/2021 09:35 AM    ALTSGPT 10 05/03/2021 09:35 AM        Physical Exam:   GEN: No apparent distress  HEENT: Head normocephalic, atraumatic.  Sclera nonicteric bilaterally, no ocular discharge appreciated bilaterally.  CV: Extremities warm and well-perfused, no peripheral edema appreciated bilaterally.  PULMONARY: Breathing nonlabored on room air, no respiratory accessory muscle use.  Not requiring supplemental oxygen.  SKIN: No appreciable skin breakdown on exposed areas of skin.  PSYCH: Mood and affect within normal limits.  NEURO: Awake alert. Conversational. Logical thought content.  Ambulatory without assistive device.    ASSESSMENT/PLAN: Marni Muñoz  is a 45 y.o.  female with rehabilitation history significant for meningioma s/p craniotomy for resection 1/29/2021, here for evaluation. The following plan was discussed with the patient who is in agreement.     Visit Diagnoses     ICD-10-CM   1. Meningioma (HCC)  D32.9   2. Impairment of short-term and long-term memory  R41.3   3. Expressive aphasia  R47.01   4. Depressed mood  R45.89        Rehab/Neuro:   1. Meningioma s/p craniotomy for resection 1/29/2021  2. Subclinical seizures: On Keppra. Off Vimpat.   3. Hyponatremia secondary to SIADH.  Off of salt tabs. Resolved.   -Therapy: Continue cognitive exercises which are self implemented.  -Driving status: Cleared to return to driving.  Has safely return to driving.  -Neuropsychology: Established with psychology, Dr. Medrano.  -Function: Stable.  -Consultants: Will be seeing neurosurgery and neurology upcoming.  -Occupation: Currently selling knitting, cupcakes.  -We discussed/counseled brain injury and the impairments that can result from it in great detail.    Mood: Recently has had some turmoil with friends and family.  Has made her more frustrated lately.  -Follows with neuropsychology.  -Counseled on neuropsychology for counseling as well as medication management, patient defers on meds at this time.    Follow up: 6 months for reevaluation or sooner if needed.    Total time spent was 46 minutes.  Included in this time is the time spent preparing for the visit including record review, my exam and evaluation, counseling and education regarding that which is aforementioned in the assessment and plan. Discussion involved the patient.    Please note that this dictation was created using voice recognition software. I have made every reasonable attempt to correct obvious errors but there may be errors of grammar and content that I may have overlooked prior to finalization of this note.    Dr. Jaclyn Shepard DO, MS  Department of Physical Medicine & Rehabilitation  Neuro  [facility]  [facility]

## 2021-12-07 DIAGNOSIS — E11.9 TYPE 2 DIABETES MELLITUS WITHOUT COMPLICATION, WITH LONG-TERM CURRENT USE OF INSULIN (HCC): ICD-10-CM

## 2021-12-07 DIAGNOSIS — Z79.4 TYPE 2 DIABETES MELLITUS WITHOUT COMPLICATION, WITH LONG-TERM CURRENT USE OF INSULIN (HCC): ICD-10-CM

## 2022-04-18 DIAGNOSIS — Z79.4 TYPE 2 DIABETES MELLITUS WITHOUT COMPLICATION, WITH LONG-TERM CURRENT USE OF INSULIN (HCC): ICD-10-CM

## 2022-04-18 DIAGNOSIS — E11.9 TYPE 2 DIABETES MELLITUS WITHOUT COMPLICATION, WITH LONG-TERM CURRENT USE OF INSULIN (HCC): ICD-10-CM

## 2022-05-15 DIAGNOSIS — E11.9 TYPE 2 DIABETES MELLITUS WITHOUT COMPLICATION, WITH LONG-TERM CURRENT USE OF INSULIN (HCC): ICD-10-CM

## 2022-05-15 DIAGNOSIS — Z79.4 TYPE 2 DIABETES MELLITUS WITHOUT COMPLICATION, WITH LONG-TERM CURRENT USE OF INSULIN (HCC): ICD-10-CM

## 2022-05-16 RX ORDER — PEN NEEDLE, DIABETIC 32GX 5/32"
NEEDLE, DISPOSABLE MISCELLANEOUS
Qty: 200 EACH | Refills: 1 | Status: SHIPPED | OUTPATIENT
Start: 2022-05-16

## 2022-05-16 NOTE — TELEPHONE ENCOUNTER
T-ZONE message sent to schedule appointment.     Received request via: Pharmacy    Was the patient seen in the last year in this department? Yes    Does the patient have an active prescription (recently filled or refills available) for medication(s) requested? No

## 2022-11-02 ENCOUNTER — TELEPHONE (OUTPATIENT)
Dept: HEALTH INFORMATION MANAGEMENT | Facility: OTHER | Age: 47
End: 2022-11-02
Payer: COMMERCIAL

## 2022-11-02 NOTE — TELEPHONE ENCOUNTER
Outcome: Scheduled appointment.    Please transfer to [facility] at [phone] when patient returns call.      Attempt # 2

## 2022-11-08 ENCOUNTER — HOSPITAL ENCOUNTER (OUTPATIENT)
Facility: MEDICAL CENTER | Age: 47
End: 2022-11-08
Attending: PHYSICIAN ASSISTANT
Payer: COMMERCIAL

## 2022-11-08 ENCOUNTER — OFFICE VISIT (OUTPATIENT)
Dept: MEDICAL GROUP | Facility: IMAGING CENTER | Age: 47
End: 2022-11-08
Payer: COMMERCIAL

## 2022-11-08 VITALS
HEART RATE: 96 BPM | WEIGHT: 258 LBS | BODY MASS INDEX: 41.46 KG/M2 | HEIGHT: 66 IN | OXYGEN SATURATION: 95 % | SYSTOLIC BLOOD PRESSURE: 114 MMHG | DIASTOLIC BLOOD PRESSURE: 80 MMHG | TEMPERATURE: 97.3 F

## 2022-11-08 DIAGNOSIS — Z23 NEED FOR VACCINATION: ICD-10-CM

## 2022-11-08 DIAGNOSIS — Z12.12 SCREENING FOR COLORECTAL CANCER: ICD-10-CM

## 2022-11-08 DIAGNOSIS — Z13.29 SCREENING FOR THYROID DISORDER: ICD-10-CM

## 2022-11-08 DIAGNOSIS — Z13.21 ENCOUNTER FOR VITAMIN DEFICIENCY SCREENING: ICD-10-CM

## 2022-11-08 DIAGNOSIS — E11.9 TYPE 2 DIABETES MELLITUS WITHOUT COMPLICATION, WITH LONG-TERM CURRENT USE OF INSULIN (HCC): ICD-10-CM

## 2022-11-08 DIAGNOSIS — R56.9 SEIZURE (HCC): ICD-10-CM

## 2022-11-08 DIAGNOSIS — Z79.4 TYPE 2 DIABETES MELLITUS WITHOUT COMPLICATION, WITH LONG-TERM CURRENT USE OF INSULIN (HCC): ICD-10-CM

## 2022-11-08 DIAGNOSIS — Z00.00 WELLNESS EXAMINATION: ICD-10-CM

## 2022-11-08 DIAGNOSIS — Z12.11 SCREENING FOR COLORECTAL CANCER: ICD-10-CM

## 2022-11-08 DIAGNOSIS — Z13.0 SCREENING FOR DEFICIENCY ANEMIA: ICD-10-CM

## 2022-11-08 DIAGNOSIS — Z11.59 NEED FOR HEPATITIS C SCREENING TEST: ICD-10-CM

## 2022-11-08 DIAGNOSIS — D32.9 MENINGIOMA (HCC): ICD-10-CM

## 2022-11-08 PROCEDURE — 90471 IMMUNIZATION ADMIN: CPT | Performed by: PHYSICIAN ASSISTANT

## 2022-11-08 PROCEDURE — 99396 PREV VISIT EST AGE 40-64: CPT | Mod: 25 | Performed by: PHYSICIAN ASSISTANT

## 2022-11-08 PROCEDURE — 82570 ASSAY OF URINE CREATININE: CPT

## 2022-11-08 PROCEDURE — 90472 IMMUNIZATION ADMIN EACH ADD: CPT | Performed by: PHYSICIAN ASSISTANT

## 2022-11-08 PROCEDURE — 90686 IIV4 VACC NO PRSV 0.5 ML IM: CPT | Performed by: PHYSICIAN ASSISTANT

## 2022-11-08 PROCEDURE — 82043 UR ALBUMIN QUANTITATIVE: CPT

## 2022-11-08 PROCEDURE — 90746 HEPB VACCINE 3 DOSE ADULT IM: CPT | Performed by: PHYSICIAN ASSISTANT

## 2022-11-08 RX ORDER — CANAGLIFLOZIN 300 MG/1
1 TABLET, FILM COATED ORAL DAILY
Qty: 90 TABLET | Refills: 3 | Status: SHIPPED | OUTPATIENT
Start: 2022-11-08

## 2022-11-08 RX ORDER — INSULIN GLARGINE 100 [IU]/ML
10 INJECTION, SOLUTION SUBCUTANEOUS EVERY EVENING
Qty: 3 ML | Refills: 5
Start: 2022-11-08

## 2022-11-08 RX ORDER — DIPHENHYDRAMINE HCL 12.5MG/5ML
12.5 LIQUID (ML) ORAL 4 TIMES DAILY PRN
COMMUNITY

## 2022-11-08 RX ORDER — DIPHENHYDRAMINE HYDROCHLORIDE 25 MG/1
CAPSULE, LIQUID FILLED ORAL
Qty: 1 KIT | Refills: 0 | Status: SHIPPED | OUTPATIENT
Start: 2022-11-08

## 2022-11-08 ASSESSMENT — PAIN SCALES - GENERAL: PAINLEVEL: 3=SLIGHT PAIN

## 2022-11-08 ASSESSMENT — FIBROSIS 4 INDEX: FIB4 SCORE: 0.29

## 2022-11-08 ASSESSMENT — PATIENT HEALTH QUESTIONNAIRE - PHQ9: CLINICAL INTERPRETATION OF PHQ2 SCORE: 0

## 2022-11-09 LAB
CREAT UR-MCNC: 206.83 MG/DL
MICROALBUMIN UR-MCNC: 1.5 MG/DL
MICROALBUMIN/CREAT UR: 7 MG/G (ref 0–30)

## 2022-11-09 NOTE — ASSESSMENT & PLAN NOTE
Chronic, on Lantus 10 units every evening and metformin 1000 mg twice a day.  States that her insurance is not covering Jardiance.  Lost her glucometer.  On statin.  Admits to weight gain.

## 2022-11-09 NOTE — PROGRESS NOTES
Subjective:     CC:   Chief Complaint   Patient presents with    Annual Exam       HPI:   Marni presents today to discuss:    Meningioma (HCC)  Patient status post left parietal meningioma resection in January 2021.  On Keppra for seizure prophylaxis.  No breakthrough seizures.    Type 2 diabetes mellitus without complication, with long-term current use of insulin (HCC)  Chronic, on Lantus 10 units every evening and metformin 1000 mg twice a day.  States that her insurance is not covering Jardiance.  Lost her glucometer.  On statin.  Admits to weight gain.    Health Maintenance/Anticipatory Guidance:  Diet: Not balanced, weight gain  Exercise: Not routinely  Substance Abuse: no history  Safe in Relationship: Not a current relationship  Sun Protection/Sunscreen: Yes  Dentist: twice yearly appointments  Eye Doctor: Appointment this month  Seatbelts, helmets, and gun safety discussed    Cancer Screening:  Colorectal Cancer: Due  Cervical Cancer: Up-to-date  Breast Cancer: Due next year    Infectious Disease Screening:  STI/HIV screening: Refused    Immunizations: Hep B and flu shot today.  Prevnar next visit.  COVID booster at the pharmacy.    Past Medical History:   Diagnosis Date    Amenorrhea 5/21/2021    Brain lesion 1/27/2021    Contusion 4/8/2011    Hyponatremia 1/27/2021    Migraine     Tobacco use 3/1/2021     Family History   Problem Relation Age of Onset    Arterial Aneurysm Father     Cancer Maternal Grandmother         breast cancer     Past Surgical History:   Procedure Laterality Date    CRANIOTOMY STEALTH Left 1/29/2021    Procedure: CRANIOTOMY, USING FRAMELESS STEREOTAXY-FOR TUMOR;  Surgeon: Luke Soto M.D.;  Location: SURGERY [facility];  Service: Neurosurgery    CHOLECYSTECTOMY      OTHER ORTHOPEDIC SURGERY      L meniscus and ACL repair     Social History     Tobacco Use    Smoking status: Former     Packs/day: 0.25     Years: 22.00     Pack years: 5.50     Types: Cigarettes     Quit  date: 10/8/2010     Years since quittin.0    Smokeless tobacco: Never    Tobacco comments:     quit last hospital visit   Vaping Use    Vaping Use: Never used   Substance Use Topics    Alcohol use: Not Currently    Drug use: No     Social History     Social History Narrative    Not on file     Current Outpatient Medications Ordered in Epic   Medication Sig Dispense Refill    diphenhydrAMINE (BENADRYL) 12.5 MG/5ML Elixir Take 5 mL by mouth 4 times a day as needed.      Canagliflozin (INVOKANA) 300 MG Tab Take 1 Tablet by mouth every day. 90 Tablet 3    insulin glargine (LANTUS SOLOSTAR) 100 UNIT/ML Solution Pen-injector injection Inject 10 Units under the skin every evening. 3 mL 5    Blood Glucose Monitoring Suppl (BLOOD GLUCOSE MONITOR SYSTEM) w/Device Kit Test blood sugar as recommended by provider 1 Kit 0    metformin (GLUCOPHAGE) 1000 MG tablet TAKE ONE (1) TABLET BY MOUTH TWICE DAILY WITH MEALS 60 Tablet 0    Insulin Pen Needle 32 G x 4 mm (BD PEN NEEDLE SARAI 2ND GEN) USE TO INJECT INSULIN FOUR TIMES DAILY 200 Each 1    Lancets Use one One Touch Verio Flex lancet to test blood sugar three times daily. 100 Each 1    glucose blood strip Use one strip to test blood sugar three times daily 100 Strip 1    atorvastatin (LIPITOR) 20 MG Tab TAKE 1 TABLET BY MOUTH AT BEDTIME 90 Tablet 3    levetiracetam (KEPPRA) 1000 MG tablet Take 1 Tab by mouth 2 Times a Day. 60 Tab 0    acetaminophen (TYLENOL) 500 MG Tab Take 2 Tablets by mouth 2 times a day as needed for Moderate Pain.      Ascorbic Acid (VITAMIN C) 1000 MG Tab Take 1 Tablet by mouth every day.      Multiple Vitamins-Minerals (OCUVITE PO) Take 1 Tab by mouth every day.       No current [facility]-ordered facility-administered medications on file.     Amoxicillin, Food, and Potassium    PMH/PSH/FH/Social history reviewed.  Vaccinations discussed.  Previous records and labs reviewed. Discussed age appropriate anticipatory guidance.    ROS: see hpi  Gen: no  "fevers/chills  Pulm: no sob, no cough  CV: no chest pain, no palpitations, no edema  GI: no nausea/vomiting, no diarrhea  Skin: no rash    Objective:   Exam:  /80 (BP Location: Right arm, Patient Position: Sitting, BP Cuff Size: Adult)   Pulse 96   Temp 36.3 °C (97.3 °F) (Temporal)   Ht 1.676 m (5' 6\")   Wt 117 kg (258 lb)   LMP 07/22/2021 (Exact Date)   SpO2 95%   BMI 41.64 kg/m²    Body mass index is 41.64 kg/m².    Gen: Alert and oriented, No apparent distress.  HEENT: Head atraumatic, normocephalic. Pupils equal and round.  Neck: Neck is supple without lymphadenopathy.   Lungs: Normal effort, CTA bilaterally, no wheezes, rhonchi, or rales  CV: Regular rate and rhythm. No murmurs, rubs, or gallops.  ABD: +BS. Non-tender, non-distended. No rebound, rigidity, or guarding.  Ext: No clubbing, cyanosis, edema.    Monofilament testing with a 10 gram force: sensation intact: intact bilaterally  Visual Inspection: Feet without maceration, ulcers, fissures.  Pedal pulses: intact bilaterally      Assessment & Plan:     47 y.o. female with the following -     1. Wellness examination  PMH/PSH/FH/Social history reviewed.  Vaccinations discussed.  Previous records and labs reviewed. Discussed age appropriate anticipatory guidance.    2. Type 2 diabetes mellitus without complication, with long-term current use of insulin (HCC)  Chronic, controlled and stable. Continue current regimen but will change Jardiance to Invokana due to insurance.  Check updated labs.  - Diabetic Monofilament LE Exam  - Comp Metabolic Panel; Future  - MICROALBUMIN CREAT RATIO URINE; Future  - HEMOGLOBIN A1C; Future  - Lipid Profile; Future  - Canagliflozin (INVOKANA) 300 MG Tab; Take 1 Tablet by mouth every day.  Dispense: 90 Tablet; Refill: 3  - insulin glargine (LANTUS SOLOSTAR) 100 UNIT/ML Solution Pen-injector injection; Inject 10 Units under the skin every evening.  Dispense: 3 mL; Refill: 5  - Blood Glucose Monitoring Suppl (BLOOD " GLUCOSE MONITOR SYSTEM) w/Device Kit; Test blood sugar as recommended by provider  Dispense: 1 Kit; Refill: 0    3. Meningioma (HCC)  Status post resection, follow-up with neurosurgery and neurology as scheduled.    4. Seizure (HCC)  On Keppra, follow-up with neurology    5. Screening for colorectal cancer  - Referral to GI for Colonoscopy    6. Need for hepatitis C screening test  - HCV Scrn ( [id number] 1xLife); Future    7. Screening for deficiency anemia  - CBC WITH DIFFERENTIAL; Future    8. Screening for thyroid disorder  - TSH WITH REFLEX TO FT4; Future    9. Encounter for vitamin deficiency screening  - VITAMIN D,25 HYDROXY (DEFICIENCY); Future  - VITAMIN B12; Future    10. Need for vaccination  - Hepatitis B Vaccine Adult 20+  - INFLUENZA VACCINE QUAD INJ (PF)      Return for Will notify patient to follow-up pending tests.    Barbara Knapp PA-C ([place])  Physician Assistant Certified  [facility]    Please note that this dictation was created using voice recognition software. I have made every reasonable attempt to correct obvious errors, but I expect that there are errors of grammar and possibly content that I did not discover before finalizing the note.

## 2022-11-09 NOTE — PATIENT INSTRUCTIONS
It was a pleasure meeting with you today at [facility]!    Your medical history/records and medications were reviewed today.     Please review my practice information below:    If you have any prescription refill requests, please send them via [employer] or discuss with your provider at the start of your office visits. Please allow 3-5 business days for lab and testing review and you will be contacted via Tink with those results, or if advised to make a follow up appointment regarding those results, then please do so.     Once resulted, your lab/test/imaging results will show up automatically in your MyChart. Please wait for my interpretation and recommendations prior to viewing your results to avoid any unnecessary confusion or misinterpretation. I will address all of the lab values that I interpret as abnormal and message you accordingly on your MyChart. I will always send you a message about your results even if they are normal. If you do not hear back from me within 5-7 business days after completing your tests, then please send me a message on [employer]let so I can obtain your results (especially if you went to an outside lab or imaging center - [facility], [facility], etc).     If you have any additional questions or concerns beyond my interpretation of your results, please make an appointment with me to discuss in further detail.    Please only use the Tink messaging system for questions regarding your most recent appointment or if advised to use otherwise (glucose or blood pressure reporting).     If you have any new problems or concerns, you must make an appointment to discuss. This includes any referral requests, lab requests (unless advised to notify me for pre-appt labs), medication side effects, or request for medication adjustments.     Please arrive 15 minutes prior to your appointment time to complete your check-in and intake with the medical assistant.      Thank you,    Barbara Fontaine) Ra  LYNN  Physician Assistant Certified  [facility]    -----------------------------------------------------------------    Attn: Patients of [facility]:    In an effort to continue to provide excellent and efficient care to our patients, it is vital that we continue to use our resources appropriately. With that, this is a reminder that [employer] is used for prescription refill requests, test results, virtual visits, and chart review only.     Any new questions, concerns/conditions, lab/imaging requests, medication adjustments, new prescriptions, or referral requests do require an appointment (virtually or in person), unless discussed otherwise at your most recent appointment.     Thank you for your understanding,    [facility]

## 2022-11-09 NOTE — ASSESSMENT & PLAN NOTE
Patient status post left parietal meningioma resection in January 2021.  On Keppra for seizure prophylaxis.  No breakthrough seizures.

## 2022-11-11 DIAGNOSIS — Z79.4 TYPE 2 DIABETES MELLITUS WITHOUT COMPLICATION, WITH LONG-TERM CURRENT USE OF INSULIN (HCC): ICD-10-CM

## 2022-11-11 DIAGNOSIS — E11.9 TYPE 2 DIABETES MELLITUS WITHOUT COMPLICATION, WITH LONG-TERM CURRENT USE OF INSULIN (HCC): ICD-10-CM

## 2022-11-11 NOTE — TELEPHONE ENCOUNTER
Received request via: Pharmacy    Was the patient seen in the last year in this department? Yes    Does the patient have an active prescription (recently filled or refills available) for medication(s) requested? No    Does the patient have long term Plus and need 100 day supply (blood pressure, diabetes and cholesterol meds only)? Patient does not have SCP

## 2022-11-15 ENCOUNTER — OFFICE VISIT (OUTPATIENT)
Dept: PHYSICAL MEDICINE AND REHAB | Facility: MEDICAL CENTER | Age: 47
End: 2022-11-15
Payer: COMMERCIAL

## 2022-11-15 VITALS
OXYGEN SATURATION: 96 % | BODY MASS INDEX: 41.78 KG/M2 | DIASTOLIC BLOOD PRESSURE: 82 MMHG | HEIGHT: 66 IN | HEART RATE: 107 BPM | TEMPERATURE: 96.5 F | SYSTOLIC BLOOD PRESSURE: 102 MMHG | WEIGHT: 260 LBS

## 2022-11-15 DIAGNOSIS — Z87.898 HISTORY OF SEIZURES: ICD-10-CM

## 2022-11-15 DIAGNOSIS — R47.01 EXPRESSIVE APHASIA: ICD-10-CM

## 2022-11-15 DIAGNOSIS — R25.2 SPASTICITY: ICD-10-CM

## 2022-11-15 DIAGNOSIS — D32.9 MENINGIOMA (HCC): Primary | ICD-10-CM

## 2022-11-15 PROCEDURE — 99214 OFFICE O/P EST MOD 30 MIN: CPT | Performed by: PHYSICAL MEDICINE & REHABILITATION

## 2022-11-15 ASSESSMENT — FIBROSIS 4 INDEX: FIB4 SCORE: 0.29

## 2022-11-15 ASSESSMENT — PAIN SCALES - GENERAL: PAINLEVEL: NO PAIN

## 2022-11-16 NOTE — PROGRESS NOTES
[facility]  [facility]  [address] [place] [address]  Ph: [phone]    FOLLOW UP PATIENT EVALUATION    Patient Name: Marni Muñoz   Patient : 1975  Patient Age: 47 y.o.     SUBJECTIVE:   Patient Identification: Marni Muñoz is a 47 y.o. female with PMH significant for migraines and rehabilitation history significant for meningioma s/p craniotomy for resection 2021 and is presenting to PM&R clinic for a FOLLOW UP OUTPATIENT evaluation with the following chief complaint/s:    Chief Complaint: Brain Injury Follow Up    History of Present Illness:    - Patient has done significantly well since we last met 1 year ago.  - She has returned to work.  She is at an injury level position compared to where she was, but is working her way up.  - They recently went through my office and she was able to continue working.  - She continues to commit a lot of her spare time to her rehabilitation.  - She continues to knit, make cakes, etc.  - She was unable to complete learning Senegalese due to the fact that she had previously learned Mongolian and kept defaulting to that.  - She is going to have another MRI soon.  There is a small area of meningioma left but per neurosurgery this is likely not to grow at a significant rate or at all.  She is awaiting insurance to authorize her MRI.  - She continues to have some stiffness of the left lower extremity but otherwise is improving.  - She can work from home, does have to go into work 1 day a week, she tries to go in 2 days a week in order to help her endurance.  - Has been driving for quite some time without any issue at all.    Review of Systems:  All other pertinent positive review of systems are noted above in HPI.   All other systems reviewed and are negative.    Past Medical History:  Past Medical History:   Diagnosis Date    Amenorrhea 2021    Tobacco use 3/1/2021    Hyponatremia 2021    Brain lesion  1/27/2021    Contusion 4/8/2011    Migraine       Past Surgical History:   Procedure Laterality Date    CRANIOTOMY STEALTH Left 1/29/2021    Procedure: CRANIOTOMY, USING FRAMELESS STEREOTAXY-FOR TUMOR;  Surgeon: Luke Soto M.D.;  Location: SURGERY [facility];  Service: Neurosurgery    CHOLECYSTECTOMY      OTHER ORTHOPEDIC SURGERY      L meniscus and ACL repair        Current Outpatient Medications:     metformin (GLUCOPHAGE) 1000 MG tablet, Take 1 Tablet by mouth 2 times a day with meals., Disp: 180 Tablet, Rfl: 1    diphenhydrAMINE (BENADRYL) 12.5 MG/5ML Elixir, Take 5 mL by mouth 4 times a day as needed., Disp: , Rfl:     Canagliflozin (INVOKANA) 300 MG Tab, Take 1 Tablet by mouth every day., Disp: 90 Tablet, Rfl: 3    insulin glargine (LANTUS SOLOSTAR) 100 UNIT/ML Solution Pen-injector injection, Inject 10 Units under the skin every evening., Disp: 3 mL, Rfl: 5    Blood Glucose Monitoring Suppl (BLOOD GLUCOSE MONITOR SYSTEM) w/Device Kit, Test blood sugar as recommended by provider, Disp: 1 Kit, Rfl: 0    Insulin Pen Needle 32 G x 4 mm (BD PEN NEEDLE SARAI 2ND GEN), USE TO INJECT INSULIN FOUR TIMES DAILY, Disp: 200 Each, Rfl: 1    Lancets, Use one One Touch Verio Flex lancet to test blood sugar three times daily., Disp: 100 Each, Rfl: 1    glucose blood strip, Use one strip to test blood sugar three times daily, Disp: 100 Strip, Rfl: 1    atorvastatin (LIPITOR) 20 MG Tab, TAKE 1 TABLET BY MOUTH AT BEDTIME, Disp: 90 Tablet, Rfl: 3    levetiracetam (KEPPRA) 1000 MG tablet, Take 1 Tab by mouth 2 Times a Day., Disp: 60 Tab, Rfl: 0    acetaminophen (TYLENOL) 500 MG Tab, Take 2 Tablets by mouth 2 times a day as needed for Moderate Pain., Disp: , Rfl:     Ascorbic Acid (VITAMIN C) 1000 MG Tab, Take 1 Tablet by mouth every day., Disp: , Rfl:     Multiple Vitamins-Minerals (OCUVITE PO), Take 1 Tab by mouth every day., Disp: , Rfl:   Allergies   Allergen Reactions    Amoxicillin Rash    Food Vomiting     Bananas  and marijuana    Potassium      Itchy and swollen lips        Past Social History:  Social History     Socioeconomic History    Marital status: Single     Spouse name: Not on file    Number of children: Not on file    Years of education: Not on file    Highest education level: Not on file   Occupational History    Not on file   Tobacco Use    Smoking status: Former     Packs/day: 0.25     Years: 22.00     Pack years: 5.50     Types: Cigarettes     Quit date: 10/8/2010     Years since quittin.1    Smokeless tobacco: Never    Tobacco comments:     quit last hospital visit   Vaping Use    Vaping Use: Never used   Substance and Sexual Activity    Alcohol use: Not Currently    Drug use: No    Sexual activity: Not Currently   Other Topics Concern    Not on file   Social History Narrative    Not on file     Social Determinants of Health     Financial Resource Strain: Not on file   Food Insecurity: Not on file   Transportation Needs: Not on file   Physical Activity: Not on file   Stress: Not on file   Social Connections: Not on file   Intimate Partner Violence: Not on file   Housing Stability: Not on file        Family History:  Family History   Problem Relation Age of Onset    Arterial Aneurysm Father     Cancer Maternal Grandmother         breast cancer       Depression and Opioid Screening  PHQ-9:      2021     8:00 AM 2021     1:00 PM 2022     4:20 PM   Depression Screen (PHQ-2/PHQ-9)   PHQ-2 Total Score 0     PHQ-2 Total Score  0 0   PHQ-9 Total Score 0       Interpretation of PHQ-9 Total Score   Score Severity   1-4 No Depression   5-9 Mild Depression   10-14 Moderate Depression   15-19 Moderately Severe Depression   20-27 Severe Depression     OBJECTIVE:   Vital Signs:  Vitals:    11/15/22 1715   BP: 102/82   Pulse: (!) 107   Temp: 35.8 °C (96.5 °F)   SpO2: 96%        Pertinent Labs:  Lab Results   Component Value Date/Time    SODIUM 141 2021 09:35 AM    POTASSIUM 4.3 2021 09:35 AM     CHLORIDE 107 05/03/2021 09:35 AM    CO2 23 05/03/2021 09:35 AM    GLUCOSE 145 (H) 05/03/2021 09:35 AM    BUN 12 05/03/2021 09:35 AM    CREATININE 0.52 05/03/2021 09:35 AM       Lab Results   Component Value Date/Time    HBA1C 6.0 (A) 08/26/2021 04:56 PM       Lab Results   Component Value Date/Time    WBC 7.4 05/03/2021 09:35 AM    RBC 4.33 05/03/2021 09:35 AM    HEMOGLOBIN 13.2 05/03/2021 09:35 AM    HEMATOCRIT 40.5 05/03/2021 09:35 AM    MCV 93.5 05/03/2021 09:35 AM    MCH 30.5 05/03/2021 09:35 AM    MCHC 32.6 (L) 05/03/2021 09:35 AM    MPV 10.9 05/03/2021 09:35 AM    NEUTSPOLYS 49.30 05/03/2021 09:35 AM    LYMPHOCYTES 40.20 05/03/2021 09:35 AM    MONOCYTES 6.80 05/03/2021 09:35 AM    EOSINOPHILS 2.60 05/03/2021 09:35 AM    BASOPHILS 0.80 05/03/2021 09:35 AM       Lab Results   Component Value Date/Time    ASTSGOT 7 (L) 05/03/2021 09:35 AM    ALTSGPT 10 05/03/2021 09:35 AM        Physical Exam:   GEN: No apparent distress  HEENT: Head normocephalic, atraumatic.  Sclera nonicteric bilaterally, no ocular discharge appreciated bilaterally.  CV: Extremities warm and well-perfused, no peripheral edema appreciated bilaterally.  PULMONARY: Breathing nonlabored on room air, no respiratory accessory muscle use.  Not requiring supplemental oxygen.  SKIN: No appreciable skin breakdown on exposed areas of skin.  PSYCH: Mood and affect within normal limits.  NEURO: Awake alert.  Conversational.  Logical thought content.  Answers questions appropriately.  Ambulatory without assistive device does have slightly stiffened gait on the left.    ASSESSMENT/PLAN: Marni Muñoz  is a 47 y.o. female with rehabilitation history significant for meningioma s/p craniotomy for resection 1/29/2021, here for evaluation. The following plan was discussed with the patient who is in agreement.     Visit Diagnoses     ICD-10-CM   1. Meningioma (HCC)  D32.9   2. History of seizures  Z87.898   3. Expressive aphasia  R47.01   4. Spasticity  R25.2         Rehab/Neuro:   Meningioma s/p craniotomy for resection 1/29/2021  Subclinical seizures: On Keppra. Off Vimpat.   Hyponatremia secondary to SIADH.  Off of salt tabs. Resolved.   -Therapy: Continue self implemented therapy program.  -Driving status: Has safely return to driving.  -Neuropsychology: No longer established with psychology due to the fact that her previous practitioner does not accept her insurance.  -Function: Improved.  -Consultants: Will be seeing neurosurgery for repeat MRI.  Will be seeing neurology, hope to be cleared to travel and to get repeat EEG and be off of Keppra.  -Occupation: Has returned to work full-time.     Follow up: As needed    Total time spent was 30 minutes.  Included in this time is the time spent preparing for the visit including record review, my exam and evaluation, counseling and education regarding that which is aforementioned in the assessment and plan. Some of the time included occurred outside of charting time.  Discussion involved the patient.      Please note that this dictation was created using voice recognition software. I have made every reasonable attempt to correct obvious errors but there may be errors of grammar and content that I may have overlooked prior to finalization of this note.    Dr. Jaclyn Shepard DO, MS  Department of Physical Medicine & Rehabilitation  Neuro Rehabilitation Clinic  [facility]

## 2022-12-02 ENCOUNTER — DOCUMENTATION (OUTPATIENT)
Dept: MEDICAL GROUP | Facility: IMAGING CENTER | Age: 47
End: 2022-12-02
Payer: COMMERCIAL

## 2022-12-02 NOTE — PROGRESS NOTES
MEDICATION PRIOR AUTHORIZATION STARTED:    1. Name of Medication: Invokana    2. Requested By (Name of Pharmacy): [facility]     3. Is insurance on file current? Yes    4. What is the name & phone number of the 3rd party payor? Marni Muñoz  [phone] (work)

## 2023-01-18 PROCEDURE — 99284 EMERGENCY DEPT VISIT MOD MDM: CPT

## 2023-01-18 ASSESSMENT — FIBROSIS 4 INDEX: FIB4 SCORE: 0.29

## 2023-01-19 ENCOUNTER — APPOINTMENT (OUTPATIENT)
Dept: RADIOLOGY | Facility: MEDICAL CENTER | Age: 48
End: 2023-01-19
Attending: EMERGENCY MEDICINE
Payer: COMMERCIAL

## 2023-01-19 ENCOUNTER — HOSPITAL ENCOUNTER (EMERGENCY)
Facility: MEDICAL CENTER | Age: 48
End: 2023-01-19
Attending: EMERGENCY MEDICINE
Payer: COMMERCIAL

## 2023-01-19 VITALS
DIASTOLIC BLOOD PRESSURE: 91 MMHG | HEART RATE: 95 BPM | BODY MASS INDEX: 42.48 KG/M2 | SYSTOLIC BLOOD PRESSURE: 133 MMHG | WEIGHT: 264.33 LBS | RESPIRATION RATE: 16 BRPM | TEMPERATURE: 98.1 F | HEIGHT: 66 IN | OXYGEN SATURATION: 96 %

## 2023-01-19 DIAGNOSIS — S63.502A SPRAIN OF LEFT WRIST, INITIAL ENCOUNTER: Primary | ICD-10-CM

## 2023-01-19 PROCEDURE — A9270 NON-COVERED ITEM OR SERVICE: HCPCS | Performed by: EMERGENCY MEDICINE

## 2023-01-19 PROCEDURE — 73110 X-RAY EXAM OF WRIST: CPT | Mod: LT

## 2023-01-19 PROCEDURE — 700102 HCHG RX REV CODE 250 W/ 637 OVERRIDE(OP): Performed by: EMERGENCY MEDICINE

## 2023-01-19 RX ORDER — ACETAMINOPHEN 500 MG
1000 TABLET ORAL ONCE
Status: COMPLETED | OUTPATIENT
Start: 2023-01-19 | End: 2023-01-19

## 2023-01-19 RX ORDER — GABAPENTIN 300 MG/1
300 CAPSULE ORAL 3 TIMES DAILY
Qty: 15 CAPSULE | Refills: 0 | Status: SHIPPED | OUTPATIENT
Start: 2023-01-19

## 2023-01-19 RX ORDER — GABAPENTIN 300 MG/1
300 CAPSULE ORAL ONCE
Status: COMPLETED | OUTPATIENT
Start: 2023-01-19 | End: 2023-01-19

## 2023-01-19 RX ADMIN — GABAPENTIN 300 MG: 300 CAPSULE ORAL at 00:48

## 2023-01-19 RX ADMIN — ACETAMINOPHEN 1000 MG: 500 TABLET ORAL at 00:48

## 2023-01-19 NOTE — ED NOTES
"Pt discharged home. Pt in possession of belongings. Pt provided discharge education and information pertaining to medications and follow up appointments. Pt received copy of discharge instructions and verbalized understanding. BP (!) 133/91   Pulse 95   Temp 36.7 °C (98.1 °F) (Temporal)   Resp 16   Ht 1.676 m (5' 6\")   Wt 120 kg (264 lb 5.3 oz)   LMP 07/22/2021 (Exact Date)   SpO2 96%   BMI 42.66 kg/m²     "

## 2023-01-19 NOTE — ED PROVIDER NOTES
ER Provider Note    Scribed for Norberto Fernandez Ii, M.d. by Lee Simons. 1/19/2023  12:28 AM    Primary Care Provider: Barbara Knapp P.A.-C.    CHIEF COMPLAINT  Chief Complaint   Patient presents with    Wrist Pain     LIMITATION TO HISTORY   Select: : None    HPI/ROS  OUTSIDE HISTORIAN(S):  None    EXTERNAL RECORDS REVIEWED  None    Marni Muñoz is a 47 y.o. female with a history of brain mass and seizures, who presents to the ED for evaluation of left wrist pain onset 2 days ago. Marni states that 2 days ago while cleaning snow of her vehicle she felt as if she sprained her left wrist. Yesterday, she continued about her day typing and while grasping a rail walking up some stairs her left wrist pain markedly increased. Marni was ultimately prompted to visit the ED over complaints of increased wrist pain. Associated symptoms include reduced range of motion of the left wrist. She denies any left wrist swelling. Her left wrist pain is exacerbated with movement or pressure. Marni has medicated with Tylenol (most recently yesterday at 3:30 PM) with minimal alleviation.    PAST MEDICAL HISTORY  Past Medical History:   Diagnosis Date    Amenorrhea 5/21/2021    Brain lesion 1/27/2021    Contusion 4/8/2011    Hyponatremia 1/27/2021    Migraine     Tobacco use 3/1/2021       SURGICAL HISTORY  Past Surgical History:   Procedure Laterality Date    CRANIOTOMY STEALTH Left 1/29/2021    Procedure: CRANIOTOMY, USING FRAMELESS STEREOTAXY-FOR TUMOR;  Surgeon: Luke Soto M.D.;  Location: SURGERY [facility];  Service: Neurosurgery    CHOLECYSTECTOMY      OTHER ORTHOPEDIC SURGERY      L meniscus and ACL repair       FAMILY HISTORY  Family History   Problem Relation Age of Onset    Arterial Aneurysm Father     Cancer Maternal Grandmother         breast cancer       SOCIAL HISTORY   reports that she quit smoking about 12 years ago. Her smoking use included cigarettes. She has a 5.50 pack-year smoking history.  She has never used smokeless tobacco. She reports that she does not currently use alcohol. She reports that she does not use drugs.    CURRENT MEDICATIONS  Discharge Medication List as of 1/19/2023  1:22 AM        CONTINUE these medications which have NOT CHANGED    Details   metformin (GLUCOPHAGE) 1000 MG tablet Take 1 Tablet by mouth 2 times a day with meals.patient requesting refillDisp-180 Tablet, R-1, Normal      diphenhydrAMINE (BENADRYL) 12.5 MG/5ML Elixir Take 5 mL by mouth 4 times a day as needed., Historical Med      Canagliflozin (INVOKANA) 300 MG Tab Take 1 Tablet by mouth every day., Disp-90 Tablet, R-3, Normal      insulin glargine (LANTUS SOLOSTAR) 100 UNIT/ML Solution Pen-injector injection Inject 10 Units under the skin every evening.ZERO refills remain on this prescription. Your patient is requesting advance approval of refills for this medication to PREVENT ANY MISSED DOSESDisp-3 mL, R-5, No Print      Blood Glucose Monitoring Suppl (BLOOD GLUCOSE MONITOR SYSTEM) w/Device Kit Test blood sugar as recommended by providerOr per formulary preference. ICD-10 code: E11.65 Uncontrolled type 2 Diabetes Mellitus. ONE TOUCH.Disp-1 Kit, R-0, Normal      Insulin Pen Needle 32 G x 4 mm (BD PEN NEEDLE SARAI 2ND GEN) USE TO INJECT INSULIN FOUR TIMES DAILY, Disp-200 Each, R-1, Normal      Lancets Use one One Touch Verio Flex lancet to test blood sugar three times daily.Or per formulary preference. ICD-10 code: E11.65 Uncontrolled type 2 Diabetes MellitusDisp-100 Each, R-1, Print Plain Paper      glucose blood strip Use one strip to test blood sugar three times dailyOr per formulary preference. ICD-10 code: E11.65 Uncontrolled type 2 Diabetes MellitusDisp-100 Strip, R-1, Print Plain Paper      atorvastatin (LIPITOR) 20 MG Tab TAKE 1 TABLET BY MOUTH AT BEDTIME, Disp-90 Tablet, R-3, Normal      levetiracetam (KEPPRA) 1000 MG tablet Take 1 Tab by mouth 2 Times a Day., Disp-60 Tab, R-0, Normal      acetaminophen  "(TYLENOL) 500 MG Tab Take 2 Tablets by mouth 2 times a day as needed for Moderate Pain., Historical Med      Ascorbic Acid (VITAMIN C) 1000 MG Tab Take 1 Tablet by mouth every day., Historical Med      Multiple Vitamins-Minerals (OCUVITE PO) Take 1 Tab by mouth every day., Historical Med             ALLERGIES  Amoxicillin, Food, Ibuprofen, Marijuana (cannabis sativa), and Potassium    PHYSICAL EXAM  BP (!) 144/92   Pulse (!) 108   Temp 35.8 °C (96.5 °F) (Temporal)   Resp 20   Ht 1.676 m (5' 6\")   Wt 120 kg (264 lb 5.3 oz)   LMP 07/22/2021 (Exact Date)   SpO2 93%   BMI 42.66 kg/m²     Constitutional: Alert in no apparent distress.  MSK: Tenderness to the ulnar aspect of the left wrist that is worse with pronation and supination. Left hand is well perfused and warm without neurological deficit.     DIAGNOSTIC STUDIES & PROCEDURES    Radiology:   The attending Emergency Physician has independently interpreted the diagnostic imaging associated with this visit and is awaiting the final reading from the radiologist, which will be displayed below.    DX-WRIST-COMPLETE 3+ LEFT   Final Result         1.  No acute traumatic bony injury.           COURSE & MEDICAL DECISION MAKING    ED Observation Status? No; Patient does not meet criteria for ED Observation.     INITIAL ASSESSMENT AND PLAN    12:28 AM - Patient seen and evaluated at bedside. Marni Muñoz is a 47 y.o. female with a history of brain mass and seizures, who presents with left wrist pain onset 2 days ago. Patient will be treated with Tylenol 1000 mg and Gabapentin 300 mg for her symptoms. Ordered DX-Wrist-Complete 3+ Left to evaluate. She understands and agrees to the plan of care.    12:58 AM - Imaging was independently interpreted by me to show normal alignment. There is no evidence of fracture. I have include a prescription for Gabapentin to treat her pain. Pain likely due to sprain.  Discussed plan for discharge; I advised the patient to " follow-up with Dr. Staton (Orthopedics) as needed if her wrist pain does not improve in 1 week, and to return to the [facility] with any new or worsening symptoms. Patient was given the opportunity for questions. I addressed all questions or concerns at this time and they verbalize agreement to the plan of care.         The patient will return for new or worsening symptoms and is stable at the time of discharge.    The patient is referred to a primary physician for blood pressure management, diabetic screening, and for all other preventative health concerns.    DISPOSITION:  Patient will be discharged home in stable condition.    FOLLOW UP:  Armando Staton M.D.  [address] [facility] [address]  [phone]    Schedule an appointment as soon as possible for a visit   As needed, if pain does not improve in next week    OUTPATIENT MEDICATIONS:  Discharge Medication List as of 1/19/2023  1:22 AM        START taking these medications    Details   gabapentin (NEURONTIN) 300 MG Cap Take 1 Capsule by mouth 3 times a day. Do not take while driving or operating potentially dangerous equipment., Disp-15 Capsule, R-0, Normal           FINAL IMPRESSION   1. Sprain of left wrist, initial encounter         Lee ZARATE (Scribe), am scribing for, and in the presence of, GAYE Jeffers II.    Electronically signed by: Lee Simons (Scribe), 1/19/2023    Norberto ZARATE II, M* personally performed the services described in this documentation, as scribed by Lee Simons in my presence, and it is both accurate and complete.    The note accurately reflects work and decisions made by me.  Norberto Fernandez II, M.D.  1/19/2023  5:30 AM

## 2023-01-19 NOTE — ED TRIAGE NOTES
Marni Muñoz  47 y.o.  Chief Complaint   Patient presents with    Wrist Pain     Ambulatory to lobby for above, pt reporting pain to L wrist after removing snow off her car. No deformity noted. CMS intact. A&Ox4, NAD, placed back in lobby.

## 2023-06-21 ENCOUNTER — OFFICE VISIT (OUTPATIENT)
Dept: URGENT CARE | Facility: CLINIC | Age: 48
End: 2023-06-21
Payer: COMMERCIAL

## 2023-06-21 ENCOUNTER — APPOINTMENT (OUTPATIENT)
Dept: RADIOLOGY | Facility: IMAGING CENTER | Age: 48
End: 2023-06-21
Attending: PHYSICIAN ASSISTANT
Payer: COMMERCIAL

## 2023-06-21 VITALS
OXYGEN SATURATION: 95 % | WEIGHT: 247 LBS | TEMPERATURE: 98.6 F | BODY MASS INDEX: 39.7 KG/M2 | RESPIRATION RATE: 14 BRPM | HEART RATE: 128 BPM | HEIGHT: 66 IN

## 2023-06-21 DIAGNOSIS — I10 HYPERTENSION, UNSPECIFIED TYPE: ICD-10-CM

## 2023-06-21 DIAGNOSIS — S63.501A SPRAIN AND STRAIN OF RIGHT WRIST: ICD-10-CM

## 2023-06-21 DIAGNOSIS — S66.911A SPRAIN AND STRAIN OF RIGHT WRIST: ICD-10-CM

## 2023-06-21 PROCEDURE — 73110 X-RAY EXAM OF WRIST: CPT | Mod: TC,RT | Performed by: PHYSICIAN ASSISTANT

## 2023-06-21 PROCEDURE — 99213 OFFICE O/P EST LOW 20 MIN: CPT | Performed by: PHYSICIAN ASSISTANT

## 2023-06-21 ASSESSMENT — ENCOUNTER SYMPTOMS
CHILLS: 0
TINGLING: 0
FOCAL WEAKNESS: 0
SENSORY CHANGE: 0
FEVER: 0

## 2023-06-21 NOTE — PROGRESS NOTES
"Subjective:   Marni Muñoz  is a 48 y.o. female who presents for right wrist injury    HPI    Patient notes she is over 2 days status post injury to right wrist.  She describes a mechanical trip and fall in the home causing a FOOSH type mechanism of injury on right wrist.  Patient is right-hand dominant.  She complains of pain swelling and slight bruising to right wrist.  She denies numbness tingling weakness.  Denies history of surgeries or significant injury to right wrist.  Complains of pain particularly with range of motion.      Review of Systems   Constitutional:  Negative for chills and fever.   Musculoskeletal:  Positive for joint pain (Right wrist).   Neurological:  Negative for tingling, sensory change and focal weakness.       Allergies   Allergen Reactions    Amoxicillin Rash    Food Vomiting     Bananas    Ibuprofen Unspecified     Cannot have since having brain sx    Marijuana (Cannabis Sativa) Vomiting    Potassium      Itchy and swollen lips        Objective:   Pulse (!) 128   Temp 37 °C (98.6 °F) (Temporal)   Resp 14   Ht 1.676 m (5' 6\")   Wt 112 kg (247 lb)   SpO2 95%   BMI 39.87 kg/m² /90    Physical Exam  Vitals and nursing note reviewed.   Constitutional:       General: She is not in acute distress.     Appearance: She is well-developed. She is not diaphoretic.   HENT:      Head: Normocephalic and atraumatic.      Right Ear: External ear normal.      Left Ear: External ear normal.      Nose: Nose normal.   Eyes:      General: Lids are normal. No scleral icterus.        Right eye: No discharge.         Left eye: No discharge.      Conjunctiva/sclera: Conjunctivae normal.   Pulmonary:      Effort: Pulmonary effort is normal. No respiratory distress.   Musculoskeletal:      Right wrist: Swelling and tenderness present. No deformity, effusion, lacerations, snuff box tenderness or crepitus. Decreased range of motion. Normal pulse.      Left wrist: Normal pulse.      Cervical back: " Neck supple.   Skin:     General: Skin is warm and dry.      Coloration: Skin is not pale.      Findings: No erythema.   Neurological:      Mental Status: She is alert and oriented to person, place, and time. She is not disoriented.   Psychiatric:         Speech: Speech normal.         Behavior: Behavior normal.     DX wrist-  FINDINGS:  There is no fracture or dislocation.  The visualized osseous structures are in anatomic alignment.  The joint spaces are preserved.  Bone mineralization is age-appropriate..        IMPRESSION:     No acute osseous abnormality.           Exam Ended: 06/21/23  1:51 PM Last Resulted: 06/21/23  1:58 PM           Patient is fit with a removable wrist brace and tolerates this well.    Assessment/Plan:   1. Sprain and strain of right wrist  - DX-WRIST-COMPLETE 3+ RIGHT; Future    2. Hypertension, unspecified type  Recommend conservative care, rest, ice, elevation, work on gentle ROM exercises, OTC tylenol, contact clinic with lack of improvement over the next 2 to 3 weeks for referral to orthopedics.    Patient is found to be hypertensive in clinic.  She should follow-up with primary care for hypertension.    I have worn an N95 mask, gloves and eye protection for the entire encounter with this patient.     Differential diagnosis, natural history, supportive care, and indications for immediate follow-up discussed.

## 2024-10-21 DIAGNOSIS — G40.909 SEIZURE DISORDER (HCC): ICD-10-CM

## 2024-10-23 ENCOUNTER — TELEPHONE (OUTPATIENT)
Dept: PHARMACY | Facility: MEDICAL CENTER | Age: 49
End: 2024-10-23
Payer: COMMERCIAL

## 2024-10-23 DIAGNOSIS — G40.909 SEIZURE DISORDER (HCC): ICD-10-CM

## 2024-10-23 RX ORDER — LEVETIRACETAM 500 MG/1
1000 TABLET ORAL 2 TIMES DAILY
Qty: 120 TABLET | Refills: 11 | Status: SHIPPED | OUTPATIENT
Start: 2024-10-23

## 2024-10-23 RX ORDER — LEVETIRACETAM 1000 MG/1
1000 TABLET ORAL 2 TIMES DAILY
Qty: 60 TABLET | Refills: 11 | Status: SHIPPED | OUTPATIENT
Start: 2024-10-23

## 2025-06-18 NOTE — CONSULTS
DATE OF SERVICE:  01/21/2021     NEUROSURGICAL CONSULTATION     HISTORY OF PRESENT ILLNESS:  The patient is a very pleasant 45-year-old female   with a history of migraine since the age of 16 and had a worsening migraine   over the past three days that was worse than her typical migraines ____ she   came into the emergency room.  She has a little bit of nausea, no vomiting, no   weakness, numbness or tingling.  No balance difficulty.     PAST MEDICAL HISTORY:  Migraine.     PAST SURGICAL HISTORY:  Cholecystectomy, ____ and left knee repair.     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  She does smoke.    No ETOH.  No illicits.     PHYSICAL EXAMINATION:    GENERAL:  A and O x3, GCS of 15.  HEENT:  Pupils equal, round, reactive to light.  Extraocular muscles intact.    Tongue midline.  Face symmetric.  NEUROLOGIC:  Motor is 5/5 strength in all muscle groups in the upper and lower   extremities.  Sensory grossly intact to light touch.  No drift.     LABORATORY DATA:  COVID is negative.  Pregnancy is negative.  Remainder of   labs are all pending.     DIAGNOSTIC DATA:  Imaging; CT scan of the brain, noncontrast shows a large   left-sided what appears to be an extraaxial calcified mass that is consistent   with meningioma.  There is significant mass effect.  This measures 4 cm in   both directions and abuts the midline.     PLAN:  Admit to the hospital.  Decadron 10 every 6 hours, Keppra 500 b.i.d.    MRI with and without contrast with Stealth protocol.  We will plan for a   craniotomy early next week.  She needs to be kept in-house.  No nonsteroidal   anti-inflammatories and all of her lab work that is needed for the operating   room is pending.  We will follow closely.  I have discussed with the ER doctor   and the hospitalist as well.        ______________________________  NURIS CROFT MD    CPD/SUB    DD:  01/21/2021 23:13  DT:  01/22/2021 00:40    Job#:  [id number]   [FreeTextEntry1] : patient may proceed with dental procedure from a cardiology standpoint. OK to stop Plavix 2 days prior and resume immediately post procedure once adequate hemostasis is achieved  VB

## (undated) DEVICE — SUCTION INSTRUMENT YANKAUER BULBOUS TIP W/O VENT (50EA/CA)

## (undated) DEVICE — BATTERY VARISPEED

## (undated) DEVICE — PEN SKIN MARKER W/RULER - (50EA/BX)

## (undated) DEVICE — SUTURE 4-0 NUROLON CR/8 TF - (12/BX) ETHICON

## (undated) DEVICE — GOWN SURGEONS X-LARGE - DISP. (30/CA)

## (undated) DEVICE — CORETEMP DRAPE FORM-FITTED EASY DROPANDGO DRAPE FOR USE ON THE CORETEMP FLUID MANAGEMENT 56IN X 56IN

## (undated) DEVICE — PATTIES SURG X-RAYCOTTONOID - 1/2 X 3 IN (200/CA)

## (undated) DEVICE — SUTURE GENERAL

## (undated) DEVICE — KIT SURGIFLO W/OUT THROMBIN - (6EA/CA)

## (undated) DEVICE — SURGIFOAM (SIZE 100) - (6EA/CA)

## (undated) DEVICE — RUBBERBAND STERILE #64 LATEX FREE (100/CA)

## (undated) DEVICE — TOWELS CLOTH SURGICAL - (4/PK 20PK/CA)

## (undated) DEVICE — SLEEVE, VASO, THIGH, MED

## (undated) DEVICE — GOWN SURGICAL XX-LARGE - (28EA/CA) SIRUS NON REINFORCED

## (undated) DEVICE — DISSECT TOOL MIDAS REX - (M-2)

## (undated) DEVICE — GLOVE, BIOGEL ECLIPSE, SZ 7.0, PF LTX (50/BX)

## (undated) DEVICE — PERFORATER DISP TIP DGR-0

## (undated) DEVICE — GLOVE BIOGEL SZ 6.5 SURGICAL PF LTX (50PR/BX 4BX/CA)

## (undated) DEVICE — COVER FOOT UNIVERSAL DISP. - (25EA/CA)

## (undated) DEVICE — TIP STRAIGHT SONOPET (5EA/PK)

## (undated) DEVICE — SUTURE 0 VICRYL PLUS CT-2 - 8 X 18 INCH (12/BX)

## (undated) DEVICE — TRAY CATHETER FOLEY URINE METER W/STATLOCK 350ML (10EA/CA)

## (undated) DEVICE — GLOVE BIOGEL SZ 8.5 SURGICAL PF LTX - (50PR/BX 4BX/CA)

## (undated) DEVICE — MIDAS LUBRICATOR DIFFUSER PACK (4EA/CA)

## (undated) DEVICE — COVER PROBE STERILE CONE (12EA/CA)

## (undated) DEVICE — DISSECT TOOL MIDAS F2/8TA23

## (undated) DEVICE — CATHETER URETHRAL FOLEY TEMPERATURE SENSE OD16 FR 3 ML (10EA/CA)

## (undated) DEVICE — KIT ANESTHESIA W/CIRCUIT & 3/LT BAG W/FILTER (20EA/CA)

## (undated) DEVICE — DRESSING NON-ADHERING 8 X 3 - (50/BX)

## (undated) DEVICE — HEMOSTAT SURG ABSORBABLE - 4 X 8 IN SURGICEL (24EA/CA)

## (undated) DEVICE — LACTATED RINGERS INJ. 500 ML - (24EA/CA)

## (undated) DEVICE — COVER LIGHT HANDLE FLEXIBLE - SOFT (2EA/PK 80PK/CA)

## (undated) DEVICE — CANISTER SUCTION 3000ML MECHANICAL FILTER AUTO SHUTOFF MEDI-VAC NONSTERILE LF DISP  (40EA/CA)

## (undated) DEVICE — CHLORAPREP 26 ML APPLICATOR - ORANGE TINT(25/CA)

## (undated) DEVICE — SET LEADWIRE 5 LEAD BEDSIDE DISPOSABLE ECG (1SET OF 5/EA)

## (undated) DEVICE — TRAY SURESTEP FOLEY TEMP SENSING 16FR (10EA/CA) ORDER  #18764 FOR TEMP FOLEY ONLY

## (undated) DEVICE — GOWN WARMING STANDARD FLEX - (30/CA)

## (undated) DEVICE — ELECTRODE 850 FOAM ADHESIVE - HYDROGEL RADIOTRNSPRNT (50/PK)

## (undated) DEVICE — PATTIES SURG X-RAYCOTTONOID - 1 X 3 IN (200/CA)

## (undated) DEVICE — GLOVE BIOGEL INDICATOR SZ 6.5 SURGICAL PF LTX - (50PR/BX 4BX/CA)

## (undated) DEVICE — DRESSING TRANSPARENT FILM TEGADERM 2.375 X 2.75"  (100EA/BX)"

## (undated) DEVICE — SPONGE GAUZE STER 4X4 8-PL - (2/PK 50PK/BX 12BX/CS)

## (undated) DEVICE — SCALP CLIP RANEY 20-1037 (10EA/PK 20PK/CA)

## (undated) DEVICE — SODIUM CHL. INJ. 0.9% 500ML (24EA/CA 50CA/PF)

## (undated) DEVICE — CLOSURE SKIN STRIP 1/2 X 4 IN - (STERI STRIP) (50/BX 4BX/CA)

## (undated) DEVICE — ELECTRODE DUAL RETURN W/ CORD - (50/PK)

## (undated) DEVICE — GLOVE BIOGEL INDICATOR SZ 7SURGICAL PF LTX - (50/BX 4BX/CA)

## (undated) DEVICE — TIP UNIVERSAL SPETZLER BARRACUDA (5EA/PK)

## (undated) DEVICE — SPHERE NAVIGATION STEALTH (5EA/TY 12TY/PK)

## (undated) DEVICE — LACTATED RINGERS INJ 1000 ML - (14EA/CA 60CA/PF)

## (undated) DEVICE — MASK ANESTHESIA ADULT  - (100/CA)

## (undated) DEVICE — GLOVE SZ 7 BIOGEL PI MICRO - PF LF (50PR/BX 4BX/CA)

## (undated) DEVICE — BLADE CLIPPER FITS 2501 CLIPPER (BLUE)  (20EA/CA)

## (undated) DEVICE — CORDS BIPOLAR COAGULATION - 12FT STERILE DISP. (10EA/BX)

## (undated) DEVICE — NEPTUNE 4 PORT MANIFOLD - (20/PK)

## (undated) DEVICE — SUTURE ETHILON 2-0 FSLX 30 (36PK/BX)"

## (undated) DEVICE — TUBING CLEARLINK DUO-VENT - C-FLO (48EA/CA)

## (undated) DEVICE — SPANDAGE SZ 6 ELASTIC NET - (25YD/CA)

## (undated) DEVICE — SODIUM CHL IRRIGATION 0.9% 1000ML (12EA/CA)

## (undated) DEVICE — STAPLER SKIN DISP - (6/BX 10BX/CA) VISISTAT

## (undated) DEVICE — DRAPE STRLE REG TOWEL 18X24 - (10/BX 4BX/CA)"

## (undated) DEVICE — BOVIE BLADE COATED &INSULATED - 25/PK

## (undated) DEVICE — PACK CRANI - (1EA/CA)

## (undated) DEVICE — SENSOR SPO2 NEO LNCS ADHESIVE (20/BX) SEE USER NOTES

## (undated) DEVICE — GLOVE BIOGEL PI INDICATOR SZ 7.0 SURGICAL PF LF - (50/BX 4BX/CA)

## (undated) DEVICE — PADDING CAST 4 IN STERILE - 4 X 4 YDS (24/CA)

## (undated) DEVICE — SET EXTENSION WITH 2 PORTS (48EA/CA) ***PART #2C8610 IS A SUBSTITUTE*****

## (undated) DEVICE — DRESSING XEROFORM 1X8 - (50/BX 4BX/CA)

## (undated) DEVICE — PROTECTOR ULNA NERVE - (36PR/CA)

## (undated) DEVICE — ELECTRODE LEEP 1.0X1.0 - (10EA/CA)

## (undated) DEVICE — KIT EVACUATER 3 SPRING PVC LF 1/8 DRAIN SIZE (10EA/CA)"

## (undated) DEVICE — FORCEPS ELECTROSURGICAL DISPOSABLE CODMAN 8IN 1.5MM

## (undated) DEVICE — SET TUBING SONOPET (5EA/PK)